# Patient Record
Sex: FEMALE | Race: WHITE | Employment: OTHER | URBAN - METROPOLITAN AREA
[De-identification: names, ages, dates, MRNs, and addresses within clinical notes are randomized per-mention and may not be internally consistent; named-entity substitution may affect disease eponyms.]

---

## 2017-02-15 ENCOUNTER — OFFICE VISIT (OUTPATIENT)
Dept: FAMILY MEDICINE CLINIC | Age: 72
End: 2017-02-15

## 2017-02-15 VITALS
RESPIRATION RATE: 16 BRPM | DIASTOLIC BLOOD PRESSURE: 66 MMHG | HEART RATE: 68 BPM | BODY MASS INDEX: 31.22 KG/M2 | SYSTOLIC BLOOD PRESSURE: 134 MMHG | HEIGHT: 68 IN | OXYGEN SATURATION: 95 % | TEMPERATURE: 96.7 F | WEIGHT: 206 LBS

## 2017-02-15 DIAGNOSIS — E78.2 MIXED HYPERLIPIDEMIA: ICD-10-CM

## 2017-02-15 DIAGNOSIS — E06.3 HASHIMOTO'S THYROIDITIS: Primary | ICD-10-CM

## 2017-02-15 RX ORDER — LEVOTHYROXINE SODIUM 50 UG/1
50 TABLET ORAL
Qty: 90 TAB | Refills: 3 | Status: SHIPPED | OUTPATIENT
Start: 2017-02-15 | End: 2018-01-30 | Stop reason: SDUPTHER

## 2017-02-15 NOTE — PROGRESS NOTES
1. Have you been to the ER, urgent care clinic since your last visit? Hospitalized since your last visit? No    2. Have you seen or consulted any other health care providers outside of the [address] [place] [address] since your last visit? Include any pap smears or colon screening.  No

## 2017-02-15 NOTE — MR AVS SNAPSHOT
Visit Information Date & Time Provider Department Dept. Phone Encounter #  
 2/15/2017  9:40 AM Ha Prado[address]  [id number] Follow-up Instructions Return in about 4 months (around 6/15/2017) for medicare wellness. Jerry Lira Upcoming Health Maintenance Date Due  
 GLAUCOMA SCREENING Q2Y 7/7/2010 INFLUENZA AGE 9 TO ADULT 8/1/2016 MEDICARE YEARLY EXAM 5/5/2017 BREAST CANCER SCRN MAMMOGRAM 11/5/2017 DTaP/Tdap/Td series (2 - Td) 6/23/2025 COLONOSCOPY 4/20/2026 Allergies as of 2/15/2017  Review Complete On: 2/15/2017 By: Ha Prado,  No Known Allergies Current Immunizations  Reviewed on 11/15/2016 Name Date Pneumococcal Conjugate (PCV-13) 11/15/2016 Pneumococcal Polysaccharide (PPSV-23) 10/15/2012 Tdap 6/23/2015 Not reviewed this visit You Were Diagnosed With   
  
 Codes Comments Hashimoto's thyroiditis    -  Primary ICD-10-CM: E06.3 ICD-9-CM: 245.2 Mixed hyperlipidemia     ICD-10-CM: E78.2 ICD-9-CM: 272.2 Vitals BP Pulse Temp Resp Height(growth percentile) Weight(growth percentile) 134/66 (BP 1 Location: Right arm, BP Patient Position: Sitting) 68 96.7 °F (35.9 °C) (Oral) 16 5' 8\" (1.727 m) 206 lb (93.4 kg) SpO2 BMI OB Status Smoking Status 95% 31.32 kg/m2 Postmenopausal Former Smoker BMI and BSA Data Body Mass Index Body Surface Area  
 31.32 kg/m 2 2.12 m 2 Preferred Pharmacy Pharmacy Name Phone [facility] [phone] Your Updated Medication List  
  
   
This list is accurate as of: 2/15/17 11:16 AM.  Always use your most recent med list.  
  
  
  
  
 aspirin delayed-release 81 mg tablet Take  by mouth daily. calcium-cholecalciferol (D3) tablet Commonly known as:  CALTRATE 600+D Take 1 tablet by mouth daily. FISH OIL 1,000 mg Cap Generic drug:  omega-3 fatty acids-vitamin e Take 1 capsule by mouth. ipratropium 17 mcg/actuation inhaler Commonly known as:  ATROVENT HFA Take 1 Puff by inhalation every four (4) hours as needed for Wheezing. levothyroxine 50 mcg tablet Commonly known as:  SYNTHROID Take 1 Tab by mouth Daily (before breakfast). Prescriptions Sent to Pharmacy Refills  
 levothyroxine (SYNTHROID) 50 mcg tablet 3 Sig: Take 1 Tab by mouth Daily (before breakfast). Class: Normal  
 [facility] [place]  #: [phone] Route: Oral  
  
Follow-up Instructions Return in about 4 months (around 6/15/2017) for medicare wellness. .  
  
  
Introducing [facility]! Dear Sonja Altman: Thank you for requesting a Treatspace account. Our records indicate that you have previously registered for a Treatspace account but its currently inactive. Please call our Treatspace support line at [phone]. Additional Information If you have questions, please visit the Frequently Asked Questions section of the Treatspace website at [email or web address] Remember, Treatspace is NOT to be used for urgent needs. For medical emergencies, dial 911. Now available from your iPhone and Android! Please provide this summary of care documentation to your next provider. Your primary care clinician is listed as Mamie Moreland. If you have any questions after today's visit, please call [phone].

## 2017-02-15 NOTE — PROGRESS NOTES
Subjective:     HPI:  Cleveland Bang is a 70 y.o. female who presents to the office for follow up on hypothyroidism and lab results. Hashimoto's Thyroiditis: Patient is taking medication as prescribed. No medication side effects noted. Patient reports her fatigue and dry skin have resolved since starting increased Levothyroxine dose. Labs Reviewed:   Lab Results   Component Value Date/Time    Sodium 139 11/15/2016 11:12 AM    Potassium 4.7 11/15/2016 11:12 AM    Chloride 100 11/15/2016 11:12 AM    CO2 29 11/15/2016 11:12 AM    Anion gap 10 11/15/2016 11:12 AM    Glucose 81 11/15/2016 11:12 AM    BUN 16 11/15/2016 11:12 AM    Creatinine 0.92 11/15/2016 11:12 AM    BUN/Creatinine ratio 17 11/15/2016 11:12 AM    GFR est AA >60 11/15/2016 11:12 AM    GFR est non-AA >60 11/15/2016 11:12 AM    Calcium 8.9 11/15/2016 11:12 AM    Bilirubin, total 0.4 11/15/2016 11:12 AM    AST (SGOT) 19 11/15/2016 11:12 AM    Alk. phosphatase 83 11/15/2016 11:12 AM    Protein, total 7.9 11/15/2016 11:12 AM    Albumin 3.7 11/15/2016 11:12 AM    Globulin 4.2 11/15/2016 11:12 AM    A-G Ratio 0.9 11/15/2016 11:12 AM    ALT (SGPT) 19 11/15/2016 11:12 AM     Lab Results   Component Value Date/Time    Cholesterol, total 223 11/15/2016 11:12 AM    HDL Cholesterol 64 11/15/2016 11:12 AM    LDL, calculated 145 11/15/2016 11:12 AM    VLDL, calculated 14 11/15/2016 11:12 AM    Triglyceride 70 11/15/2016 11:12 AM    CHOL/HDL Ratio 3.5 11/15/2016 11:12 AM     Lab Results   Component Value Date/Time    TSH 3.35 11/15/2016 11:12 AM         Current Outpatient Prescriptions   Medication Sig Dispense Refill    levothyroxine (SYNTHROID) 50 mcg tablet Take 1 Tab by mouth Daily (before breakfast). 90 Tab 3    ipratropium (ATROVENT HFA) 17 mcg/actuation inhaler Take 1 Puff by inhalation every four (4) hours as needed for Wheezing. 1 Inhaler 3    omega-3 fatty acids-vitamin e (FISH OIL) 1,000 mg cap Take 1 capsule by mouth.       calcium-cholecalciferol, D3, (CALTRATE 600+D) tablet Take 1 tablet by mouth daily.  aspirin delayed-release 81 mg tablet Take  by mouth daily. No Known Allergies    Past Medical History   Diagnosis Date    Acute bronchitis 12/28/2015    Advance directive discussed with patient 5/4/2016     Pt would like to appoint her son, Leo Zee as her medical decision maker in the event that she can no longer do so. Phone number is [phone]. Her secondary person is [facility] Phone number is [phone]. If her death is imminent and medical treatment will not help her recover, pt does want life prolonging measures. If her condition makes her unawar    Bilateral leg pain 10/22/2015    Elevated TSH 10/22/2015    Hashimoto's thyroiditis 12/28/2015    Hernia, abdominal     History of benign breast tumor     Nicotine dependence in remission 2/4/2016    Obesity, Class I, BMI 30-34.9 2/4/2016    Prediabetes 10/22/2015    Simple chronic bronchitis ([address] 5/4/2016    Skin lesion 2/4/2016    Varicose vein of leg         Past Surgical History   Procedure Laterality Date    Hx hernia repair       abdominal x2    Hx hernia repair  09/15/2016     ROBOTIC REPAIR OF RECURRENT INCARCERATED HERNIA WITH EXTENSIVE LYSIS OF ADHESIONS WITH PLACEMENT OF MESH    Hx cyst incision and drainage Right      35 y/o    Hx breast biopsy Right      Milk ducts removed       Family History   Problem Relation Age of Onset    Breast Cancer Mother 36   [de-identified] Lung Disease Mother     Elevated Lipids Mother     Stroke Father     Diabetes Paternal Grandfather        Social History     Social History    Marital status: SINGLE     Spouse name: N/A    Number of children: N/A    Years of education: N/A     Occupational History    Not on file.      Social History Main Topics    Smoking status: Former Smoker     Packs/day: 1.00     Years: 30.00     Quit date: 1/1/1992    Smokeless tobacco: Never Used    Alcohol use 1.2 oz/week     2 Glasses of wine per week      Comment: Occasionally     Drug use: No    Sexual activity: Not Currently     Other Topics Concern    Not on file     Social History Narrative       REVIEW OF SYSTEM:  Review of Systems   Constitutional: Negative for chills and fever. Eyes: Negative for blurred vision. Respiratory: Negative for shortness of breath. Cardiovascular: Negative for chest pain, palpitations and leg swelling. Gastrointestinal: Negative for constipation, diarrhea, nausea and vomiting. Musculoskeletal: Negative for joint pain. Neurological: Negative for headaches. Objective:     Visit Vitals    /66 (BP 1 Location: Right arm, BP Patient Position: Sitting)    Pulse 68    Temp 96.7 °F (35.9 °C) (Oral)    Resp 16    Ht 5' 8\" (1.727 m)    Wt 206 lb (93.4 kg)    SpO2 95%    BMI 31.32 kg/m2       PHYSICAL EXAM:  Physical Exam   Constitutional: She is oriented to person, place, and time and well-developed, well-nourished, and in no distress. HENT:   Right Ear: Tympanic membrane, external ear and ear canal normal.   Left Ear: Tympanic membrane, external ear and ear canal normal.   Nose: Nose normal.   Mouth/Throat: Oropharynx is clear and moist.   Eyes: Pupils are equal, round, and reactive to light. Neck: Normal range of motion. Neck supple. No thyromegaly present. Cardiovascular: Normal rate, regular rhythm, normal heart sounds and intact distal pulses. No murmur heard. Pulmonary/Chest: Effort normal and breath sounds normal. She has no wheezes. Neurological: She is alert and oriented to person, place, and time. Skin: Skin is warm and dry. Vitals reviewed. Assessment/Plan:       ICD-10-CM ICD-9-CM    1. Hashimoto's thyroiditis E06.3 245.2 levothyroxine (SYNTHROID) 50 mcg tablet   2.  Mixed hyperlipidemia E78.2 272.2       Patient would like to try to control hypercholesterolemia with lifestyle changes before starting medication. Patient counseled on diet and exercise changes to help reduce cholesterol. Hashimoto's Thyroiditis controlled on current Levothyroxine dose. Medication refilled. Patient given opportunity to ask questions. Questions answered. Patient understands plan of care. Follow-up Disposition:  Return in about 4 months (around 6/15/2017) for medicare wellness.  .        Written by Andreina Bustamante, as dictated by Tatyana Negrete DO.

## 2017-06-12 ENCOUNTER — HOSPITAL ENCOUNTER (OUTPATIENT)
Dept: LAB | Age: 72
Discharge: HOME | End: 2017-06-12

## 2017-06-12 ENCOUNTER — HOSPITAL ENCOUNTER (OUTPATIENT)
Dept: MAMMOGRAPHY | Age: 72
Discharge: HOME | End: 2017-06-12
Payer: MEDICARE

## 2017-06-12 DIAGNOSIS — Z12.31 VISIT FOR SCREENING MAMMOGRAM: ICD-10-CM

## 2017-06-12 PROCEDURE — 99001 SPECIMEN HANDLING PT-LAB: CPT | Performed by: FAMILY MEDICINE

## 2017-06-12 PROCEDURE — 77063 BREAST TOMOSYNTHESIS BI: CPT

## 2017-06-27 ENCOUNTER — OFFICE VISIT (OUTPATIENT)
Dept: FAMILY MEDICINE CLINIC | Age: 72
End: 2017-06-27

## 2017-06-27 VITALS
RESPIRATION RATE: 16 BRPM | WEIGHT: 207 LBS | SYSTOLIC BLOOD PRESSURE: 127 MMHG | TEMPERATURE: 96.7 F | HEART RATE: 68 BPM | OXYGEN SATURATION: 97 % | HEIGHT: 68 IN | DIASTOLIC BLOOD PRESSURE: 76 MMHG | BODY MASS INDEX: 31.37 KG/M2

## 2017-06-27 DIAGNOSIS — Z00.00 MEDICARE ANNUAL WELLNESS VISIT, SUBSEQUENT: Primary | ICD-10-CM

## 2017-06-27 DIAGNOSIS — Z13.5 SCREENING FOR GLAUCOMA: ICD-10-CM

## 2017-06-27 DIAGNOSIS — E78.5 HYPERLIPIDEMIA WITH TARGET LDL LESS THAN 70: ICD-10-CM

## 2017-06-27 NOTE — PROGRESS NOTES
1. Have you been to the ER, urgent care clinic since your last visit? Hospitalized since your last visit? No    2. Have you seen or consulted any other health care providers outside of the [address] [place] [address] since your last visit? Include any pap smears or colon screening.  No

## 2017-06-27 NOTE — MR AVS SNAPSHOT
Visit Information Date & Time Provider Department Dept. Phone Encounter #  
 6/27/2017  3:00 PM Marciano Alexis, [address] [place] [address]  [id number] Follow-up Instructions Return in about 3 months (around 9/27/2017). Upcoming Health Maintenance Date Due  
 GLAUCOMA SCREENING Q2Y 7/7/2010 MEDICARE YEARLY EXAM 5/5/2017 INFLUENZA AGE 9 TO ADULT 8/1/2017 BREAST CANCER SCRN MAMMOGRAM 6/12/2019 DTaP/Tdap/Td series (2 - Td) 6/23/2025 COLONOSCOPY 4/20/2026 Allergies as of 6/27/2017  Review Complete On: 6/27/2017 By: Marciano Alexis DO No Known Allergies Current Immunizations  Reviewed on 6/27/2017 Name Date Pneumococcal Conjugate (PCV-13) 11/15/2016 Pneumococcal Polysaccharide (PPSV-23) 10/15/2012 Tdap 6/23/2015 Reviewed by Marciano Alexis DO on 6/27/2017 at  3:36 PM  
You Were Diagnosed With   
  
 Codes Comments Medicare annual wellness visit, subsequent    -  Primary ICD-10-CM: Z00.00 ICD-9-CM: V70.0 Screening for glaucoma     ICD-10-CM: Z13.5 ICD-9-CM: V80.1 Hyperlipidemia with target LDL less than 70     ICD-10-CM: E78.5 ICD-9-CM: 272.4 Vitals BP Pulse Temp Resp Height(growth percentile) Weight(growth percentile) 127/76 (BP 1 Location: Right arm, BP Patient Position: Sitting) 68 96.7 °F (35.9 °C) (Oral) 16 5' 8\" (1.727 m) 207 lb (93.9 kg) SpO2 BMI OB Status Smoking Status 97% 31.47 kg/m2 Postmenopausal Former Smoker BMI and BSA Data Body Mass Index Body Surface Area  
 31.47 kg/m 2 2.12 m 2 Preferred Pharmacy Pharmacy Name Phone [phone] [phone] Your Updated Medication List  
  
   
This list is accurate as of: 6/27/17  4:06 PM.  Always use your most recent med list.  
  
  
  
  
 aspirin delayed-release 81 mg tablet Take  by mouth daily. calcium-cholecalciferol (D3) tablet Commonly known as:  CALTRATE 600+D Take 1 tablet by mouth daily. FISH OIL 1,000 mg Cap Generic drug:  omega-3 fatty acids-vitamin e Take 1 capsule by mouth. ipratropium 17 mcg/actuation inhaler Commonly known as:  ATROVENT HFA Take 1 Puff by inhalation every four (4) hours as needed for Wheezing. levothyroxine 50 mcg tablet Commonly known as:  SYNTHROID Take 1 Tab by mouth Daily (before breakfast). We Performed the Following REFERRAL TO OPHTHALMOLOGY [REF57 Custom] Comments:  
 Please screen patient for glaucoma. Follow-up Instructions Return in about 3 months (around 9/27/2017). Referral Information Referral ID Referred By Referred To  
  
 [id number] Sangita Patricia SCOTT Not Available Visits Status Start Date End Date 1 New Request 6/27/17 6/27/18 If your referral has a status of pending review or denied, additional information will be sent to support the outcome of this decision. Patient Instructions Medicare Part B Preventive Services Limitations Recommendation Scheduled Bone Mass Measurement 
(age 72 & older, biennial) Requires diagnosis related to osteoporosis or estrogen deficiency. Biennial benefit unless patient has history of long-term glucocorticoid tx or baseline is needed because initial test was by other method Cardiovascular Screening Blood Tests (every 5 years) Total cholesterol, HDL, Triglycerides Order as a panel if possible Colorectal Cancer Screening 
-Fecal occult blood test (annual) -Flexible sigmoidoscopy (5y) 
-Screening colonoscopy (10y) -Barium Enema Counseling to Prevent Tobacco Use (up to 8 sessions per year) - Counseling greater than 3 and up to 10 minutes - Counseling greater than 10 minutes Patients must be asymptomatic of tobacco-related conditions to receive as preventive service Diabetes Screening Tests (at least every 3 years, Medicare covers annually or at 6-month intervals for prediabetic patients) Fasting blood sugar (FBS) or glucose tolerance test (GTT) Patient must be diagnosed with one of the following: 
-Hypertension, Dyslipidemia, obesity, previous impaired FBS or GTT 
Or any two of the following: overweight, FH of diabetes, age ? 72, history of gestational diabetes, birth of baby weighing more than 9 pounds Diabetes Self-Management Training (DSMT) (no USPSTF recommendation) Requires referral by treating physician for patient with diabetes or renal disease. 10 hours of initial DSMT session of no less than 30 minutes each in a continuous 12-month period. 2 hours of follow-up DSMT in subsequent years. Glaucoma Screening (no USPSTF recommendation) Diabetes mellitus, family history, , age 48 or over,  American, age 72 or over Human Immunodeficiency Virus (HIV) Screening (annually for increased risk patients) HIV-1 and HIV-2 by EIA, AYESHA, rapid antibody test, or oral mucosa transudate Patient must be at increased risk for HIV infection per USPSTF guidelines or pregnant. Tests covered annually for patients at increased risk. Pregnant patients may receive up to 3 test during pregnancy. Medical Nutrition Therapy (MNT) (for diabetes or renal disease not recommended schedule) Requires referral by treating physician for patient with diabetes or renal disease. Can be provided in same year as diabetes self-management training (DSMT), and CMS recommends medical nutrition therapy take place after DSMT. Up to 3 hours for initial year and 2 hours in subsequent years. Shingles Vaccination A shingles vaccine is also recommended once in a lifetime after age 61 Seasonal Influenza Vaccination (annually) Pneumococcal Vaccination (once after 65) Hepatitis B Vaccinations (if medium/high risk) Medium/high risk factors:  End-stage renal disease, Hemophiliacs who received Factor VIII or IX concentrates, Clients of institutions for the mentally retarded, Persons who live in the same house as a HepB virus carrier, Homosexual men, Illicit injectable drug abusers. Screening Mammography (biennial age 54-69) Annually (age 36 or over) Screening Pap Tests and Pelvic Examination (up to age 79 and after 79 if unknown history or abnormal study last 10 years) Every 24 months except high risk Ultrasound Screening for Abdominal Aortic Aneurysm (AAA) (once) Patient must be referred through IPPE and not have had a screening for abdominal aortic aneurysm before under Medicare. Limited to patients who meet one of the following criteria: 
- Men who are 73-68 years old and have smoked more than 100 cigarettes in their lifetime. 
-Anyone with a FH of AAA 
-Anyone recommended for screening by USPSTF Introducing [facility]! Dear Cushing: Thank you for requesting a FashionGuide account. Our records indicate that you have previously registered for a FashionGuide account but its currently inactive. Please call our FashionGuide support line at [phone]. Additional Information If you have questions, please visit the Frequently Asked Questions section of the FashionGuide website at [email or web address] Remember, FashionGuide is NOT to be used for urgent needs. For medical emergencies, dial 911. Now available from your iPhone and Android! Please provide this summary of care documentation to your next provider. Your primary care clinician is listed as Giorgio Dsouza. If you have any questions after today's visit, please call [phone].

## 2017-06-27 NOTE — PROGRESS NOTES
This is a Subsequent Medicare Annual Wellness Visit providing Personalized Prevention Plan Services (PPPS) (Performed 12 months after initial AWV and PPPS )    I have reviewed the patient's medical history in detail and updated the computerized patient record. History     Past Medical History:   Diagnosis Date    Acute bronchitis 12/28/2015    Advance directive discussed with patient 5/4/2016    Pt would like to appoint her son, Suman Arreaga as her medical decision maker in the event that she can no longer do so. Phone number is [phone]. Her secondary person is her next door [employer]. Phone number is [phone]. If her death is imminent and medical treatment will not help her recover, pt does want life prolonging measures. If her condition makes her unawar    Bilateral leg pain 10/22/2015    Elevated TSH 10/22/2015    Hashimoto's thyroiditis 12/28/2015    Hernia, abdominal     History of benign breast tumor     Menopause     Nicotine dependence in remission 2/4/2016    Obesity, Class I, BMI 30-34.9 2/4/2016    Prediabetes 10/22/2015    Simple chronic bronchitis ([address] 5/4/2016    Skin lesion 2/4/2016    Varicose vein of leg       Past Surgical History:   Procedure Laterality Date    HX BREAST BIOPSY Right     Milk ducts removed    HX CYST INCISION AND DRAINAGE Right     37 y/o    HX HERNIA REPAIR      abdominal x2    HX HERNIA REPAIR  09/15/2016    ROBOTIC REPAIR OF RECURRENT INCARCERATED HERNIA WITH EXTENSIVE LYSIS OF ADHESIONS WITH PLACEMENT OF MESH     Current Outpatient Prescriptions   Medication Sig Dispense Refill    levothyroxine (SYNTHROID) 50 mcg tablet Take 1 Tab by mouth Daily (before breakfast). 90 Tab 3    ipratropium (ATROVENT HFA) 17 mcg/actuation inhaler Take 1 Puff by inhalation every four (4) hours as needed for Wheezing. 1 Inhaler 3    omega-3 fatty acids-vitamin e (FISH OIL) 1,000 mg cap Take 1 capsule by mouth.       calcium-cholecalciferol, D3, (CALTRATE 600+D) tablet Take 1 tablet by mouth daily.  aspirin delayed-release 81 mg tablet Take  by mouth daily. No Known Allergies  Family History   Problem Relation Age of Onset    Breast Cancer Mother 38     36s    Lung Disease Mother     Elevated Lipids Mother     Stroke Father     Diabetes Paternal Grandfather      Social History   Substance Use Topics    Smoking status: Former Smoker     Packs/day: 1.00     Years: 30.00     Quit date: 1/1/1992    Smokeless tobacco: Never Used    Alcohol use 1.2 oz/week     2 Glasses of wine per week      Comment: Occasionally      Patient Active Problem List   Diagnosis Code    Hyperlipidemia with target LDL less than 70 E78.5    Hashimoto's thyroiditis E06.3    Nicotine dependence in remission F17.201    Obesity, Class I, BMI 30-34.9 E66.9    Advance directive discussed with patient Z71.89       Depression Risk Factor Screening:     PHQ over the last two weeks 2/15/2017   Little interest or pleasure in doing things Not at all   Feeling down, depressed or hopeless Not at all   Total Score PHQ 2 0     Alcohol Risk Factor Screening: On any occasion during the past 3 months, have you had more than 3 drinks containing alcohol? No    Do you average more than 7 drinks per week? No        Functional Ability and Level of Safety:     Hearing Loss   moderate-to-severe    Activities of Daily Living   Self-care. Requires assistance with: no ADLs    Fall Risk   Fall Risk Assessment, last 12 mths 5/4/2016   Able to walk? Yes   Fall in past 12 months? No     Abuse Screen   Patient is not abused    Review of Systems   Review of Systems   Constitutional: Negative for chills and fever. Eyes: Negative for blurred vision. Respiratory: Negative for shortness of breath. Cardiovascular: Negative for chest pain, palpitations and leg swelling. Gastrointestinal: Negative for constipation, diarrhea, nausea and vomiting. Musculoskeletal: Negative for joint pain. Neurological: Negative for headaches. Physical Examination     Evaluation of Cognitive Function:  Mood/affect:  neutral  Appearance: casually dressed and within normal Limits  Family member/caregiver input: none    Physical Exam   Constitutional: She is oriented to person, place, and time and well-developed, well-nourished, and in no distress. HENT:   Right Ear: Tympanic membrane, external ear and ear canal normal.   Left Ear: Tympanic membrane, external ear and ear canal normal.   Nose: Nose normal.   Mouth/Throat: Oropharynx is clear and moist.   Eyes: Pupils are equal, round, and reactive to light. Neck: Normal range of motion. Neck supple. No thyromegaly present. Cardiovascular: Normal rate, regular rhythm, normal heart sounds and intact distal pulses. No murmur heard. Pulmonary/Chest: Effort normal and breath sounds normal. She has no wheezes. Neurological: She is alert and oriented to person, place, and time. GCS score is 15. Skin: Skin is warm and dry. Vitals reviewed. Patient Care Team:  Leo Frye DO as PCP - General (Family Practice)  Keven Davila MD (Gastroenterology)  Chalo Morse MD (Dermatology)  Darcy Naqvi NP (Nurse Practitioner)  Kadeem Goode MD as Consulting Provider (General Surgery)    Advice/Referrals/Counseling   Education and counseling provided (15+ minutes):  Are appropriate based on today's review and evaluation  End of Life Counseling (with patient's permission) - Advanced directives and code status reviewed with patient. Advanced Directives are on file. Pt is full code status.   Pneumococcal Vaccine - PCV-13 given 11/15/2016 and PPSV-23 given 10/15/2012  Tdap - given 6/23/2015  Influenza Vaccine - declined  Screening Mammography - done 6/12/2017, normal  Screening Pap and pelvic (covered once every 2 years) - last pap was 2013, normal  Colorectal cancer screening tests - last colonoscopy was 4/2016, normal findings  Bone mass measurement (DEXA) - done 12/11/2013, osteopenia. Screening for glaucoma - pt referred      Assessment/Plan       ICD-10-CM ICD-9-CM    1. Medicare annual wellness visit, subsequent Z00.00 V70.0    2. Screening for glaucoma Z13.5 V80.1 REFERRAL TO OPHTHALMOLOGY   3. Hyperlipidemia with target LDL less than 70 E78.5 272.4    . Patient given opportunity to ask questions. Questions answered. Patient understands plan of care. Follow-up Disposition:  Return in about 3 months (around 9/27/2017). Written by Gloria Hodge, as dictated by Valery Griffith DO.    I, Dr. Valery Griffith, confirm that all documentation is accurate.

## 2017-06-27 NOTE — PROGRESS NOTES
Subjective:     HPI:  Sudheer Carrasquillo is a 70 y.o. female who presents to the office to follow up on lab results. Labs Reviewed: Total Cholesterol and LDL decreased from lipid panel done 11/15/2016. Pt has made lifestyle changes to help reduce cholesterol. LDL is still elevated at 140. Lab Results   Component Value Date/Time    Sodium 140 06/12/2017 11:07 AM    Potassium 4.2 06/12/2017 11:07 AM    Chloride 100 06/12/2017 11:07 AM    CO2 24 06/12/2017 11:07 AM    Anion gap 10 11/15/2016 11:12 AM    Glucose 80 06/12/2017 11:07 AM    BUN 19 06/12/2017 11:07 AM    Creatinine 0.88 06/12/2017 11:07 AM    BUN/Creatinine ratio 22 06/12/2017 11:07 AM    GFR est AA 76 06/12/2017 11:07 AM    GFR est non-AA 66 06/12/2017 11:07 AM    Calcium 8.8 06/12/2017 11:07 AM    Bilirubin, total 0.4 06/12/2017 11:07 AM    AST (SGOT) 16 06/12/2017 11:07 AM    Alk. phosphatase 75 06/12/2017 11:07 AM    Protein, total 7.7 06/12/2017 11:07 AM    Albumin 3.9 06/12/2017 11:07 AM    Globulin 4.2 11/15/2016 11:12 AM    A-G Ratio 1.0 06/12/2017 11:07 AM    ALT (SGPT) 7 06/12/2017 11:07 AM     Lab Results   Component Value Date/Time    Cholesterol, total 217 06/12/2017 11:07 AM    HDL Cholesterol 56 06/12/2017 11:07 AM    LDL, calculated 140 06/12/2017 11:07 AM    VLDL, calculated 21 06/12/2017 11:07 AM    Triglyceride 104 06/12/2017 11:07 AM    CHOL/HDL Ratio 3.5 11/15/2016 11:12 AM     Lab Results   Component Value Date/Time    TSH 4.710 06/12/2017 11:07 AM       Current Outpatient Prescriptions   Medication Sig Dispense Refill    levothyroxine (SYNTHROID) 50 mcg tablet Take 1 Tab by mouth Daily (before breakfast). 90 Tab 3    ipratropium (ATROVENT HFA) 17 mcg/actuation inhaler Take 1 Puff by inhalation every four (4) hours as needed for Wheezing. 1 Inhaler 3    omega-3 fatty acids-vitamin e (FISH OIL) 1,000 mg cap Take 1 capsule by mouth.  calcium-cholecalciferol, D3, (CALTRATE 600+D) tablet Take 1 tablet by mouth daily.  aspirin delayed-release 81 mg tablet Take  by mouth daily. No Known Allergies    Past Medical History:   Diagnosis Date    Acute bronchitis 12/28/2015    Advance directive discussed with patient 5/4/2016    Pt would like to appoint her son, Nathalia Chowdary as her medical decision maker in the event that she can no longer do so. Phone number is [phone]. Her secondary person is her next door karen [employer]. Phone number is [phone]. If her death is imminent and medical treatment will not help her recover, pt does want life prolonging measures. If her condition makes her unawar    Bilateral leg pain 10/22/2015    Elevated TSH 10/22/2015    Hashimoto's thyroiditis 12/28/2015    Hernia, abdominal     History of benign breast tumor     Menopause     Nicotine dependence in remission 2/4/2016    Obesity, Class I, BMI 30-34.9 2/4/2016    Prediabetes 10/22/2015    Simple chronic bronchitis ([address] 5/4/2016    Skin lesion 2/4/2016    Varicose vein of leg         Past Surgical History:   Procedure Laterality Date    HX BREAST BIOPSY Right     Milk ducts removed    HX CYST INCISION AND DRAINAGE Right     37 y/o    HX HERNIA REPAIR      abdominal x2    HX HERNIA REPAIR  09/15/2016    ROBOTIC REPAIR OF RECURRENT INCARCERATED HERNIA WITH EXTENSIVE LYSIS OF ADHESIONS WITH PLACEMENT OF MESH       Family History   Problem Relation Age of Onset    Breast Cancer Mother 38     36s    Lung Disease Mother     Elevated Lipids Mother     Stroke Father     Diabetes Paternal Grandfather        Social History     Social History    Marital status: SINGLE     Spouse name: N/A    Number of children: N/A    Years of education: N/A     Occupational History    Not on file.      Social History Main Topics    Smoking status: Former Smoker     Packs/day: 1.00     Years: 30.00     Quit date: 1/1/1992    Smokeless tobacco: Never Used    Alcohol use 1.2 oz/week     2 Glasses of wine per week Comment: Occasionally     Drug use: No    Sexual activity: Not Currently     Other Topics Concern    Not on file     Social History Narrative       REVIEW OF SYSTEM:  Review of Systems   Constitutional: Negative for chills and fever. Eyes: Negative for blurred vision. Respiratory: Negative for shortness of breath. Cardiovascular: Negative for chest pain, palpitations and leg swelling. Gastrointestinal: Negative for constipation, diarrhea, nausea and vomiting. Musculoskeletal: Negative for joint pain. Neurological: Negative for headaches. Objective:     Visit Vitals    /76 (BP 1 Location: Right arm, BP Patient Position: Sitting)    Pulse 68    Temp 96.7 °F (35.9 °C) (Oral)    Resp 16    Ht 5' 8\" (1.727 m)    Wt 207 lb (93.9 kg)    SpO2 97%    BMI 31.47 kg/m2       PHYSICAL EXAM:  Physical Exam   Constitutional: She is oriented to person, place, and time and well-developed, well-nourished, and in no distress. HENT:   Right Ear: Tympanic membrane, external ear and ear canal normal.   Left Ear: Tympanic membrane, external ear and ear canal normal.   Nose: Nose normal.   Mouth/Throat: Oropharynx is clear and moist.   Eyes: Pupils are equal, round, and reactive to light. Neck: Normal range of motion. Neck supple. No thyromegaly present. Cardiovascular: Normal rate, regular rhythm, normal heart sounds and intact distal pulses. No murmur heard. Pulmonary/Chest: Effort normal and breath sounds normal. She has no wheezes. Neurological: She is alert and oriented to person, place, and time. GCS score is 15. Skin: Skin is warm and dry. Vitals reviewed. Assessment/Plan:       ICD-10-CM ICD-9-CM    1. Medicare annual wellness visit, subsequent Z00.00 V70.0    2. Screening for glaucoma Z13.5 V80.1 REFERRAL TO OPHTHALMOLOGY   3. Hyperlipidemia with target LDL less than 70 E78.5 272.4      Patient given opportunity to ask questions. Questions answered.   Patient understands plan of care. Follow-up Disposition:  Return in about 3 months (around 9/27/2017). Written by Kervin Langley, as dictated by DO. GRETCHEN Tracey, Dr. Иван Pillai, confirm that all documentation is accurate.

## 2017-06-27 NOTE — PATIENT INSTRUCTIONS
Medicare Part B Preventive Services Limitations Recommendation Scheduled   Bone Mass Measurement  (age 72 & older, biennial) Requires diagnosis related to osteoporosis or estrogen deficiency. Biennial benefit unless patient has history of long-term glucocorticoid tx or baseline is needed because initial test was by other method     Cardiovascular Screening Blood Tests (every 5 years)  Total cholesterol, HDL, Triglycerides Order as a panel if possible     Colorectal Cancer Screening  -Fecal occult blood test (annual)  -Flexible sigmoidoscopy (5y)  -Screening colonoscopy (10y)  -Barium Enema      Counseling to Prevent Tobacco Use (up to 8 sessions per year)  - Counseling greater than 3 and up to 10 minutes  - Counseling greater than 10 minutes Patients must be asymptomatic of tobacco-related conditions to receive as preventive service     Diabetes Screening Tests (at least every 3 years, Medicare covers annually or at 6-month intervals for prediabetic patients)    Fasting blood sugar (FBS) or glucose tolerance test (GTT) Patient must be diagnosed with one of the following:  -Hypertension, Dyslipidemia, obesity, previous impaired FBS or GTT  Or any two of the following: overweight, FH of diabetes, age ? 72, history of gestational diabetes, birth of baby weighing more than 9 pounds     Diabetes Self-Management Training (DSMT) (no USPSTF recommendation) Requires referral by treating physician for patient with diabetes or renal disease. 10 hours of initial DSMT session of no less than 30 minutes each in a continuous 12-month period. 2 hours of follow-up DSMT in subsequent years.      Glaucoma Screening (no USPSTF recommendation) Diabetes mellitus, family history, , age 48 or over,  American, age 72 or over     Human Immunodeficiency Virus (HIV) Screening (annually for increased risk patients)  HIV-1 and HIV-2 by EIA, AYESHA, rapid antibody test, or oral mucosa transudate Patient must be at increased risk for HIV infection per USPSTF guidelines or pregnant. Tests covered annually for patients at increased risk. Pregnant patients may receive up to 3 test during pregnancy. Medical Nutrition Therapy (MNT) (for diabetes or renal disease not recommended schedule) Requires referral by treating physician for patient with diabetes or renal disease. Can be provided in same year as diabetes self-management training (DSMT), and CMS recommends medical nutrition therapy take place after DSMT. Up to 3 hours for initial year and 2 hours in subsequent years. Shingles Vaccination A shingles vaccine is also recommended once in a lifetime after age 61     Seasonal Influenza Vaccination (annually)      Pneumococcal Vaccination (once after 72)      Hepatitis B Vaccinations (if medium/high risk) Medium/high risk factors:  End-stage renal disease,  Hemophiliacs who received Factor VIII or IX concentrates, Clients of institutions for the mentally retarded, Persons who live in the same house as a HepB virus carrier, Homosexual men, Illicit injectable drug abusers. Screening Mammography (biennial age 54-69) Annually (age 36 or over)     Screening Pap Tests and Pelvic Examination (up to age 79 and after 79 if unknown history or abnormal study last 10 years) Every 25 months except high risk     Ultrasound Screening for Abdominal Aortic Aneurysm (AAA) (once) Patient must be referred through [facility] and not have had a screening for abdominal aortic aneurysm before under Medicare.   Limited to patients who meet one of the following criteria:  - Men who are 73-68 years old and have smoked more than 100 cigarettes in their lifetime.  -Anyone with a FH of AAA  -Anyone recommended for screening by USPSTF

## 2017-08-08 DIAGNOSIS — J41.0 SIMPLE CHRONIC BRONCHITIS (HCC): ICD-10-CM

## 2017-08-10 DIAGNOSIS — J41.0 SIMPLE CHRONIC BRONCHITIS (HCC): ICD-10-CM

## 2017-08-11 DIAGNOSIS — J41.0 SIMPLE CHRONIC BRONCHITIS (HCC): ICD-10-CM

## 2017-08-13 RX ORDER — IPRATROPIUM BROMIDE 17 UG/1
AEROSOL, METERED RESPIRATORY (INHALATION)
Qty: 12.9 INHALER | Refills: 3 | OUTPATIENT
Start: 2017-08-13

## 2017-08-13 RX ORDER — IPRATROPIUM BROMIDE 17 UG/1
AEROSOL, METERED RESPIRATORY (INHALATION)
Qty: 12.9 INHALER | Refills: 3 | Status: SHIPPED | OUTPATIENT
Start: 2017-08-13 | End: 2018-07-02 | Stop reason: SDUPTHER

## 2017-09-27 ENCOUNTER — OFFICE VISIT (OUTPATIENT)
Dept: FAMILY MEDICINE CLINIC | Age: 72
End: 2017-09-27

## 2017-09-27 ENCOUNTER — HOSPITAL ENCOUNTER (OUTPATIENT)
Dept: LAB | Age: 72
Discharge: HOME | End: 2017-09-27
Payer: MEDICARE

## 2017-09-27 VITALS
OXYGEN SATURATION: 96 % | HEART RATE: 70 BPM | WEIGHT: 209 LBS | DIASTOLIC BLOOD PRESSURE: 71 MMHG | TEMPERATURE: 97.5 F | HEIGHT: 68 IN | SYSTOLIC BLOOD PRESSURE: 140 MMHG | BODY MASS INDEX: 31.67 KG/M2 | RESPIRATION RATE: 16 BRPM

## 2017-09-27 DIAGNOSIS — E06.3 HASHIMOTO'S THYROIDITIS: Primary | ICD-10-CM

## 2017-09-27 DIAGNOSIS — Z23 ENCOUNTER FOR IMMUNIZATION: ICD-10-CM

## 2017-09-27 LAB — TSH SERPL DL<=0.05 MIU/L-ACNC: 3.96 UIU/ML (ref 0.36–3.74)

## 2017-09-27 PROCEDURE — 36415 COLL VENOUS BLD VENIPUNCTURE: CPT | Performed by: FAMILY MEDICINE

## 2017-09-27 PROCEDURE — 84443 ASSAY THYROID STIM HORMONE: CPT | Performed by: FAMILY MEDICINE

## 2017-09-27 NOTE — MR AVS SNAPSHOT
Visit Information Date & Time Provider Department Dept. Phone Encounter #  
 9/27/2017  9:40 AM Taylor Stanley[address]  [id number] Follow-up Instructions Return in about 4 months (around 1/27/2018), or sooner if symptoms worsen or fail to improve. Upcoming Health Maintenance Date Due  
 GLAUCOMA SCREENING Q2Y 7/7/2010 INFLUENZA AGE 9 TO ADULT 8/1/2017 MEDICARE YEARLY EXAM 6/28/2018 BREAST CANCER SCRN MAMMOGRAM 6/12/2019 DTaP/Tdap/Td series (2 - Td) 6/23/2025 COLONOSCOPY 4/20/2026 Allergies as of 9/27/2017  Review Complete On: 9/27/2017 By: Taylor Stanley, DO No Known Allergies Current Immunizations  Reviewed on 6/27/2017 Name Date Influenza High Dose Vaccine PF  Incomplete Pneumococcal Conjugate (PCV-13) 11/15/2016 Pneumococcal Polysaccharide (PPSV-23) 10/15/2012 Tdap 6/23/2015 Not reviewed this visit You Were Diagnosed With   
  
 Codes Comments Hashimoto's thyroiditis    -  Primary ICD-10-CM: E06.3 ICD-9-CM: 245.2 Encounter for immunization     ICD-10-CM: H79 ICD-9-CM: V03.89 Vitals BP Pulse Temp Resp Height(growth percentile) Weight(growth percentile) 140/71 (BP 1 Location: Right arm, BP Patient Position: Sitting) 70 97.5 °F (36.4 °C) (Oral) 16 5' 8\" (1.727 m) 209 lb (94.8 kg) SpO2 BMI OB Status Smoking Status 96% 31.78 kg/m2 Postmenopausal Former Smoker BMI and BSA Data Body Mass Index Body Surface Area 31.78 kg/m 2 2.13 m 2 Preferred Pharmacy Pharmacy Name Phone [phone] [facility] [phone] Your Updated Medication List  
  
   
This list is accurate as of: 9/27/17 10:31 AM.  Always use your most recent med list.  
  
  
  
  
 aspirin delayed-release 81 mg tablet Take  by mouth daily. * ipratropium 17 mcg/actuation inhaler Commonly known as:  ATROVENT HFA  
 Take 1 Puff by inhalation every four (4) hours as needed for Wheezing. * ATROVENT HFA 17 mcg/actuation inhaler Generic drug:  ipratropium TAKE 1 PUFF BY INHALATION EVERY 4 HOURS AS NEEDED FOR WHEEZING  
  
 calcium-cholecalciferol (D3) tablet Commonly known as:  CALTRATE 600+D Take 1 tablet by mouth daily. FISH OIL 1,000 mg Cap Generic drug:  omega-3 fatty acids-vitamin e Take 1 capsule by mouth.  
  
 levothyroxine 50 mcg tablet Commonly known as:  SYNTHROID Take 1 Tab by mouth Daily (before breakfast). * Notice: This list has 2 medication(s) that are the same as other medications prescribed for you. Read the directions carefully, and ask your doctor or other care provider to review them with you. We Performed the Following ADMIN INFLUENZA VIRUS VAC [ \[facility]\""] INFLUENZA VIRUS VACCINE, HIGH DOSE SEASONAL, PRESERVATIVE FREE [15607 CPT(R)] Follow-up Instructions Return in about 4 months (around 1/27/2018), or sooner if symptoms worsen or fail to improve. To-Do List   
 09/27/2017 Lab:  TSH 3RD GENERATION [facility]! Dear Bienvenido Lebron: Thank you for requesting a [employer] account. Our records indicate that you have previously registered for a [employer] account but its currently inactive. Please call our [employer] support line at [phone]. Additional Information If you have questions, please visit the Frequently Asked Questions section of the [employer] website at [email or web address] Remember, [employer] is NOT to be used for urgent needs. For medical emergencies, dial 911. Now available from your iPhone and Android! Please provide this summary of care documentation to your next provider. Your primary care clinician is listed as Saritha Layton. If you have any questions after today's visit, please call [phone].

## 2017-09-27 NOTE — PROGRESS NOTES
1. Have you been to the ER, urgent care clinic since your last visit? Hospitalized since your last visit? No    2. Have you seen or consulted any other health care providers outside of the [address] [place] [address] since your last visit? Include any pap smears or colon screening.  No

## 2017-09-27 NOTE — PROGRESS NOTES
Subjective:     HPI:  Gurpreet Mata is a 67 y.o. female who presents to the office to follow up on Hashimoto's thyroiditis. Hashimoto's thyroiditis: Patient is taking Synthroid 50 mcg as instructed. No medication side effects noted. Elevated blood pressure    Pt's BP is 140/71 in the office today. Patient is currently not taking any medication for hypertension. Cardiovascular ROS: No TIA's, no chest pain on exertion, no dyspnea on exertion, no swelling of ankles. Of note,   Influenza vaccine given today. Current Outpatient Prescriptions   Medication Sig Dispense Refill    ATROVENT HFA 17 mcg/actuation inhaler  TAKE 1 PUFF BY INHALATION EVERY 4 HOURS AS NEEDED FOR WHEEZING 12.9 Inhaler 3    ipratropium (ATROVENT HFA) 17 mcg/actuation inhaler Take 1 Puff by inhalation every four (4) hours as needed for Wheezing. 1 Inhaler 3    levothyroxine (SYNTHROID) 50 mcg tablet Take 1 Tab by mouth Daily (before breakfast). 90 Tab 3    omega-3 fatty acids-vitamin e (FISH OIL) 1,000 mg cap Take 1 capsule by mouth.  calcium-cholecalciferol, D3, (CALTRATE 600+D) tablet Take 1 tablet by mouth daily.  aspirin delayed-release 81 mg tablet Take  by mouth daily. No Known Allergies    Past Medical History:   Diagnosis Date    Acute bronchitis 12/28/2015    Advance directive discussed with patient 5/4/2016    Pt would like to appoint her son, Jasvir Stinson as her medical decision maker in the event that she can no longer do so. Phone number is [phone]. Her secondary person is her [facility] door [facility] [employer]. Phone number is [phone]. If her death is imminent and medical treatment will not help her recover, pt does want life prolonging measures.   If her condition makes her unawar    Bilateral leg pain 10/22/2015    Elevated TSH 10/22/2015    Hashimoto's thyroiditis 12/28/2015    Hernia, abdominal     History of benign breast tumor     Menopause     Nicotine dependence in remission 2/4/2016    Obesity, Class I, BMI 30-34.9 2/4/2016    Prediabetes 10/22/2015    Simple chronic bronchitis ([address] 5/4/2016    Skin lesion 2/4/2016    Varicose vein of leg         Past Surgical History:   Procedure Laterality Date    HX BREAST BIOPSY Right     Milk ducts removed    HX CYST INCISION AND DRAINAGE Right     37 y/o    HX HERNIA REPAIR      abdominal x2    HX HERNIA REPAIR  09/15/2016    ROBOTIC REPAIR OF RECURRENT INCARCERATED HERNIA WITH EXTENSIVE LYSIS OF ADHESIONS WITH PLACEMENT OF MESH       Family History   Problem Relation Age of Onset    Breast Cancer Mother 38     36s    Lung Disease Mother     Elevated Lipids Mother     Stroke Father     Diabetes Paternal Grandfather        Social History     Social History    Marital status: SINGLE     Spouse name: N/A    Number of children: N/A    Years of education: N/A     Occupational History    Not on file. Social History Main Topics    Smoking status: Former Smoker     Packs/day: 1.00     Years: 30.00     Quit date: 1/1/1992    Smokeless tobacco: Never Used    Alcohol use 1.2 oz/week     2 Glasses of wine per week      Comment: Occasionally     Drug use: No    Sexual activity: Not Currently     Other Topics Concern    Not on file     Social History Narrative       REVIEW OF SYSTEM:  Review of Systems   Constitutional: Negative for chills and fever. Eyes: Negative for blurred vision. Respiratory: Negative for shortness of breath. Cardiovascular: Negative for chest pain, palpitations and leg swelling. Gastrointestinal: Negative for constipation, diarrhea, nausea and vomiting. Musculoskeletal: Negative for joint pain. Neurological: Negative for headaches.        Objective:     Visit Vitals    /71 (BP 1 Location: Right arm, BP Patient Position: Sitting)    Pulse 70    Temp 97.5 °F (36.4 °C) (Oral)    Resp 16    Ht 5' 8\" (1.727 m)    Wt 209 lb (94.8 kg)    SpO2 96%    BMI 31.78 kg/m2 PHYSICAL EXAM:  Physical Exam   Constitutional: She is oriented to person, place, and time and well-developed, well-nourished, and in no distress. HENT:   Right Ear: Tympanic membrane, external ear and ear canal normal.   Left Ear: Tympanic membrane, external ear and ear canal normal.   Nose: Nose normal.   Mouth/Throat: Oropharynx is clear and moist.   Eyes: Pupils are equal, round, and reactive to light. Neck: Normal range of motion. Neck supple. No thyromegaly present. Cardiovascular: Normal rate, regular rhythm, normal heart sounds and intact distal pulses. No murmur heard. Pulmonary/Chest: Effort normal and breath sounds normal. She has no wheezes. Neurological: She is alert and oriented to person, place, and time. GCS score is 15. Skin: Skin is warm and dry. Vitals reviewed. Assessment/Plan:       ICD-10-CM ICD-9-CM    1. Hashimoto's thyroiditis E06.3 245.2 TSH 3RD GENERATION      TSH 3RD GENERATION   2. Encounter for immunization Z23 V03.89 INFLUENZA VIRUS VACCINE, HIGH DOSE SEASONAL, PRESERVATIVE FREE      ADMIN INFLUENZA VIRUS VAC     Patient given opportunity to ask questions. Questions answered. BP in office today is 140/71. Patient advised to make diet changes and impertinent exercise to their schedule. Patient understands plan of care. Follow-up Disposition:  Return in about 4 months (around 1/27/2018), or sooner if symptoms worsen or fail to improve. Written by Demetrius Alexis, as dictated by DO. GRETCHEN Suarez, Dr. Segundo Kramer, confirm that all documentation is accurate.

## 2018-01-30 ENCOUNTER — OFFICE VISIT (OUTPATIENT)
Dept: FAMILY MEDICINE CLINIC | Age: 73
End: 2018-01-30

## 2018-01-30 VITALS
HEIGHT: 68 IN | BODY MASS INDEX: 32.61 KG/M2 | TEMPERATURE: 97.2 F | WEIGHT: 215.2 LBS | RESPIRATION RATE: 18 BRPM | HEART RATE: 71 BPM | SYSTOLIC BLOOD PRESSURE: 144 MMHG | OXYGEN SATURATION: 97 % | DIASTOLIC BLOOD PRESSURE: 76 MMHG

## 2018-01-30 DIAGNOSIS — E06.3 HASHIMOTO'S THYROIDITIS: Primary | ICD-10-CM

## 2018-01-30 RX ORDER — LEVOTHYROXINE SODIUM 50 UG/1
50 TABLET ORAL
Qty: 90 TAB | Refills: 3 | Status: SHIPPED | OUTPATIENT
Start: 2018-01-30 | End: 2019-01-02 | Stop reason: SDUPTHER

## 2018-01-30 NOTE — PROGRESS NOTES
1. Have you been to the ER, urgent care clinic since your last visit? Hospitalized since your last visit? No    2. Have you seen or consulted any other health care providers outside of the [address] [place] [address] since your last visit? Include any pap smears or colon screening.  No

## 2018-01-30 NOTE — PROGRESS NOTES
Subjective:     HPI:  Jovanny Malcolm is a 67 y.o. female who presents to the office to follow-up on Hashimoto's Thyroiditis. Hashimoto's thyroiditis: Patient is taking Synthroid 50 mcg as instructed. No medication side effects noted. Lab Results   Component Value Date/Time    TSH 3.96 09/27/2017 10:42 AM     Of note,   Pt followed-up with Opthalmology at [facility] in July, 2017 and had glaucoma screening completed. Pt states that her eye pressure was 3.9. Current Outpatient Prescriptions   Medication Sig Dispense Refill    levothyroxine (SYNTHROID) 50 mcg tablet Take 1 Tab by mouth Daily (before breakfast). 90 Tab 3    ATROVENT HFA 17 mcg/actuation inhaler  TAKE 1 PUFF BY INHALATION EVERY 4 HOURS AS NEEDED FOR WHEEZING 12.9 Inhaler 3    ipratropium (ATROVENT HFA) 17 mcg/actuation inhaler Take 1 Puff by inhalation every four (4) hours as needed for Wheezing. 1 Inhaler 3    omega-3 fatty acids-vitamin e (FISH OIL) 1,000 mg cap Take 1 capsule by mouth.  calcium-cholecalciferol, D3, (CALTRATE 600+D) tablet Take 1 tablet by mouth daily.  aspirin delayed-release 81 mg tablet Take  by mouth daily. No Known Allergies    Past Medical History:   Diagnosis Date    Acute bronchitis 12/28/2015    Advance directive discussed with patient 5/4/2016    Pt would like to appoint her son, Margarita Chapman as her medical decision maker in the event that she can no longer do so. Phone number is [phone]. Her secondary person is her next door [facility] [employer]. Phone number is [phone]. If her death is imminent and medical treatment will not help her recover, pt does want life prolonging measures.   If her condition makes her unawar    Bilateral leg pain 10/22/2015    Elevated TSH 10/22/2015    Hashimoto's thyroiditis 12/28/2015    Hernia, abdominal     History of benign breast tumor     Menopause     Nicotine dependence in remission 2/4/2016    Obesity, Class I, BMI 30-34.9 2/4/2016    Prediabetes 10/22/2015    Simple chronic bronchitis (HCC) 5/4/2016    Skin lesion 2/4/2016    Varicose vein of leg         Past Surgical History:   Procedure Laterality Date    HX BREAST BIOPSY Right     Milk ducts removed    HX CYST INCISION AND DRAINAGE Right     37 y/o    HX HERNIA REPAIR      abdominal x2    HX HERNIA REPAIR  09/15/2016    ROBOTIC REPAIR OF RECURRENT INCARCERATED HERNIA WITH EXTENSIVE LYSIS OF ADHESIONS WITH PLACEMENT OF MESH       Family History   Problem Relation Age of Onset    Breast Cancer Mother 38     36s    Lung Disease Mother     Elevated Lipids Mother     Stroke Father     Diabetes Paternal Grandfather        Social History     Social History    Marital status: SINGLE     Spouse name: N/A    Number of children: N/A    Years of education: N/A     Occupational History    Not on file. Social History Main Topics    Smoking status: Former Smoker     Packs/day: 1.00     Years: 30.00     Quit date: 1/1/1992    Smokeless tobacco: Never Used    Alcohol use 1.2 oz/week     2 Glasses of wine per week      Comment: Occasionally     Drug use: No    Sexual activity: Not Currently     Other Topics Concern    Not on file     Social History Narrative       REVIEW OF SYSTEM:  Review of Systems   Constitutional: Negative for chills and fever. Eyes: Negative for blurred vision. Respiratory: Negative for shortness of breath. Cardiovascular: Negative for chest pain, palpitations and leg swelling. Gastrointestinal: Negative for constipation, diarrhea, nausea and vomiting. Musculoskeletal: Negative for joint pain. Neurological: Negative for headaches.        Objective:     Visit Vitals    /76 (BP 1 Location: Right arm, BP Patient Position: Sitting)    Pulse 71    Temp 97.2 °F (36.2 °C) (Oral)    Resp 18    Ht 5' 8\" (1.727 m)    Wt 215 lb 3.2 oz (97.6 kg)    SpO2 97%    BMI 32.72 kg/m2       PHYSICAL EXAM:  Physical Exam Constitutional: She is oriented to person, place, and time and well-developed, well-nourished, and in no distress. HENT:   Right Ear: Tympanic membrane, external ear and ear canal normal.   Left Ear: Tympanic membrane, external ear and ear canal normal.   Nose: Nose normal.   Mouth/Throat: Oropharynx is clear and moist.   Eyes: Pupils are equal, round, and reactive to light. Neck: Normal range of motion. Neck supple. No thyromegaly present. Cardiovascular: Normal rate, regular rhythm, normal heart sounds and intact distal pulses. No murmur heard. Pulmonary/Chest: Effort normal and breath sounds normal. She has no wheezes. Neurological: She is alert and oriented to person, place, and time. GCS score is 15. Skin: Skin is warm and dry. Vitals reviewed. Assessment/Plan:       ICD-10-CM ICD-9-CM    1. Hashimoto's thyroiditis E06.3 245.2 levothyroxine (SYNTHROID) 50 mcg tablet      TSH 3RD GENERATION      TSH 3RD GENERATION     Patient given opportunity to ask questions. Questions answered. Patient understands plan of care. Follow-up Disposition:  Return in about 5 months (around 6/30/2018). Written by Yonathan Palumbo, as dictated by DO. GRETCHEN Solis, Dr. Rosario Jimenez, confirm that all documentation is accurate.

## 2018-01-30 NOTE — MR AVS SNAPSHOT
Rodney Hernandez 
 
 
 [id number] [facility] [address] [id number] 
[phone] Patient: Yelena Page MRN: [id number] ICP:[id number] Visit Information Date & Time Provider Department Dept. Phone Encounter #  
 1/30/2018 10:20 AM Reggie Lewis[address]  [id number] Follow-up Instructions Return in about 5 months (around 6/30/2018). Upcoming Health Maintenance Date Due  
 GLAUCOMA SCREENING Q2Y 7/7/2010 MEDICARE YEARLY EXAM 6/28/2018 BREAST CANCER SCRN MAMMOGRAM 6/12/2019 DTaP/Tdap/Td series (2 - Td) 6/23/2025 COLONOSCOPY 4/20/2026 Allergies as of 1/30/2018  Review Complete On: 1/30/2018 By: Reggie Lewis, DO No Known Allergies Current Immunizations  Reviewed on 6/27/2017 Name Date Influenza High Dose Vaccine PF 9/27/2017 Pneumococcal Conjugate (PCV-13) 11/15/2016 Pneumococcal Polysaccharide (PPSV-23) 10/15/2012 Tdap 6/23/2015 Not reviewed this visit You Were Diagnosed With   
  
 Codes Comments Hashimoto's thyroiditis    -  Primary ICD-10-CM: E06.3 ICD-9-CM: 784. 2 Vitals BP Pulse Temp Resp Height(growth percentile) Weight(growth percentile) 144/76 (BP 1 Location: Right arm, BP Patient Position: Sitting) 71 97.2 °F (36.2 °C) (Oral) 18 5' 8\" (1.727 m) 215 lb 3.2 oz (97.6 kg) SpO2 BMI OB Status Smoking Status 97% 32.72 kg/m2 Postmenopausal Former Smoker BMI and BSA Data Body Mass Index Body Surface Area 32.72 kg/m 2 2.16 m 2 Preferred Pharmacy Pharmacy Name Phone [id number] [facility] [phone] Your Updated Medication List  
  
   
This list is accurate as of: 1/30/18 11:42 AM.  Always use your most recent med list.  
  
  
  
  
 aspirin delayed-release 81 mg tablet Take  by mouth daily. * ipratropium 17 mcg/actuation inhaler Commonly known as:  ATROVENT HFA Take 1 Puff by inhalation every four (4) hours as needed for Wheezing. * ATROVENT HFA 17 mcg/actuation inhaler Generic drug:  ipratropium TAKE 1 PUFF BY INHALATION EVERY 4 HOURS AS NEEDED FOR WHEEZING  
  
 calcium-cholecalciferol (D3) tablet Commonly known as:  CALTRATE 600+D Take 1 tablet by mouth daily. FISH OIL 1,000 mg Cap Generic drug:  omega-3 fatty acids-vitamin e Take 1 capsule by mouth.  
  
 levothyroxine 50 mcg tablet Commonly known as:  SYNTHROID Take 1 Tab by mouth Daily (before breakfast). * Notice: This list has 2 medication(s) that are the same as other medications prescribed for you. Read the directions carefully, and ask your doctor or other care provider to review them with you. Prescriptions Sent to Pharmacy Refills  
 levothyroxine (SYNTHROID) 50 mcg tablet 3 Sig: Take 1 Tab by mouth Daily (before breakfast). Class: Normal  
 [facility] [phone] Route: Oral  
  
Follow-up Instructions Return in about 5 months (around 6/30/2018). To-Do List   
 01/30/2018 Lab:  TSH 3RD GENERATION [facility]! Dear Mirella Stewart: Thank you for requesting a Enubila account. Our records indicate that you already have an active Enubila account. You can access your account anytime at [email or web address] Did you know that you can access your hospital and ER discharge instructions at any time in Enubila? You can also review all of your test results from your hospital stay or ER visit. Additional Information If you have questions, please visit the Frequently Asked Questions section of the Enubila website at [email or web address] Remember, Enubila is NOT to be used for urgent needs. For medical emergencies, dial 911. Now available from your iPhone and Android! Please provide this summary of care documentation to your next provider. Your primary care clinician is listed as Silvia Asher. If you have any questions after today's visit, please call [phone].

## 2018-01-31 LAB — TSH SERPL DL<=0.005 MIU/L-ACNC: 4.9 UIU/ML (ref 0.45–4.5)

## 2018-03-07 ENCOUNTER — APPOINTMENT (OUTPATIENT)
Dept: GENERAL RADIOLOGY | Age: 73
DRG: 191 | End: 2018-03-07
Attending: PHYSICIAN ASSISTANT
Payer: MEDICARE

## 2018-03-07 ENCOUNTER — APPOINTMENT (OUTPATIENT)
Dept: CT IMAGING | Age: 73
DRG: 191 | End: 2018-03-07
Attending: EMERGENCY MEDICINE
Payer: MEDICARE

## 2018-03-07 ENCOUNTER — HOSPITAL ENCOUNTER (INPATIENT)
Age: 73
LOS: 2 days | Discharge: HOME HEALTH | DRG: 191 | End: 2018-03-09
Attending: EMERGENCY MEDICINE | Admitting: HOSPITALIST
Payer: MEDICARE

## 2018-03-07 ENCOUNTER — OFFICE VISIT (OUTPATIENT)
Dept: INTERNAL MEDICINE CLINIC | Age: 73
End: 2018-03-07

## 2018-03-07 VITALS
RESPIRATION RATE: 22 BRPM | TEMPERATURE: 97.3 F | DIASTOLIC BLOOD PRESSURE: 75 MMHG | HEIGHT: 68 IN | HEART RATE: 83 BPM | WEIGHT: 218 LBS | BODY MASS INDEX: 33.04 KG/M2 | OXYGEN SATURATION: 87 % | SYSTOLIC BLOOD PRESSURE: 148 MMHG

## 2018-03-07 DIAGNOSIS — R06.02 SHORTNESS OF BREATH: ICD-10-CM

## 2018-03-07 DIAGNOSIS — J98.01 BRONCHOSPASM: ICD-10-CM

## 2018-03-07 DIAGNOSIS — R09.02 HYPOXEMIA: ICD-10-CM

## 2018-03-07 DIAGNOSIS — R68.89 FLU-LIKE SYMPTOMS: Primary | ICD-10-CM

## 2018-03-07 DIAGNOSIS — J11.1 INFLUENZA: ICD-10-CM

## 2018-03-07 DIAGNOSIS — R05.8 PRODUCTIVE COUGH: ICD-10-CM

## 2018-03-07 PROBLEM — J44.1 COPD WITH EXACERBATION (HCC): Status: ACTIVE | Noted: 2018-03-07

## 2018-03-07 LAB
ALBUMIN SERPL-MCNC: 3.9 G/DL (ref 3.4–5)
ALBUMIN/GLOB SERPL: 0.8 {RATIO} (ref 0.8–1.7)
ALP SERPL-CCNC: 98 U/L (ref 45–117)
ALT SERPL-CCNC: 17 U/L (ref 13–56)
ANION GAP SERPL CALC-SCNC: 6 MMOL/L (ref 3–18)
APPEARANCE UR: CLEAR
AST SERPL-CCNC: 18 U/L (ref 15–37)
ATRIAL RATE: 80 BPM
BASOPHILS # BLD: 0 K/UL (ref 0–0.06)
BASOPHILS NFR BLD: 0 % (ref 0–2)
BILIRUB SERPL-MCNC: 0.3 MG/DL (ref 0.2–1)
BILIRUB UR QL: NEGATIVE
BNP SERPL-MCNC: 492 PG/ML (ref 0–900)
BUN SERPL-MCNC: 16 MG/DL (ref 7–18)
BUN/CREAT SERPL: 18 (ref 12–20)
CALCIUM SERPL-MCNC: 8.4 MG/DL (ref 8.5–10.1)
CALCULATED P AXIS, ECG09: 34 DEGREES
CALCULATED R AXIS, ECG10: 86 DEGREES
CALCULATED T AXIS, ECG11: 30 DEGREES
CHLORIDE SERPL-SCNC: 103 MMOL/L (ref 100–108)
CK MB CFR SERPL CALC: 1.4 % (ref 0–4)
CK MB SERPL-MCNC: 1 NG/ML (ref 5–25)
CK SERPL-CCNC: 74 U/L (ref 26–192)
CO2 SERPL-SCNC: 31 MMOL/L (ref 21–32)
COLOR UR: YELLOW
CREAT SERPL-MCNC: 0.9 MG/DL (ref 0.6–1.3)
DIAGNOSIS, 93000: NORMAL
DIFFERENTIAL METHOD BLD: ABNORMAL
EOSINOPHIL # BLD: 0 K/UL (ref 0–0.4)
EOSINOPHIL NFR BLD: 0 % (ref 0–5)
ERYTHROCYTE [DISTWIDTH] IN BLOOD BY AUTOMATED COUNT: 13.1 % (ref 11.6–14.5)
GLOBULIN SER CALC-MCNC: 4.6 G/DL (ref 2–4)
GLUCOSE SERPL-MCNC: 130 MG/DL (ref 74–99)
GLUCOSE UR STRIP.AUTO-MCNC: NEGATIVE MG/DL
HCT VFR BLD AUTO: 42.6 % (ref 35–45)
HGB BLD-MCNC: 14.1 G/DL (ref 12–16)
HGB UR QL STRIP: NEGATIVE
KETONES UR QL STRIP.AUTO: ABNORMAL MG/DL
LACTATE BLD-SCNC: 2.6 MMOL/L (ref 0.4–2)
LACTATE BLD-SCNC: 3 MMOL/L (ref 0.4–2)
LACTATE SERPL-SCNC: 3.4 MMOL/L (ref 0.4–2)
LEUKOCYTE ESTERASE UR QL STRIP.AUTO: NEGATIVE
LYMPHOCYTES # BLD: 0.4 K/UL (ref 0.9–3.6)
LYMPHOCYTES NFR BLD: 6 % (ref 21–52)
MCH RBC QN AUTO: 31.7 PG (ref 24–34)
MCHC RBC AUTO-ENTMCNC: 33.1 G/DL (ref 31–37)
MCV RBC AUTO: 95.7 FL (ref 74–97)
MONOCYTES # BLD: 0.1 K/UL (ref 0.05–1.2)
MONOCYTES NFR BLD: 2 % (ref 3–10)
NEUTS SEG # BLD: 6.2 K/UL (ref 1.8–8)
NEUTS SEG NFR BLD: 92 % (ref 40–73)
NITRITE UR QL STRIP.AUTO: NEGATIVE
P-R INTERVAL, ECG05: 162 MS
PH UR STRIP: 5 [PH] (ref 5–8)
PLATELET # BLD AUTO: 184 K/UL (ref 135–420)
PMV BLD AUTO: 10.2 FL (ref 9.2–11.8)
POTASSIUM SERPL-SCNC: 3.9 MMOL/L (ref 3.5–5.5)
PROT SERPL-MCNC: 8.5 G/DL (ref 6.4–8.2)
PROT UR STRIP-MCNC: NEGATIVE MG/DL
Q-T INTERVAL, ECG07: 388 MS
QRS DURATION, ECG06: 90 MS
QTC CALCULATION (BEZET), ECG08: 447 MS
QUICKVUE INFLUENZA TEST: POSITIVE
RBC # BLD AUTO: 4.45 M/UL (ref 4.2–5.3)
SODIUM SERPL-SCNC: 140 MMOL/L (ref 136–145)
SP GR UR REFRACTOMETRY: >1.03 (ref 1–1.03)
TROPONIN I SERPL-MCNC: <0.02 NG/ML (ref 0–0.04)
UROBILINOGEN UR QL STRIP.AUTO: 0.2 EU/DL (ref 0.2–1)
VALID INTERNAL CONTROL?: YES
VENTRICULAR RATE, ECG03: 80 BPM
WBC # BLD AUTO: 6.7 K/UL (ref 4.6–13.2)

## 2018-03-07 PROCEDURE — 74011000250 HC RX REV CODE- 250: Performed by: EMERGENCY MEDICINE

## 2018-03-07 PROCEDURE — 71275 CT ANGIOGRAPHY CHEST: CPT

## 2018-03-07 PROCEDURE — 74011250636 HC RX REV CODE- 250/636: Performed by: NURSE PRACTITIONER

## 2018-03-07 PROCEDURE — 93005 ELECTROCARDIOGRAM TRACING: CPT

## 2018-03-07 PROCEDURE — 96360 HYDRATION IV INFUSION INIT: CPT

## 2018-03-07 PROCEDURE — 74011250637 HC RX REV CODE- 250/637: Performed by: NURSE PRACTITIONER

## 2018-03-07 PROCEDURE — 74011000250 HC RX REV CODE- 250: Performed by: PHYSICIAN ASSISTANT

## 2018-03-07 PROCEDURE — 83880 ASSAY OF NATRIURETIC PEPTIDE: CPT | Performed by: EMERGENCY MEDICINE

## 2018-03-07 PROCEDURE — 96375 TX/PRO/DX INJ NEW DRUG ADDON: CPT

## 2018-03-07 PROCEDURE — 74011636320 HC RX REV CODE- 636/320: Performed by: EMERGENCY MEDICINE

## 2018-03-07 PROCEDURE — 94760 N-INVAS EAR/PLS OXIMETRY 1: CPT

## 2018-03-07 PROCEDURE — 85025 COMPLETE CBC W/AUTO DIFF WBC: CPT | Performed by: PHYSICIAN ASSISTANT

## 2018-03-07 PROCEDURE — 82550 ASSAY OF CK (CPK): CPT | Performed by: EMERGENCY MEDICINE

## 2018-03-07 PROCEDURE — 87040 BLOOD CULTURE FOR BACTERIA: CPT | Performed by: PHYSICIAN ASSISTANT

## 2018-03-07 PROCEDURE — 94761 N-INVAS EAR/PLS OXIMETRY MLT: CPT

## 2018-03-07 PROCEDURE — 74011000250 HC RX REV CODE- 250: Performed by: NURSE PRACTITIONER

## 2018-03-07 PROCEDURE — 99284 EMERGENCY DEPT VISIT MOD MDM: CPT

## 2018-03-07 PROCEDURE — 74011000258 HC RX REV CODE- 258: Performed by: EMERGENCY MEDICINE

## 2018-03-07 PROCEDURE — 96361 HYDRATE IV INFUSION ADD-ON: CPT

## 2018-03-07 PROCEDURE — 83605 ASSAY OF LACTIC ACID: CPT

## 2018-03-07 PROCEDURE — 77030032490 HC SLV COMPR SCD KNE COVD -B

## 2018-03-07 PROCEDURE — 74011250636 HC RX REV CODE- 250/636: Performed by: EMERGENCY MEDICINE

## 2018-03-07 PROCEDURE — 83605 ASSAY OF LACTIC ACID: CPT | Performed by: PHYSICIAN ASSISTANT

## 2018-03-07 PROCEDURE — 94640 AIRWAY INHALATION TREATMENT: CPT

## 2018-03-07 PROCEDURE — 96365 THER/PROPH/DIAG IV INF INIT: CPT

## 2018-03-07 PROCEDURE — 77030020263 HC SOL INJ SOD CL0.9% LFCR 1000ML

## 2018-03-07 PROCEDURE — 71045 X-RAY EXAM CHEST 1 VIEW: CPT

## 2018-03-07 PROCEDURE — 65660000000 HC RM CCU STEPDOWN

## 2018-03-07 PROCEDURE — 81003 URINALYSIS AUTO W/O SCOPE: CPT | Performed by: PHYSICIAN ASSISTANT

## 2018-03-07 PROCEDURE — 80053 COMPREHEN METABOLIC PANEL: CPT | Performed by: PHYSICIAN ASSISTANT

## 2018-03-07 PROCEDURE — 74011250636 HC RX REV CODE- 250/636: Performed by: PHYSICIAN ASSISTANT

## 2018-03-07 RX ORDER — ACETAMINOPHEN 325 MG/1
650 TABLET ORAL
Status: DISCONTINUED | OUTPATIENT
Start: 2018-03-07 | End: 2018-03-09 | Stop reason: HOSPADM

## 2018-03-07 RX ORDER — OSELTAMIVIR PHOSPHATE 75 MG/1
75 CAPSULE ORAL 2 TIMES DAILY
Status: DISCONTINUED | OUTPATIENT
Start: 2018-03-07 | End: 2018-03-07

## 2018-03-07 RX ORDER — LEVOTHYROXINE SODIUM 50 UG/1
50 TABLET ORAL
Status: DISCONTINUED | OUTPATIENT
Start: 2018-03-08 | End: 2018-03-09 | Stop reason: HOSPADM

## 2018-03-07 RX ORDER — SODIUM CHLORIDE 9 MG/ML
100 INJECTION, SOLUTION INTRAVENOUS ONCE
Status: COMPLETED | OUTPATIENT
Start: 2018-03-07 | End: 2018-03-07

## 2018-03-07 RX ORDER — IPRATROPIUM BROMIDE AND ALBUTEROL SULFATE 2.5; .5 MG/3ML; MG/3ML
3 SOLUTION RESPIRATORY (INHALATION)
Qty: 1 NEBULE | Refills: 0 | Status: SHIPPED | COMMUNITY
Start: 2018-03-07 | End: 2018-03-09

## 2018-03-07 RX ORDER — HYDROCODONE BITARTRATE AND ACETAMINOPHEN 5; 325 MG/1; MG/1
1 TABLET ORAL
Status: DISCONTINUED | OUTPATIENT
Start: 2018-03-07 | End: 2018-03-09 | Stop reason: HOSPADM

## 2018-03-07 RX ORDER — SODIUM CHLORIDE 9 MG/ML
50 INJECTION, SOLUTION INTRAVENOUS CONTINUOUS
Status: DISCONTINUED | OUTPATIENT
Start: 2018-03-07 | End: 2018-03-08

## 2018-03-07 RX ORDER — ASPIRIN 81 MG/1
81 TABLET ORAL DAILY
Status: DISCONTINUED | OUTPATIENT
Start: 2018-03-08 | End: 2018-03-09 | Stop reason: HOSPADM

## 2018-03-07 RX ORDER — DEXAMETHASONE SODIUM PHOSPHATE 4 MG/ML
10 INJECTION, SOLUTION INTRA-ARTICULAR; INTRALESIONAL; INTRAMUSCULAR; INTRAVENOUS; SOFT TISSUE
Status: COMPLETED | OUTPATIENT
Start: 2018-03-07 | End: 2018-03-07

## 2018-03-07 RX ORDER — MAGNESIUM SULFATE HEPTAHYDRATE 40 MG/ML
2 INJECTION, SOLUTION INTRAVENOUS
Status: COMPLETED | OUTPATIENT
Start: 2018-03-07 | End: 2018-03-07

## 2018-03-07 RX ORDER — HEPARIN SODIUM 5000 [USP'U]/ML
5000 INJECTION, SOLUTION INTRAVENOUS; SUBCUTANEOUS EVERY 8 HOURS
Status: DISCONTINUED | OUTPATIENT
Start: 2018-03-07 | End: 2018-03-09 | Stop reason: HOSPADM

## 2018-03-07 RX ORDER — OSELTAMIVIR PHOSPHATE 75 MG/1
75 CAPSULE ORAL 2 TIMES DAILY
Qty: 10 CAP | Refills: 0 | Status: SHIPPED | OUTPATIENT
Start: 2018-03-07 | End: 2018-03-12

## 2018-03-07 RX ORDER — IPRATROPIUM BROMIDE AND ALBUTEROL SULFATE 2.5; .5 MG/3ML; MG/3ML
3 SOLUTION RESPIRATORY (INHALATION)
Qty: 2 NEBULE | Refills: 0 | Status: SHIPPED | COMMUNITY
Start: 2018-03-07 | End: 2018-03-09

## 2018-03-07 RX ORDER — IPRATROPIUM BROMIDE AND ALBUTEROL SULFATE 2.5; .5 MG/3ML; MG/3ML
3 SOLUTION RESPIRATORY (INHALATION)
Status: DISCONTINUED | OUTPATIENT
Start: 2018-03-07 | End: 2018-03-09 | Stop reason: HOSPADM

## 2018-03-07 RX ORDER — IPRATROPIUM BROMIDE AND ALBUTEROL SULFATE 2.5; .5 MG/3ML; MG/3ML
3 SOLUTION RESPIRATORY (INHALATION)
Status: COMPLETED | OUTPATIENT
Start: 2018-03-07 | End: 2018-03-07

## 2018-03-07 RX ORDER — PREDNISONE 10 MG/1
TABLET ORAL
Qty: 21 TAB | Refills: 0 | Status: SHIPPED | OUTPATIENT
Start: 2018-03-07 | End: 2018-03-15 | Stop reason: ALTCHOICE

## 2018-03-07 RX ORDER — SODIUM CHLORIDE 0.9 % (FLUSH) 0.9 %
5-10 SYRINGE (ML) INJECTION AS NEEDED
Status: DISCONTINUED | OUTPATIENT
Start: 2018-03-07 | End: 2018-03-09 | Stop reason: HOSPADM

## 2018-03-07 RX ADMIN — HEPARIN SODIUM 5000 UNITS: 5000 INJECTION, SOLUTION INTRAVENOUS; SUBCUTANEOUS at 18:46

## 2018-03-07 RX ADMIN — SODIUM CHLORIDE 2940 ML: 900 INJECTION, SOLUTION INTRAVENOUS at 14:13

## 2018-03-07 RX ADMIN — AZITHROMYCIN MONOHYDRATE 500 MG: 500 INJECTION, POWDER, LYOPHILIZED, FOR SOLUTION INTRAVENOUS at 14:18

## 2018-03-07 RX ADMIN — WATER 2 G: 1 INJECTION INTRAMUSCULAR; INTRAVENOUS; SUBCUTANEOUS at 14:13

## 2018-03-07 RX ADMIN — DEXAMETHASONE SODIUM PHOSPHATE 10 MG: 4 INJECTION, SOLUTION INTRAMUSCULAR; INTRAVENOUS at 16:20

## 2018-03-07 RX ADMIN — IPRATROPIUM BROMIDE AND ALBUTEROL SULFATE 3 ML: .5; 3 SOLUTION RESPIRATORY (INHALATION) at 16:19

## 2018-03-07 RX ADMIN — SODIUM CHLORIDE 97 ML: 900 INJECTION, SOLUTION INTRAVENOUS at 14:46

## 2018-03-07 RX ADMIN — METHYLPREDNISOLONE SODIUM SUCCINATE 40 MG: 40 INJECTION, POWDER, FOR SOLUTION INTRAMUSCULAR; INTRAVENOUS at 18:46

## 2018-03-07 RX ADMIN — ACETAMINOPHEN 650 MG: 325 TABLET, FILM COATED ORAL at 16:29

## 2018-03-07 RX ADMIN — IPRATROPIUM BROMIDE AND ALBUTEROL SULFATE 3 ML: .5; 3 SOLUTION RESPIRATORY (INHALATION) at 21:25

## 2018-03-07 RX ADMIN — SODIUM CHLORIDE 50 ML/HR: 900 INJECTION, SOLUTION INTRAVENOUS at 17:41

## 2018-03-07 RX ADMIN — ACETAMINOPHEN 650 MG: 325 TABLET, FILM COATED ORAL at 19:50

## 2018-03-07 RX ADMIN — MAGNESIUM SULFATE HEPTAHYDRATE 2 G: 40 INJECTION, SOLUTION INTRAVENOUS at 16:19

## 2018-03-07 RX ADMIN — IOPAMIDOL 66 ML: 755 INJECTION, SOLUTION INTRAVENOUS at 14:46

## 2018-03-07 NOTE — ROUTINE PROCESS
1710- Assumed care. Pt placed on droplet precaution for influenza. Pt is alert and oriented x 4. No respiratory distress noted. C/o 8/10 back and head pain. States she \"just received tylenol in ER\". Call light in reach. Will continue to monitor. 1850- Due meds given. Tolerated well. NAD. Bedside and Verbal shift change report given to [address] (oncoming nurse) by Barbara Matute RN   (offgoing nurse). Report included the following information SBAR, Kardex, Procedure Summary, Intake/Output, MAR, Recent Results and Med Rec Status.

## 2018-03-07 NOTE — ED PROVIDER NOTES
EMERGENCY DEPARTMENT HISTORY AND PHYSICAL EXAM    1:06 PM      Date: 3/7/2018  Patient Name: Yenny Howard    History of Presenting Illness     Chief Complaint   Patient presents with    Flu    Other         History Provided By: Patient    Chief Complaint: Cough  Duration: 1 Week  Timing:  Constant  Location: Chest  Quality: productive   Severity: Moderate  Modifying Factors: none reported  Associated Symptoms: HA, fever, SOB, congestion      Additional History (Context): Yenny Howard is a 67 y.o. female with hashimoto's thyroiditis who presents with productive cough x1wk. Associated sx include HA, SOB, congestion, fever 99.8F. Pt denies vomit, CP. Pt received O2, steroid shot, and 2 breathing treatments at an urgent care PTA but was still hypoxic with spO2 of 89%. Pt with positive influenza test. She does not use home O2 and no hx COPD, self-reported. Former smoker for 30 years. PCP: Jenna Uriostegui,     Current Facility-Administered Medications   Medication Dose Route Frequency Provider Last Rate Last Dose    sodium chloride (NS) flush 5-10 mL  5-10 mL IntraVENous PRN RIKKI Pineda        cefTRIAXone (ROCEPHIN) 2 g in sterile water (preservative free) 20 mL IV syringe  2 g IntraVENous Q24H RIKKI Pineda   2 g at 03/07/18 1413    azithromycin (ZITHROMAX) 500 mg in 0.9% sodium chloride (MBP/ADV) 250 mL adv  500 mg IntraVENous Q24H RIKKI Quintana 250 mL/hr at 03/07/18 1418 500 mg at 03/07/18 1418     Current Outpatient Prescriptions   Medication Sig Dispense Refill    albuterol-ipratropium (DUO-NEB) 2.5 mg-0.5 mg/3 ml nebu 3 mL by Nebulization route now for 1 dose. 2 Nebule 0    methylPREDNISolone, PF, (SOLU-MEDROL) 125 mg/2 mL solr 2 mL by IntraVENous route once for 1 dose. 1 Vial 0    oseltamivir (TAMIFLU) 75 mg capsule Take 1 Cap by mouth two (2) times a day for 5 days.  10 Cap 0    predniSONE (STERAPRED DS) 10 mg dose pack See administration instruction per 10mg dose pack 21 Tab 0    albuterol-ipratropium (DUO-NEB) 2.5 mg-0.5 mg/3 ml nebu 3 mL by Nebulization route now for 1 dose. 1 Nebule 0    levothyroxine (SYNTHROID) 50 mcg tablet Take 1 Tab by mouth Daily (before breakfast). 90 Tab 3    ATROVENT HFA 17 mcg/actuation inhaler  TAKE 1 PUFF BY INHALATION EVERY 4 HOURS AS NEEDED FOR WHEEZING 12.9 Inhaler 3    ipratropium (ATROVENT HFA) 17 mcg/actuation inhaler Take 1 Puff by inhalation every four (4) hours as needed for Wheezing. 1 Inhaler 3    omega-3 fatty acids-vitamin e (FISH OIL) 1,000 mg cap Take 1 capsule by mouth.  calcium-cholecalciferol, D3, (CALTRATE 600+D) tablet Take 1 tablet by mouth daily.  aspirin delayed-release 81 mg tablet Take  by mouth daily. Past History     Past Medical History:  Past Medical History:   Diagnosis Date    Acute bronchitis 12/28/2015    Advance directive discussed with patient 5/4/2016    Pt would like to appoint her son, Angella Dasilva as her medical decision maker in the event that she can no longer do so. Phone number is [phone]. [facility] Phone number is [phone]. If her death is imminent and medical treatment will not help her recover, pt does want life prolonging measures.   If her condition makes her unawar    Bilateral leg pain 10/22/2015    Elevated TSH 10/22/2015    Hashimoto's thyroiditis 12/28/2015    Hernia, abdominal     History of benign breast tumor     Menopause     Nicotine dependence in remission 2/4/2016    Obesity, Class I, BMI 30-34.9 2/4/2016    Prediabetes 10/22/2015    Simple chronic bronchitis ([address] 5/4/2016    Skin lesion 2/4/2016    Varicose vein of leg        Past Surgical History:  Past Surgical History:   Procedure Laterality Date    HX BREAST BIOPSY Right     Milk ducts removed    HX CYST INCISION AND DRAINAGE Right     37 y/o    HX HERNIA REPAIR      abdominal x2    HX HERNIA REPAIR  09/15/2016 ROBOTIC REPAIR OF RECURRENT INCARCERATED HERNIA WITH EXTENSIVE LYSIS OF ADHESIONS WITH PLACEMENT OF MESH       Family History:  Family History   Problem Relation Age of Onset    Breast Cancer Mother 38     36s    Lung Disease Mother     Elevated Lipids Mother     Stroke Father     Diabetes Paternal Grandfather        Social History:  Social History   Substance Use Topics    Smoking status: Former Smoker     Packs/day: 1.00     Years: 30.00     Quit date: 1/1/1992    Smokeless tobacco: Never Used    Alcohol use 1.2 oz/week     2 Glasses of wine per week      Comment: Occasionally        Allergies:  No Known Allergies      Review of Systems       Review of Systems   Constitutional: Positive for fever. HENT: Positive for congestion. Negative for trouble swallowing. Respiratory: Positive for cough and shortness of breath. Cardiovascular: Negative for chest pain. Gastrointestinal: Negative for abdominal pain and vomiting. Neurological: Positive for headaches. All other systems reviewed and are negative.         Physical Exam     Visit Vitals    /58    Pulse 83    Temp 98.9 °F (37.2 °C)    Resp 21    Ht 5' 8\" (1.727 m)    Wt 98 kg (216 lb)    SpO2 91%    BMI 32.84 kg/m2         Physical Exam      Diagnostic Study Results     Labs -  Recent Results (from the past 12 hour(s))   AMB POC RAPID INFLUENZA TEST    Collection Time: 03/07/18 10:45 AM   Result Value Ref Range    VALID INTERNAL CONTROL POC Yes     QuickVue Influenza test Positive Negative   CULTURE, BLOOD    Collection Time: 03/07/18  1:25 PM   Result Value Ref Range    Special Requests: PERIPHERAL      Culture result: PENDING    METABOLIC PANEL, COMPREHENSIVE    Collection Time: 03/07/18  1:45 PM   Result Value Ref Range    Sodium 140 136 - 145 mmol/L    Potassium 3.9 3.5 - 5.5 mmol/L    Chloride 103 100 - 108 mmol/L    CO2 31 21 - 32 mmol/L    Anion gap 6 3.0 - 18 mmol/L    Glucose 130 (H) 74 - 99 mg/dL    BUN 16 7.0 - 18 MG/DL Creatinine 0.90 0.6 - 1.3 MG/DL    BUN/Creatinine ratio 18 12 - 20      GFR est AA >60 >60 ml/min/1.73m2    GFR est non-AA >60 >60 ml/min/1.73m2    Calcium 8.4 (L) 8.5 - 10.1 MG/DL    Bilirubin, total 0.3 0.2 - 1.0 MG/DL    ALT (SGPT) 17 13 - 56 U/L    AST (SGOT) 18 15 - 37 U/L    Alk. phosphatase 98 45 - 117 U/L    Protein, total 8.5 (H) 6.4 - 8.2 g/dL    Albumin 3.9 3.4 - 5.0 g/dL    Globulin 4.6 (H) 2.0 - 4.0 g/dL    A-G Ratio 0.8 0.8 - 1.7     CBC WITH AUTOMATED DIFF    Collection Time: 03/07/18  1:45 PM   Result Value Ref Range    WBC 6.7 4.6 - 13.2 K/uL    RBC 4.45 4.20 - 5.30 M/uL    HGB 14.1 12.0 - 16.0 g/dL    HCT 42.6 35.0 - 45.0 %    MCV 95.7 74.0 - 97.0 FL    MCH 31.7 24.0 - 34.0 PG    MCHC 33.1 31.0 - 37.0 g/dL    RDW 13.1 11.6 - 14.5 %    PLATELET 947 775 - 813 K/uL    MPV 10.2 9.2 - 11.8 FL    NEUTROPHILS 92 (H) 40 - 73 %    LYMPHOCYTES 6 (L) 21 - 52 %    MONOCYTES 2 (L) 3 - 10 %    EOSINOPHILS 0 0 - 5 %    BASOPHILS 0 0 - 2 %    ABS. NEUTROPHILS 6.2 1.8 - 8.0 K/UL    ABS. LYMPHOCYTES 0.4 (L) 0.9 - 3.6 K/UL    ABS. MONOCYTES 0.1 0.05 - 1.2 K/UL    ABS. EOSINOPHILS 0.0 0.0 - 0.4 K/UL    ABS.  BASOPHILS 0.0 0.0 - 0.06 K/UL    DF AUTOMATED     NT-PRO BNP    Collection Time: 03/07/18  1:45 PM   Result Value Ref Range    NT pro- 0 - 900 PG/ML   CARDIAC PANEL,(CK, CKMB & TROPONIN)    Collection Time: 03/07/18  1:45 PM   Result Value Ref Range    CK 74 26 - 192 U/L    CK - MB 1.0 <3.6 ng/ml    CK-MB Index 1.4 0.0 - 4.0 %    Troponin-I, Qt. <0.02 0.0 - 0.045 NG/ML   LACTIC ACID    Collection Time: 03/07/18  1:50 PM   Result Value Ref Range    Lactic acid 3.4 (HH) 0.4 - 2.0 MMOL/L   POC LACTIC ACID    Collection Time: 03/07/18  2:00 PM   Result Value Ref Range    Lactic Acid (POC) 3.0 (HH) 0.4 - 2.0 mmol/L   EKG, 12 LEAD, INITIAL    Collection Time: 03/07/18  2:01 PM   Result Value Ref Range    Ventricular Rate 80 BPM    Atrial Rate 80 BPM    P-R Interval 162 ms    QRS Duration 90 ms    Q-T Interval 388 ms    QTC Calculation (Bezet) 447 ms    Calculated P Axis 34 degrees    Calculated R Axis 86 degrees    Calculated T Axis 30 degrees    Diagnosis       Normal sinus rhythm  Nonspecific T wave abnormality  Normal ECG  When compared with ECG of 15-SEP-2016 12:47,  T wave inversion no longer evident in Inferior leads  T wave inversion no longer evident in Anterior leads  QT has shortened  Confirmed by Suyapa Berry ([id number]) on 3/7/2018 2:35:51 PM         Radiologic Studies -   XR CHEST PORT   Final Result   As read by RAD:    Impression:     1. Patchy infiltrates at both lung bases       CTA CHEST W OR W WO CONT   Final Result   As read by RAD:    IMPRESSION:     1. No evidence of pulmonary embolism.     2.  Enlargement of the main pulmonary artery, in keeping with underlying  pulmonary arterial hypertension.     3. Moderately severe, upper lobe predominant centrilobular emphysema.     4. Relatively diffuse, lower lung predominant peribronchial thickening, with  small foci of distal endobronchial mucoid impaction.     5. Small hiatal hernia           Medical Decision Making   I am the first provider for this patient. I reviewed the vital signs, available nursing notes, past medical history, past surgical history, family history and social history. Vital Signs-Reviewed the patient's vital signs. Pulse Oximetry Analysis -  93% on 4L of O2 via NC     Cardiac Monitor:  Rate: 99      Records Reviewed: Nursing Notes and Old Medical Records (Time of Review: 1:06 PM)    ED Course: Progress Notes, Reevaluation, and Consults:    13:00 - I suspect that this patient has an active infection. 15:30 - The patient met criteria for severe sepsis at this time. Lactic acid of 3.4 reported. 15:48 Consult:  Discussed care with Dr. Henry Ortega (Hospitalist). Standard discussion; including history of patients chief complaint, available diagnostic results, and treatment course. Will evaluate the pt.          PROVIDER SEPSIS PHYSICAL EXAM EVAL  Vital signs reviewed (see nursing documentation for further details):  Vitals:    03/07/18 1250 03/07/18 1407 03/07/18 1411 03/07/18 1415   BP: 145/79 137/65  135/58   Pulse: 99  84 83   Resp: 21      Temp: 98.9 °F (37.2 °C)      SpO2: (!) 83% 92% 93% 91%       Cardiac exam:Regular Rate    Pulmonary exam:Normal    Peripheral pulses:Normal    Capillary refill:Normal    Skin exam:pink    Exam performed by  Chasidy Gunter MD    SEP-1 Core Measure Exclusion Criteria  No abnormal lab values due         Provider Notes (Medical Decision Making):           Critical Care Time:     CRITICAL CARE:  14:00  I have spent 35 minutes of critical care time involved in lab review, consultations with specialist, family decision-making, and documentation. During this entire length of time I was immediately available to the patient. Critical Care: The reason for providing this level of medical care for this critically ill patient was due a critical illness that impaired one or more vital organ systems such that there was a high probability of imminent or life threatening deterioration in the patients condition. This care involved high complexity decision making to assess, manipulate, and support vital system functions, to treat this degreee vital organ system failure and to prevent further life threatening deterioration of the patients condition. EKG - NSR (rate 80) - nl axis, nl intervals no ischemia or ectopy      For Hospitalized Patients:    1. Hospitalization Decision Time:  The decision to hospitalize the patient was made by Dr. Peter Becker at 15:30 on 3/7/2018    2. Aspirin: Aspirin was not given because the patient did not present with a stroke at the time of their Emergency Department evaluation    Diagnosis     Clinical Impression: No diagnosis found.     Disposition: Admit      Patient's Medications   Start Taking    No medications on file   Continue Taking    ALBUTEROL-IPRATROPIUM (DUO-NEB) 2.5 MG-0.5 MG/3 ML NEBU    3 mL by Nebulization route now for 1 dose. ALBUTEROL-IPRATROPIUM (DUO-NEB) 2.5 MG-0.5 MG/3 ML NEBU    3 mL by Nebulization route now for 1 dose. ASPIRIN DELAYED-RELEASE 81 MG TABLET    Take  by mouth daily. ATROVENT HFA 17 MCG/ACTUATION INHALER     TAKE 1 PUFF BY INHALATION EVERY 4 HOURS AS NEEDED FOR WHEEZING    CALCIUM-CHOLECALCIFEROL, D3, (CALTRATE 600+D) TABLET    Take 1 tablet by mouth daily. IPRATROPIUM (ATROVENT HFA) 17 MCG/ACTUATION INHALER    Take 1 Puff by inhalation every four (4) hours as needed for Wheezing. LEVOTHYROXINE (SYNTHROID) 50 MCG TABLET    Take 1 Tab by mouth Daily (before breakfast). METHYLPREDNISOLONE, PF, (SOLU-MEDROL) 125 MG/2 ML SOLR    2 mL by IntraVENous route once for 1 dose. OMEGA-3 FATTY ACIDS-VITAMIN E (FISH OIL) 1,000 MG CAP    Take 1 capsule by mouth. OSELTAMIVIR (TAMIFLU) 75 MG CAPSULE    Take 1 Cap by mouth two (2) times a day for 5 days. PREDNISONE (STERAPRED DS) 10 MG DOSE PACK    See administration instruction per 10mg dose pack   These Medications have changed    No medications on file   Stop Taking    No medications on file     _______________________________    Attestations:  Scribe [address] [place] [address] acting as a scribe for and in the presence of Kathy Field MD      March 07, 2018 at 3:50 PM       Provider Attestation:      I personally performed the services described in the documentation, reviewed the documentation, as recorded by the scribe in my presence, and it accurately and completely records my words and actions.  March 07, 2018 at 3:50 PM - Kathy Field MD    _______________________________

## 2018-03-07 NOTE — ED NOTES
SEPSIS SUMMARY    · TIME ZERO (triage time): 1400    · 2 Sets Blood Cultures Drawn? YES    · Initial POC Lactic time drawn? 1400  · 1630  · y  ·         Repeat drawn? YES     · POC lactic acid results      Lab Results   Component Value Date    LACPOC 3.0 (HH) 03/07/2018    LACPOC 1.4 08/19/2016       REPEAT POC LACTIC ACID REQUIRED IN 4 HOURS OR PRIOR TO ADMISSION                      FOR ALL INITIAL POC LACTIC ACID GREATER THAN 2     · First Antibiotic started within 30 min of TIME ZERO starting with MONOTHERAPY first? YES    · All ordered antibiotics initiated within first 3 hours of TIME ZERO? YES    · Target fluid bolus 30ml/kg infused for Lactic Acid > 4 OR SYS < 90 and/or MAP < 65 x 1 reading? YES    · Bolus amt: 3000  · 1400  ·                   Bolus start time: 1400                    Bolus end time: na    · Vital signs monitored and validated every 15 min during bolus? YES     · Vital signs monitored and validated every 15 min x 1 hour after bolus complete? YES     2 CONSECUTIVE SYSTOLIC READINGS < 90 AND/OR MAP < THAN 65, NOTIFY PROVIDER IMMEDIATELY FOR PRESSOR ORDER    · If patient admitted/care transferred prior to bolus monitor timeframe complete (during bolus and 1 hr after completion), time remaining reported during handover? YES    · ED Provider has completed . [id number] within 3-6 hours of time zero or prior to admission?  YES    Vital signs:  Patient Vitals for the past 4 hrs:   Temp Pulse Resp BP SpO2   03/07/18 1600 - 80 - 137/51 94 %   03/07/18 1545 - 78 - 136/52 92 %   03/07/18 1530 - 81 - (!) 82/54 90 %   03/07/18 1515 - 82 - 121/49 90 %   03/07/18 1415 - 83 - 135/58 91 %   03/07/18 1411 - 84 - - 93 %   03/07/18 1407 - - - 137/65 92 %   03/07/18 1250 98.9 °F (37.2 °C) 99 21 145/79 (!) 83 %       MAP (Monitor): 71    =monitored (data validate)  MAP (Calculated): 101    =calculated (manual entry)    Additional Labs:    WBC:    Lab Results   Component Value Date/Time    WBC 6.7 03/07/2018 01:45 PM     Bilirubin:    Lab Results   Component Value Date/Time    Bilirubin, total 0.3 03/07/2018 01:45 PM    Bilirubin, direct 0.2 08/19/2016 07:35 PM    Bilirubin NEGATIVE  08/19/2016 07:02 PM     INR:  No results found for: INR, PTMR, PTP, PT1, PT2  Creatinine:  Lab Results   Component Value Date/Time    Creatinine 0.90 03/07/2018 01:45 PM     Platelet Count:    Lab Results   Component Value Date/Time    PLATELET 672 89/82/8434 01:45 PM

## 2018-03-07 NOTE — IP AVS SNAPSHOT
[address] [address] 
[phone] Patient: Lizzie Christian MRN: [id number] KER:[id number] About your hospitalization You were admitted on:  March 7, 2018 You last received care in the:  [address] [place] [address] Road You were discharged on:  March 9, 2018 Why you were hospitalized Your primary diagnosis was:  Copd With Exacerbation (Hcc) Your diagnoses also included:  Hashimoto's Thyroiditis, Hyperlipidemia With Target Ldl Less Than 70, Influenza, Obesity, Class I, Bmi 30-34.9, Acute Bronchitis Follow-up Information Follow up With Details Comments Contact Info Azeem Beverly, DO In 1 week  [address] [facility] [phone] [place] [phone] 
  
 pulmonary  upon recomendation of pcp Discharge Orders None A check vamsi indicates which time of day the medication should be taken. My Medications START taking these medications Instructions Each Dose to Equal  
 Morning Noon Evening Bedtime  
 azithromycin 250 mg tablet Commonly known as:  Monty Geronimo Your last dose was: Your next dose is: Take 1 Tab by mouth daily for 3 days. 250 mg CONTINUE taking these medications Instructions Each Dose to Equal  
 Morning Noon Evening Bedtime  
 aspirin delayed-release 81 mg tablet Your last dose was: Your next dose is: Take  by mouth daily. * ipratropium 17 mcg/actuation inhaler Commonly known as:  ATROVENT HFA Your last dose was: Your next dose is: Take 1 Puff by inhalation every four (4) hours as needed for Wheezing. 1 Puff * ATROVENT HFA 17 mcg/actuation inhaler Generic drug:  ipratropium Your last dose was: Your next dose is: TAKE 1 PUFF BY INHALATION EVERY 4 HOURS AS NEEDED FOR WHEEZING  
     
   
   
   
  
 calcium-cholecalciferol (D3) tablet Commonly known as:  CALTRATE 600+D Your last dose was: Your next dose is: Take 1 tablet by mouth daily. 1 Tab FISH OIL 1,000 mg Cap Generic drug:  omega-3 fatty acids-vitamin e Your last dose was: Your next dose is: Take 1 capsule by mouth. 1 Cap  
    
   
   
   
  
 levothyroxine 50 mcg tablet Commonly known as:  SYNTHROID Your last dose was: Your next dose is: Take 1 Tab by mouth Daily (before breakfast). 50 mcg  
    
   
   
   
  
 oseltamivir 75 mg capsule Commonly known as:  TAMIFLU Your last dose was: Your next dose is: Take 1 Cap by mouth two (2) times a day for 5 days. 75 mg  
    
   
   
   
  
 predniSONE 10 mg dose pack Commonly known as:  STERAPRED DS Your last dose was: Your next dose is:    
   
   
 See administration instruction per 10mg dose pack * Notice: This list has 2 medication(s) that are the same as other medications prescribed for you. Read the directions carefully, and ask your doctor or other care provider to review them with you. STOP taking these medications   
 albuterol-ipratropium 2.5 mg-0.5 mg/3 ml Nebu Commonly known as:  DUO-NEB  
   
  
 methylPREDNISolone (PF) 125 mg/2 mL Solr Commonly known as:  SOLU-MEDROL Where to Get Your Medications These medications were sent to [address] [place] [id number] Phone:  [phone]  
  azithromycin 250 mg tablet Discharge Instructions DISCHARGE SUMMARY from Nurse PATIENT INSTRUCTIONS: 
 
 
F-face looks uneven A-arms unable to move or move unevenly S-speech slurred or non-existent T-time-call 911 as soon as signs and symptoms begin-DO NOT go Back to bed or wait to see if you get better-TIME IS BRAIN. Warning Signs of HEART ATTACK Call 911 if you have these symptoms: 
? Chest discomfort. Most heart attacks involve discomfort in the center of the chest that lasts more than a few minutes, or that goes away and comes back. It can feel like uncomfortable pressure, squeezing, fullness, or pain. ? Discomfort in other areas of the upper body. Symptoms can include pain or discomfort in one or both arms, the back, neck, jaw, or stomach. ? Shortness of breath with or without chest discomfort. ? Other signs may include breaking out in a cold sweat, nausea, or lightheadedness. Don't wait more than five minutes to call [address]! Fast action can save your life. Calling 911 is almost always the fastest way to get lifesaving treatment. Emergency Medical Services staff can begin treatment when they arrive  up to an hour sooner than if someone gets to the hospital by car. Patient armband removed and shredded. The discharge information has been reviewed with the patient. The patient verbalized understanding. Discharge medications reviewed with the patient and appropriate educational materials and side effects teaching were provided. ___________________________________________________________________________________________________________________________________ [employer] Announcement We are excited to announce that we are making your provider's discharge notes available to you in [employer].   You will see these notes when they are completed and signed by the physician that discharged you from your recent hospital stay. If you have any questions or concerns about any information you see in [employer], please call the Health Information Department where you were seen or reach out to your Primary Care Provider for more information about your plan of care. Introducing [facility] HE[facility]! Dear Lilyan Barthel: Thank you for requesting a Vobile account. Our records indicate that you already have an active Vobile account. You can access your account anytime at [email or web address] Did you know that you can access your hospital and ER discharge instructions at any time in Vobile? You can also review all of your test results from your hospital stay or ER visit. Additional Information If you have questions, please visit the Frequently Asked Questions section of the Vobile website at [email or web address] Remember, Vobile is NOT to be used for urgent needs. For medical emergencies, dial 911. Now available from your iPhone and Android! Unresulted Labs-Please follow up with your PCP about these lab tests Order Current Status CULTURE, BLOOD Preliminary result CULTURE, BLOOD Preliminary result Providers Seen During Your Hospitalization Provider Specialty Primary office phone Katie Favre, MD Emergency Medicine [phone] Rufino Ruiz DO Internal Medicine [phone] Your Primary Care Physician (PCP) Primary Care Physician Office Phone Office Fax Brannon Bustillos [phone] You are allergic to the following No active allergies Recent Documentation Height Weight BMI OB Status Smoking Status 1.727 m 104.3 kg 34.97 kg/m2 Postmenopausal Former Smoker Emergency Contacts Name Discharge Info Relation Home Work Mobile [facility] DISCHARGE CAREGIVER [3] Friend [5] [phone] Carter Das  Son [22] [phone] Patient Belongings The following personal items are in your possession at time of discharge: 
  Dental Appliances: None  Visual Aid: Glasses, At bedside      Home Medications: None   Jewelry: Ring (1 ring)  Clothing: With patient, Pants, Jacket/Coat, Footwear, Sweater    Other Valuables: Cell Phone, [employer] Discharge Instructions Attachments/References AZITHROMYCIN (BY MOUTH) (ENGLISH) PREDNISONE (BY MOUTH) (ENGLISH) COPD: EXACERBATION (ENGLISH) Patient Handouts Azithromycin (By mouth) Azithromycin (rv-rrry-kzu-MYE-sin) Treats infections. This medicine is a macrolide antibiotic. Brand Name(s): Zithromax, Zithromax Tri-Ziggy, Zithromax Z-Ziggy, Zmax There may be other brand names for this medicine. When This Medicine Should Not Be Used: This medicine is not right for everyone. Do not use it if you had an allergic reaction to azithromycin, erythromycin, or similar medicines, or you have a history of liver problems caused by azithromycin. How to Use This Medicine:  
Capsule, Liquid, Packet, Powder, Tablet · Your doctor will tell you how much medicine to use. Do not use more than directed. · Take all of the medicine in your prescription to clear up your infection, even if you feel better after the first few doses. · Read and follow the patient instructions that come with this medicine. Talk to your doctor or pharmacist if you have any questions. · Multiple dose (Zithromax® oral liquid or tablets): ¨ You may take this medicine with or without food. ¨ Shake the bottle well before you measure the medicine. Measure the oral liquid medicine with a marked measuring spoon, oral syringe, or medicine cup. · Single dose (Zmax® extended-release oral liquid or powder):  
¨ Liquid: § Take this medicine on an empty stomach at least 1 hour before you eat, or 2 hours after you eat. § Call your doctor right away if you vomit within 1 hour after you take the medicine. § You must take the liquid within 12 hours after the pharmacist gives it to you. § Shake the bottle well before you measure the medicine. Measure your dose with a marked measuring spoon, cup, or syringe. ¨ Powder:  
§ Open 1 packet and pour all of the medicine into a glass with about 2 ounces (¼ cup) of water. Mix well and drink the medicine right away. Pour another 2 ounces of water into the same glass, and drink the remaining medicine. · Missed dose: If you are taking multiple doses, take the dose as soon as you remember. If it is almost time for your next dose, wait until then to take a regular dose. Do not use extra medicine to make up for a missed dose. · Store the medicine in a closed container at room temperature, away from heat, moisture, and direct light. · Extended-release oral liquid: Do not refrigerate or freeze. · Oral liquid for 1 dose only: Store at room temperature. Do not store in the refrigerator or allow the medicine to freeze. · Oral liquid for multiple doses: Store at room temperature or in the refrigerator. Use it within 10 days of filling the prescription. Drugs and Foods to Avoid: Ask your doctor or pharmacist before using any other medicine, including over-the-counter medicines, vitamins, and herbal products. · Some medicines can affect how azithromycin works. Tell your doctor if you are also using any of the following: ¨ Cyclosporine, digoxin, nelfinavir, or phenytoin ¨ Blood thinner ¨ Ergot medicine ¨ Medicine for a heart rhythm problem (including amiodarone, dofetilide, procainamide, quinidine, sotalol) · Zithromax® for multiple doses: Do not take an antacid that contains magnesium or aluminum at the same time you take Zithromax®. An antacid will affect how the medicine works. Antacids will not affect Zmax® for single dose. Warnings While Using This Medicine: · Tell your doctor if you are pregnant or breastfeeding, or if you have kidney disease, liver disease, heart disease, heart rhythm problems, heart failure, or myasthenia gravis. Tell your doctor if anyone in your family has heart rhythm problems. · This medicine may cause the following problems:  
¨ Serious skin reactions ¨ Liver problems ¨ Infantile hypertrophic pyloric stenosis ¨ Heart rhythm problems · This medicine can cause diarrhea. Call your doctor if the diarrhea becomes severe, does not stop, or is bloody. Do not take any medicine to stop diarrhea until you have talked to your doctor. Diarrhea can occur 2 months or more after you stop taking this medicine. It may occur 2 months or more after you stop using this medicine. · Call your doctor if your symptoms do not improve or if they get worse. · Keep all medicine out of the reach of children. Never share your medicine with anyone. Possible Side Effects While Using This Medicine:  
Call your doctor right away if you notice any of these side effects: · Allergic reaction: Itching or hives, swelling in your face or hands, swelling or tingling in your mouth or throat, chest tightness, trouble breathing · Blistering, peeling, red skin rash · Dark urine, pale stools, nausea, vomiting, loss of appetite, stomach pain, yellow skin or eyes · Double vision, tiredness or weakness · Fainting, dizziness, lightheadedness · Fast, pounding, or uneven heartbeat, chest pain · Feeling irritable or vomits after feeding (in babies) · Severe diarrhea that may contain blood, stomach cramps, fever If you notice these less serious side effects, talk with your doctor: · Mild diarrhea, nausea, vomiting, stomach pain If you notice other side effects that you think are caused by this medicine, tell your doctor. Call your doctor for medical advice about side effects. You may report side effects to FDA at 9-852-PMI-8794 © 2017 [address]  Information is for End User's use only and may not be sold, redistributed or otherwise used for commercial purposes. The above information is an  only. It is not intended as medical advice for individual conditions or treatments. Talk to your doctor, nurse or pharmacist before following any medical regimen to see if it is safe and effective for you. Prednisone (By mouth) Prednisone (PRED-ni-sone) Treats many diseases and conditions, especially problems related to inflammation. This medicine is a corticosteroid. Brand Name(s): Contrast Allergy PreMed Pack, Sean, predniSONE Intensol There may be other brand names for this medicine. When This Medicine Should Not Be Used: This medicine is not right for everyone. Do not use if you had an allergic reaction to prednisone or if you are pregnant. How to Use This Medicine:  
Liquid, Tablet, Delayed Release Tablet · Take your medicine as directed. Your dose may need to be changed several times to find what works best for you. · It is best to take this medicine with food or milk. · Swallow the delayed-release tablet whole. Do not crush, break, or chew it. · Measure the oral liquid medicine with a marked measuring spoon, oral syringe, or medicine cup. · Missed dose: Take a dose as soon as you remember. If it is almost time for your next dose, wait until then and take a regular dose. Do not take extra medicine to make up for a missed dose. · Store the medicine in a closed container at room temperature, away from heat, moisture, and direct light. Do not freeze the oral liquid. Drugs and Foods to Avoid: Ask your doctor or pharmacist before using any other medicine, including over-the-counter medicines, vitamins, and herbal products. · Tell your doctor if you use any of the following: ¨ Aminoglutethimide, amphotericin B, carbamazepine, cholestyramine, cyclosporine, digoxin, isoniazid, ketoconazole, phenobarbital, phenytoin, or rifampin ¨ Blood thinner, such as warfarin ¨ NSAID pain or arthritis medicine, such as aspirin, diclofenac, ibuprofen, naproxen, celecoxib ¨ Diuretic (water pill) ¨ Diabetes medicine ¨ Macrolide antibiotic, such as azithromycin, clarithromycin, erythromycin ¨ Estrogen, including birth control pills or hormone replacement therapy · This medicine may interfere with vaccines. Ask your doctor before you get a flu shot or any other vaccines. Warnings While Using This Medicine: · It is not safe to take this medicine during pregnancy. It could harm an unborn baby. Tell your doctor right away if you become pregnant. · Tell your doctor if you are breastfeeding or if you have kidney problems, heart failure, high blood pressure, a recent heart attack, diabetes, glaucoma, osteoporosis, or thyroid problems. Tell your doctor about any infection you have. Also tell your doctor if you have had mental or emotional problems (such as depression) or stomach or bowel problems (such as an ulcer or diverticulitis). · This medicine may cause the following problems: ¨ Mood or behavior changes ¨ Higher blood pressure, retaining water, changes in salt or potassium levels in your body ¨ Cataracts or glaucoma (with long-term use) ¨ Weak bones or osteoporosis (with long-term use) ¨ Slow growth in children (with long-term use) ¨ Muscle problems (with high doses, especially if you have myasthenia gravis or similar nerve and muscle problems) · Do not stop using this medicine suddenly. Your doctor will need to slowly decrease your dose before you stop it completely. · This medicine could cause you to get infections more easily. Tell your doctor right away if you are exposed to chicken pox, measles, or other serious infection. Tell your doctor if you had a serious infection in the past, such as tuberculosis or herpes. · Tell your doctor about any extra stress or anxiety in your life. Your dose might need to be changed for a short time. · Tell any doctor or dentist who treats you that you are using this medicine. This medicine may affect certain medical test results. · Keep all medicine out of the reach of children. Never share your medicine with anyone. Possible Side Effects While Using This Medicine:  
Call your doctor right away if you notice any of these side effects: · Allergic reaction: Itching or hives, swelling in your face or hands, swelling or tingling in your mouth or throat, chest tightness, trouble breathing · Dark freckles, skin color changes, coldness, weakness, tiredness, nausea, vomiting, weight loss · Depression, unusual thoughts, feelings, or behaviors, trouble sleeping · Fever, chills, cough, sore throat, and body aches · Muscle pain or weakness · Rapid weight gain, swelling in your hands, ankles, or feet · Severe stomach pain, nausea, vomiting, or red or black stools · Skin changes or growths · Trouble seeing, eye pain, headache If you notice these less serious side effects, talk with your doctor: · Increased appetite · Round, puffy face · Weight gain around your neck, upper back, breast, face, or waist 
If you notice other side effects that you think are caused by this medicine, tell your doctor. Call your doctor for medical advice about side effects. You may report side effects to FDA at 8-891-FDA-9361 © 2017 [address] Information is for End User's use only and may not be sold, redistributed or otherwise used for commercial purposes. The above information is an  only. It is not intended as medical advice for individual conditions or treatments. Talk to your doctor, nurse or pharmacist before following any medical regimen to see if it is safe and effective for you. Chronic Obstructive Pulmonary Disease (COPD) Flare-Ups: Care Instructions Your Care Instructions Chronic obstructive pulmonary disease (COPD) is a lung disease that makes it hard to breathe. It is caused by damage to the lungs over many years, usually from smoking. COPD is often a mix of two diseases: · Chronic bronchitis: The airways that carry air to the lungs (bronchial tubes) get inflamed and make a lot of mucus. This can narrow or block the airways. · Emphysema: In a healthy person, the tiny air sacs in the lungs are like balloons. As you breathe in and out, they get bigger and smaller to move air through your lungs. But with emphysema, these air sacs are damaged and lose their stretch. Less air gets in and out of the lungs. Many people with COPD have attacks called flare-ups or exacerbations. This is when your usual symptoms quickly get worse and stay worse. The doctor has checked you carefully. But problems can develop later. If you notice any problems or new symptoms, get medical treatment right away. Follow-up care is a key part of your treatment and safety. Be sure to make and go to all appointments, and call your doctor if you are having problems. It's also a good idea to know your test results and keep a list of the medicines you take. How can you care for yourself at home? · Be safe with medicines. Take your medicines exactly as prescribed. Call your doctor if you think you are having a problem with your medicine. You may be taking medicines such as: ¨ Bronchodilators. These help open your airways and make breathing easier. ¨ Corticosteroids. These reduce airway inflammation. They may be given as pills, in a vein, or in an inhaled form. You may go home with pills in addition to an inhaler that you already use. · A spacer may help you get more inhaled medicine to your lungs. Ask your doctor or pharmacist if a spacer is right for you. If it is, ask how to use it properly. · If your doctor prescribed antibiotics, take them as directed.  Do not stop taking them just because you feel better. You need to take the full course of antibiotics. · If your doctor prescribed oxygen, use the flow rate your doctor has recommended. Do not change it without talking to your doctor first. 
· Do not smoke. Smoking makes COPD worse. If you need help quitting, talk to your doctor about stop-smoking programs and medicines. These can increase your chances of quitting for good. When should you call for help? Call 911 anytime you think you may need emergency care. For example, call if: 
? · You have severe trouble breathing. ?Call your doctor now or seek immediate medical care if: 
? · You have new or worse trouble breathing. ? · Your coughing or wheezing gets worse. ? · You cough up dark brown or bloody mucus (sputum). ? · You have a new or higher fever. ? Watch closely for changes in your health, and be sure to contact your doctor if: 
? · You notice more mucus or a change in the color of your mucus. ? · You need to use your antibiotic or steroid pills. ? · You do not get better as expected. Where can you learn more? Go to http://sujata-cara.info/. Enter N344 in the search box to learn more about \"Chronic Obstructive Pulmonary Disease (COPD) Flare-Ups: Care Instructions. \" Current as of: May 12, 2017 Content Version: 11.4 © 4783-0074 [employer]wise, [employer]. Care instructions adapted under license by [employer] (which disclaims liability or warranty for this information). If you have questions about a medical condition or this instruction, always ask your healthcare professional. [place] [address] any warranty or liability for your use of this information. Please provide this summary of care documentation to your next provider. Signatures-by signing, you are acknowledging that this After Visit Summary has been reviewed with you and you have received a copy. Patient Signature:  ____________________________________________________________ Date:  ____________________________________________________________  
  
[place] Current Provider Signature:  ____________________________________________________________ Date:  ____________________________________________________________

## 2018-03-07 NOTE — ED NOTES
The Sepsis Screening has been completed on arrival in the Emergency Department.     Vital signs:  Patient Vitals for the past 4 hrs:   Temp Pulse Resp BP SpO2   03/07/18 1250 98.9 °F (37.2 °C) 99 21 145/79 (!) 83 %            =monitored (data validate)  MAP (Calculated): 101    =calculated (manual entry)    Is patient is 29y/o or older, meets 2 or more ABNORMAL VITAL SIGNS below, associated with SUSPECTED INFECTION?  YES     Temperature < 96.8°F (36°C) or > 100.9°F (38.3°C)   Heart Rate > 90 beats per minute   Respiratory Rate > 20 beats per minute   BP < 90 systolic    MAP < 65    IF ANSWER IS YES, CALL A CODE SEPSIS  POC LACTIC ACID ASAP AND BEGIN NURSE DRIVEN SEPSIS ORDERS WHILE AWAITING PROVIDER

## 2018-03-07 NOTE — PROGRESS NOTES
HISTORY OF PRESENT ILLNESS  Jovanny Malcolm is a 67 y.o. female. Patient presents with productive cough of yellowish phlegm, nasal drainage/congestion, generalized body aches, HA x 4 days with fever this AM, states she was so SOB on exertion last night walking to the bath room,states she has been taking Mucinex OTC with minimal relief. Patient denies any CP,dizziness, abd pain, NVD. Patient hypoxemic, hx of former smoker x 30 years and chronic bronchitis, uses Atrovent Inhaler, that has not been very helpful. Patient able to speak in full sentences,breathing spontaneous but mildly labored without use of accessory muscles, remains hypoxemic at 86-87% after continuous oxygen at 2 LNSC, 3 breathing treatments with hand held Nebulizer and duoneb, 125 mg of Solumedrol IM, discussed with collaborating Physician Dr Annel Cui who suggest referring the patient to the ED  Cold Symptoms   The history is provided by the patient. This is a new problem. Patient reports a subjective fever - was not measured. Associated symptoms include headaches, rhinorrhea, myalgias, shortness of breath and wheezing. Pertinent negatives include no chest pain, no chills, no sweats and no weight loss. Her past medical history is significant for bronchitis and COPD. Review of Systems   Constitutional: Negative. Negative for chills and weight loss. HENT: Positive for rhinorrhea. Respiratory: Positive for cough, sputum production, shortness of breath and wheezing. Cardiovascular: Negative for chest pain. Gastrointestinal: Negative. Genitourinary: Negative. Musculoskeletal: Positive for myalgias. Neurological: Positive for headaches. Psychiatric/Behavioral: Negative. Physical Exam   Constitutional: She is oriented to person, place, and time. She appears well-developed and well-nourished.    /75 (BP 1 Location: Left arm, BP Patient Position: Sitting)  Pulse 83  Temp 97.3 °F (36.3 °C) (Oral)   Resp 18  Ht 5' 8\" (1.727 m)  Wt 218 lb (98.9 kg)  SpO2 (!) 88%  BMI 33.15 kg/m2     HENT:   Head: Normocephalic and atraumatic. Nose: Mucosal edema and rhinorrhea present. Eyes: Conjunctivae and EOM are normal. Pupils are equal, round, and reactive to light. Neck: Normal range of motion. Cardiovascular: Normal rate. Pulmonary/Chest: Effort normal. She has wheezes in the right upper field and the right middle field. Abdominal: Soft. Bowel sounds are normal.   Musculoskeletal: Normal range of motion. Neurological: She is alert and oriented to person, place, and time. GCS eye subscore is 4. GCS verbal subscore is 5. GCS motor subscore is 6. Skin: Skin is warm and dry. Psychiatric: She has a normal mood and affect. Her behavior is normal. Judgment and thought content normal. Cognition and memory are normal.   Vitals reviewed. ASSESSMENT and PLAN    ICD-10-CM ICD-9-CM    1. Flu-like symptoms R68.89 780.99 albuterol-ipratropium (DUO-NEB) 2.5 mg-0.5 mg/3 ml nebu      AMB POC RAPID INFLUENZA TEST      oseltamivir (TAMIFLU) 75 mg capsule   2. Productive cough R05 786.2 albuterol-ipratropium (DUO-NEB) 2.5 mg-0.5 mg/3 ml nebu      AMB POC RAPID INFLUENZA TEST      XR CHEST PA LAT      methylPREDNISolone, PF, (SOLU-MEDROL) 125 mg/2 mL solr      METHYLPREDNISOLONE INJECTION      NH THER/PROPH/DIAG INJECTION, SUBCUT/IM      predniSONE (STERAPRED DS) 10 mg dose pack   3. Shortness of breath R06.02 786.05 albuterol-ipratropium (DUO-NEB) 2.5 mg-0.5 mg/3 ml nebu      AMB POC RAPID INFLUENZA TEST      XR CHEST PA LAT      methylPREDNISolone, PF, (SOLU-MEDROL) 125 mg/2 mL solr      NH THER/PROPH/DIAG INJECTION, SUBCUT/IM      predniSONE (STERAPRED DS) 10 mg dose pack      albuterol-ipratropium (DUO-NEB) 2.5 mg-0.5 mg/3 ml nebu      NONINVASV OXYGEN SATUT,CONTINUOUS   4.  Bronchospasm J98.01 519.11 albuterol-ipratropium (DUO-NEB) 2.5 mg-0.5 mg/3 ml nebu      AMB POC RAPID INFLUENZA TEST      methylPREDNISolone, PF, (SOLU-MEDROL) 125 mg/2 mL solr      ME THER/PROPH/DIAG INJECTION, SUBCUT/IM      predniSONE (STERAPRED DS) 10 mg dose pack      albuterol-ipratropium (DUO-NEB) 2.5 mg-0.5 mg/3 ml nebu   5. Hypoxemia R09.02 799.02 albuterol-ipratropium (DUO-NEB) 2.5 mg-0.5 mg/3 ml nebu      AMB POC RAPID INFLUENZA TEST      XR CHEST PA LAT      ME THER/PROPH/DIAG INJECTION, SUBCUT/IM      predniSONE (STERAPRED DS) 10 mg dose pack      albuterol-ipratropium (DUO-NEB) 2.5 mg-0.5 mg/3 ml nebu      NONINVASV OXYGEN SATUT,CONTINUOUS      REFERRAL TO EMERGENCY DEPARTMENT   6. Influenza J11.1 487.1 oseltamivir (TAMIFLU) 75 mg capsule      REFERRAL TO EMERGENCY DEPARTMENT     Encounter Diagnoses   Name Primary?     Flu-like symptoms Yes    Productive cough     Shortness of breath     Bronchospasm     Hypoxemia     Influenza      Orders Placed This Encounter    NONINVASV OXYGEN SATUT,CONTINUOUS    XR CHEST PA LAT    REFERRAL TO EMERGENCY DEPARTMENT    AMB POC RAPID INFLUENZA TEST    albuterol-ipratropium (DUO-NEB) 2.5 mg-0.5 mg/3 ml nebu    methylPREDNISolone, PF, (SOLU-MEDROL) 125 mg/2 mL solr    oseltamivir (TAMIFLU) 75 mg capsule    predniSONE (STERAPRED DS) 10 mg dose pack    albuterol-ipratropium (DUO-NEB) 2.5 mg-0.5 mg/3 ml nebu     Orders Placed This Encounter    NONINVASV OXYGEN SATUT,CONTINUOUS    XR CHEST PA LAT     Standing Status:   Future     Standing Expiration Date:   3/8/2019     Order Specific Question:   Reason for Exam     Answer:   SOB, hypoxemia,productive cough    REFERRAL TO EMERGENCY DEPARTMENT     Referral Priority:   Urgent     Referral Type:   Consultation     Referral Reason:   Specialty Services Required    AMB POC RAPID INFLUENZA TEST    METHYLPREDNISOLONE INJECTION (Qty 2)     Order Specific Question:   Dose     Answer:   125 mg     Order Specific Question:   Site     Answer:   RIGHT GLUTEUS     Order Specific Question:   Expiration Date     Answer:   1/31/2020     Order Specific Question: Lot#     Answer:   [id number]     Order Specific Question:        Answer:   [employer]     Order Specific Question:   Charge Quantity? Answer:   1     Order Specific Question:   Perfomed by/Witnessed by: Answer:   DAYANNA Holm LPN     Order Specific Question:   NDC#     Answer:   4334-9916-93    THER/PROPH/DIAG INJECTION, SUBCUT/IM    albuterol-ipratropium (DUO-NEB) 2.5 mg-0.5 mg/3 ml nebu     Sig: 3 mL by Nebulization route now for 1 dose. Dispense:  2 Nebule     Refill:  0     Order Specific Question:   Expiration Date     Answer:   2019     Order Specific Question:   Lot#     Answer:   [id number]     Order Specific Question:        Answer:   [facility] [facility]     Order Specific Question:   NDC#     Answer:   [id number]    methylPREDNISolone, PF, (SOLU-MEDROL) 125 mg/2 mL solr     Si mL by IntraVENous route once for 1 dose. Dispense:  1 Vial     Refill:  0    oseltamivir (TAMIFLU) 75 mg capsule     Sig: Take 1 Cap by mouth two (2) times a day for 5 days. Dispense:  10 Cap     Refill:  0    predniSONE (STERAPRED DS) 10 mg dose pack     Sig: See administration instruction per 10mg dose pack     Dispense:  21 Tab     Refill:  0    albuterol-ipratropium (DUO-NEB) 2.5 mg-0.5 mg/3 ml nebu     Sig: 3 mL by Nebulization route now for 1 dose. Dispense:  1 Nebule     Refill:  0     Orders Placed This Encounter    NONINVASV OXYGEN SATUT,CONTINUOUS    XR CHEST PA LAT    REFERRAL TO EMERGENCY DEPARTMENT    AMB POC RAPID INFLUENZA TEST    METHYLPREDNISOLONE INJECTION (Qty 2)    THER/PROPH/DIAG INJECTION, SUBCUT/IM    albuterol-ipratropium (DUO-NEB) 2.5 mg-0.5 mg/3 ml nebu    methylPREDNISolone, PF, (SOLU-MEDROL) 125 mg/2 mL solr    oseltamivir (TAMIFLU) 75 mg capsule    predniSONE (STERAPRED DS) 10 mg dose pack    albuterol-ipratropium (DUO-NEB) 2.5 mg-0.5 mg/3 ml nebu     Diagnoses and all orders for this visit:    1.  Flu-like symptoms  - albuterol-ipratropium (DUO-NEB) 2.5 mg-0.5 mg/3 ml nebu; 3 mL by Nebulization route now for 1 dose. -     AMB POC RAPID INFLUENZA TEST  -     oseltamivir (TAMIFLU) 75 mg capsule; Take 1 Cap by mouth two (2) times a day for 5 days. 2. Productive cough  -     albuterol-ipratropium (DUO-NEB) 2.5 mg-0.5 mg/3 ml nebu; 3 mL by Nebulization route now for 1 dose. -     AMB POC RAPID INFLUENZA TEST  -     XR CHEST PA LAT; Future  -     methylPREDNISolone, PF, (SOLU-MEDROL) 125 mg/2 mL solr; 2 mL by IntraVENous route once for 1 dose. -     METHYLPREDNISOLONE INJECTION (Qty 2)  -     THER/PROPH/DIAG INJECTION, SUBCUT/IM  -     predniSONE (STERAPRED DS) 10 mg dose pack; See administration instruction per 10mg dose pack    3. Shortness of breath  -     albuterol-ipratropium (DUO-NEB) 2.5 mg-0.5 mg/3 ml nebu; 3 mL by Nebulization route now for 1 dose. -     AMB POC RAPID INFLUENZA TEST  -     XR CHEST PA LAT; Future  -     methylPREDNISolone, PF, (SOLU-MEDROL) 125 mg/2 mL solr; 2 mL by IntraVENous route once for 1 dose. -     THER/PROPH/DIAG INJECTION, SUBCUT/IM  -     predniSONE (STERAPRED DS) 10 mg dose pack; See administration instruction per 10mg dose pack  -     albuterol-ipratropium (DUO-NEB) 2.5 mg-0.5 mg/3 ml nebu; 3 mL by Nebulization route now for 1 dose.  -     NONINVASV OXYGEN SATUT,CONTINUOUS    4. Bronchospasm  -     albuterol-ipratropium (DUO-NEB) 2.5 mg-0.5 mg/3 ml nebu; 3 mL by Nebulization route now for 1 dose. -     AMB POC RAPID INFLUENZA TEST  -     methylPREDNISolone, PF, (SOLU-MEDROL) 125 mg/2 mL solr; 2 mL by IntraVENous route once for 1 dose. -     THER/PROPH/DIAG INJECTION, SUBCUT/IM  -     predniSONE (STERAPRED DS) 10 mg dose pack; See administration instruction per 10mg dose pack  -     albuterol-ipratropium (DUO-NEB) 2.5 mg-0.5 mg/3 ml nebu; 3 mL by Nebulization route now for 1 dose.     5. Hypoxemia  -     albuterol-ipratropium (DUO-NEB) 2.5 mg-0.5 mg/3 ml nebu; 3 mL by Nebulization route now for 1 dose. -     AMB POC RAPID INFLUENZA TEST  -     XR CHEST PA LAT; Future  -     THER/PROPH/DIAG INJECTION, SUBCUT/IM  -     predniSONE (STERAPRED DS) 10 mg dose pack; See administration instruction per 10mg dose pack  -     albuterol-ipratropium (DUO-NEB) 2.5 mg-0.5 mg/3 ml nebu; 3 mL by Nebulization route now for 1 dose.  -     NONINVASV OXYGEN SATUT,CONTINUOUS  -     REFERRAL TO EMERGENCY DEPARTMENT    6. Influenza  -     oseltamivir (TAMIFLU) 75 mg capsule; Take 1 Cap by mouth two (2) times a day for 5 days.  -     REFERRAL TO EMERGENCY DEPARTMENT      Follow-up Disposition:  Return in about 1 week (around 3/14/2018).   current treatment plan is effective, no change in therapy  the following changes in treatment are made:Patient able to speak in full sentences,breathing spontaneous but mildly labored without use of accessory muscles, remains hypoxemic at 86-87% after continuous oxygen at 2 LNSC, 3 breathing treatments with hand held Nebulizer and duoneb, 125 mg of Solumedrol IM, discussed with collaborating Physician Dr Lawayne Aschoff who suggest referring the patient to the ED  Referral to ED, patient declined emergent transportation to the ED

## 2018-03-07 NOTE — MR AVS SNAPSHOT
[address] [facility], operated by [facility] 
 
 
 [address] [id number] 
[phone] Patient: Olga Kent MRN: [id number]:[id number] Visit Information Date & Time Provider Department Dept. Phone Encounter #  
 3/7/2018 10:00 AM Marcelina Aldana NP [facility] [phone] [id number] Follow-up Instructions Return in about 1 week (around 3/14/2018). Your Appointments 7/2/2018  9:40 AM  
[place] [address] [address]) Appt Note: Return in about 5 months (around 6/30/2018). [address] [facility] [address] [place]  
  
   
 [address] [facility] [address] [place] [address] [address] Upcoming Health Maintenance Date Due  
 GLAUCOMA SCREENING Q2Y 7/7/2010 MEDICARE YEARLY EXAM 6/28/2018 BREAST CANCER SCRN MAMMOGRAM 6/12/2019 DTaP/Tdap/Td series (2 - Td) 6/23/2025 COLONOSCOPY 4/20/2026 Allergies as of 3/7/2018  Review Complete On: 1/30/2018 By: Poppy Villarreal DO No Known Allergies Current Immunizations  Reviewed on 6/27/2017 Name Date Influenza High Dose Vaccine PF 9/27/2017 Pneumococcal Conjugate (PCV-13) 11/15/2016 Pneumococcal Polysaccharide (PPSV-23) 10/15/2012 Tdap 6/23/2015 Not reviewed this visit You Were Diagnosed With   
  
 Codes Comments Flu-like symptoms    -  Primary ICD-10-CM: R68.89 ICD-9-CM: 780.99 Productive cough     ICD-10-CM: R05 ICD-9-CM: 786.2 Shortness of breath     ICD-10-CM: R06.02 
ICD-9-CM: 786.05 Bronchospasm     ICD-10-CM: J98.01 
ICD-9-CM: 519.11 Hypoxemia     ICD-10-CM: R09.02 
ICD-9-CM: 799.02 Influenza     ICD-10-CM: J11.1 ICD-9-CM: 487. 1 Vitals BP Pulse Temp Resp Height(growth percentile) Weight(growth percentile)  148/75 (BP 1 Location: Left arm, BP Patient Position: Sitting) 83 97.3 °F (36.3 °C) (Oral) 22 5' 8\" (1.727 m) 218 lb (98.9 kg) SpO2 BMI OB Status Smoking Status (!) 87% 33.15 kg/m2 Postmenopausal Former Smoker Vitals History BMI and BSA Data Body Mass Index Body Surface Area  
 33.15 kg/m 2 2.18 m 2 Preferred Pharmacy Pharmacy Name Phone [id number] [address] [facility] [phone] Your Updated Medication List  
  
   
This list is accurate as of 3/7/18 12:13 PM.  Always use your most recent med list.  
  
  
  
  
 * albuterol-ipratropium 2.5 mg-0.5 mg/3 ml Nebu Commonly known as:  DUO-NEB  
3 mL by Nebulization route now for 1 dose. * albuterol-ipratropium 2.5 mg-0.5 mg/3 ml Nebu Commonly known as:  DUO-NEB  
3 mL by Nebulization route now for 1 dose. aspirin delayed-release 81 mg tablet Take  by mouth daily. * ipratropium 17 mcg/actuation inhaler Commonly known as:  ATROVENT HFA Take 1 Puff by inhalation every four (4) hours as needed for Wheezing. * ATROVENT HFA 17 mcg/actuation inhaler Generic drug:  ipratropium TAKE 1 PUFF BY INHALATION EVERY 4 HOURS AS NEEDED FOR WHEEZING  
  
 calcium-cholecalciferol (D3) tablet Commonly known as:  CALTRATE 600+D Take 1 tablet by mouth daily. FISH OIL 1,000 mg Cap Generic drug:  omega-3 fatty acids-vitamin e Take 1 capsule by mouth.  
  
 levothyroxine 50 mcg tablet Commonly known as:  SYNTHROID Take 1 Tab by mouth Daily (before breakfast). methylPREDNISolone (PF) 125 mg/2 mL Solr Commonly known as:  SOLU-MEDROL 2 mL by IntraVENous route once for 1 dose. oseltamivir 75 mg capsule Commonly known as:  TAMIFLU Take 1 Cap by mouth two (2) times a day for 5 days. predniSONE 10 mg dose pack Commonly known as:  STERAPRED DS See administration instruction per 10mg dose pack * Notice:   This list has 4 medication(s) that are the same as other medications prescribed for you. Read the directions carefully, and ask your doctor or other care provider to review them with you. Prescriptions Sent to Pharmacy Refills  
 oseltamivir (TAMIFLU) 75 mg capsule 0 Sig: Take 1 Cap by mouth two (2) times a day for 5 days. Class: Normal  
 [facility]: [facility] [place]  #: [phone] Route: Oral  
 predniSONE (STERAPRED DS) 10 mg dose pack 0 Sig: See administration instruction per 10mg dose pack Class: Normal  
 [facility]acy[facility] [place]  #: [phone] We Performed the Following AMB POC RAPID INFLUENZA TEST [42247 CPT(R)] METHYLPREDNISOLONE INJECTION [ hospitals] NONINVASV OXYGEN SATUT,CONTINUOUS [id number] CPT(R)] DC THER/PROPH/DIAG INJECTION, SUBCUT/IM [id number] CPT(R)] REFERRAL TO EMERGENCY DEPARTMENT [BMJ532 Custom] Comments:  
 Please evaluate patient for SOB, Hypoxemia. Follow-up Instructions Return in about 1 week (around 3/14/2018). To-Do List   
 03/07/2018 Imaging:  XR CHEST PA LAT Referral Information Referral ID Referred By Referred To  
  
 [id number] MARY VAUGHN Not Available Visits Status Start Date End Date 1 New Request 3/7/18 3/7/19 If your referral has a status of pending review or denied, additional information will be sent to support the outcome of this decision. Patient Instructions Influenza (Flu): Care Instructions Your Care Instructions Influenza (flu) is an infection in the lungs and breathing passages. It is caused by the influenza virus. There are different strains, or types, of the flu virus from year to year. Unlike the common cold, the flu comes on suddenly and the symptoms, such as a cough, congestion, fever, chills, fatigue, aches, and pains, are more severe.  These symptoms may last up to 10 days. Although the flu can make you feel very sick, it usually doesn't cause serious health problems. Home treatment is usually all you need for flu symptoms. But your doctor may prescribe antiviral medicine to prevent other health problems, such as pneumonia, from developing. Older people and those who have a long-term health condition, such as lung disease, are most at risk for having pneumonia or other health problems. Follow-up care is a key part of your treatment and safety. Be sure to make and go to all appointments, and call your doctor if you are having problems. It's also a good idea to know your test results and keep a list of the medicines you take. How can you care for yourself at home? · Get plenty of rest. 
· Drink plenty of fluids, enough so that your urine is light yellow or clear like water. If you have kidney, heart, or liver disease and have to limit fluids, talk with your doctor before you increase the amount of fluids you drink. · Take an over-the-counter pain medicine if needed, such as acetaminophen (Tylenol), ibuprofen (Advil, Motrin), or naproxen (Aleve), to relieve fever, headache, and muscle aches. Read and follow all instructions on the label. No one younger than 20 should take aspirin. It has been linked to Reye syndrome, a serious illness. · Do not smoke. Smoking can make the flu worse. If you need help quitting, talk to your doctor about stop-smoking programs and medicines. These can increase your chances of quitting for good. · Breathe moist air from a hot shower or from a sink filled with hot water to help clear a stuffy nose. · Before you use cough and cold medicines, check the label. These medicines may not be safe for young children or for people with certain health problems. · If the skin around your nose and lips becomes sore, put some petroleum jelly on the area. · To ease coughing: ¨ Drink fluids to soothe a scratchy throat. ¨ Suck on cough drops or plain hard candy. ¨ Take an over-the-counter cough medicine that contains dextromethorphan to help you get some sleep. Read and follow all instructions on the label. ¨ Raise your head at night with an extra pillow. This may help you rest if coughing keeps you awake. · Take any prescribed medicine exactly as directed. Call your doctor if you think you are having a problem with your medicine. To avoid spreading the flu · Wash your hands regularly, and keep your hands away from your face. · Stay home from school, work, and other public places until you are feeling better and your fever has been gone for at least 24 hours. The fever needs to have gone away on its own without the help of medicine. · Ask people living with you to talk to their doctors about preventing the flu. They may get antiviral medicine to keep from getting the flu from you. · To prevent the flu in the future, get a flu vaccine every fall. Encourage people living with you to get the vaccine. · Cover your mouth when you cough or sneeze. When should you call for help? Call 911 anytime you think you may need emergency care. For example, call if: 
? · You have severe trouble breathing. ?Call your doctor now or seek immediate medical care if: 
? · You have new or worse trouble breathing. ? · You seem to be getting much sicker. ? · You feel very sleepy or confused. ? · You have a new or higher fever. ? · You get a new rash. ? Watch closely for changes in your health, and be sure to contact your doctor if: 
? · You begin to get better and then get worse. ? · You are not getting better after 1 week. Where can you learn more? Go to http://sujata-cara.info/. Enter D480 in the search box to learn more about \"Influenza (Flu): Care Instructions. \" Current as of: May 12, 2017 Content Version: 11.4 © 3946-1359 Healthwise, Incorporated.  Care instructions adapted under license by [address] (which disclaims liability or warranty for this information). If you have questions about a medical condition or this instruction, always ask your healthcare professional. [address] any warranty or liability for your use of this information. Wheezing or Bronchoconstriction: Care Instructions Your Care Instructions Wheezing is a whistling noise made during breathing. It occurs when the small airways, or bronchial tubes, that lead to your lungs swell or contract (spasm) and become narrow. This narrowing is called bronchoconstriction. When your airways constrict, it is hard for air to pass through and this makes it hard for you to breathe. Wheezing and bronchoconstriction can be caused by many problems, including: · An infection such as the flu or a cold. · Allergies such as hay fever. · Diseases such as asthma or chronic obstructive pulmonary disease. · Smoking. Treatment for your wheezing depends on what is causing the problem. Your wheezing may get better without treatment. But you may need to pay attention to things that cause your wheezing and avoid them. Or you may need medicine to help treat the wheezing and to reduce the swelling or to relieve spasms in your lungs. Follow-up care is a key part of your treatment and safety. Be sure to make and go to all appointments, and call your doctor if you are having problems. It is also a good idea to know your test results and keep a list of the medicines you take. How can you care for yourself at home? · Take your medicine exactly as prescribed. Call your doctor if you think you are having a problem with your medicine. You will get more details on the specific medicine your doctor prescribes. · If your doctor prescribed antibiotics, take them as directed. Do not stop taking them just because you feel better. You need to take the full course of antibiotics. · Breathe moist air from a humidifier, hot shower, or sink filled with hot water. This may help ease your symptoms and make it easier for you to breathe. · If you have congestion in your nose and throat, drinking plenty of fluids, especially hot fluids, may help relieve your symptoms. If you have kidney, heart, or liver disease and have to limit fluids, talk with your doctor before you increase the amount of fluids you drink. · If you have mucus in your airways, it may help to breathe deeply and cough. · Do not smoke or allow others to smoke around you. Smoking can make your wheezing worse. If you need help quitting, talk to your doctor about stop-smoking programs and medicines. These can increase your chances of quitting for good. · Avoid things that may cause your wheezing. These may include colds, smoke, air pollution, dust, pollen, pets, cockroaches, stress, and cold air. When should you call for help? Call 911 anytime you think you may need emergency care. For example, call if: 
? · You have severe trouble breathing. ? · You passed out (lost consciousness). ?Call your doctor now or seek immediate medical care if: 
? · You cough up yellow, dark brown, or bloody mucus (sputum). ? · You have new or worse shortness of breath. ? · Your wheezing is not getting better or it gets worse after you start taking your medicine. ? Watch closely for changes in your health, and be sure to contact your doctor if: 
? · You do not get better as expected. Where can you learn more? Go to http://sujata-cara.info/. Enter 454 5344 in the search box to learn more about \"Wheezing or Bronchoconstriction: Care Instructions. \" Current as of: May 12, 2017 Content Version: 11.4 © 6113-1312 [employer]. Care instructions adapted under license by [employer] (which disclaims liability or warranty for this information).  If you have questions about a medical condition or this instruction, always ask your healthcare professional. [address] any warranty or liability for your use of this information. Learning About Chronic Bronchitis What is chronic bronchitis? Chronic bronchitis is long-term swelling and the buildup of mucus in the airways of your lungs. The airways (bronchial tubes) get inflamed and make a lot of mucus. This can narrow or block the airways, making it hard for you to breathe. It is a form of COPD (chronic obstructive pulmonary disease). Chronic bronchitis is usually caused by smoking. But chemical fumes, dust, or air pollution also can cause it over time. What can you expect when you have chronic bronchitis? Chronic bronchitis gets worse over time. You cannot undo the damage to your lungs. Over time, you may find that: 
· You get short of breath even when you do simple things like get dressed or fix a meal. 
· It is hard to eat or exercise. · You lose weight and feel weaker. Over many years, the swelling and mucus from chronic bronchitis make it more likely that you will get lung infections. But there are things you can do to prevent more damage and feel better. What are the symptoms? The main symptoms of chronic bronchitis are: · A cough that will not go away. · Mucus that comes up when you cough. · Shortness of breath that gets worse when you exercise. At times, your symptoms may suddenly flare up and get much worse. This is a called an exacerbation (say \"egg-ZARICH-er-BAY-shun\"). When this happens, your usual symptoms quickly get worse and stay bad. This can be dangerous. You may have to go to the hospital. 
How can you keep chronic bronchitis from getting worse? Don't smoke. That is the best way to keep chronic bronchitis from getting worse. If you already smoke, it is never too late to stop.  If you need help quitting, talk to your doctor about stop-smoking programs and medicines. These can increase your chances of quitting for good. You can do other things to keep chronic bronchitis from getting worse: · Avoid bad air. Air pollution, chemical fumes, and dust also can make chronic bronchitis worse. · Get a flu shot every year. A shot may keep the flu from turning into something more serious, like pneumonia. A flu shot also may lower your chances of having a flare-up. · Get a pneumococcal shot. A shot can prevent some of the serious complications of pneumonia. Ask your doctor how often you should get this shot. How is chronic bronchitis treated? Chronic bronchitis is treated with medicines and oxygen. You also can take steps at home to stay healthy and keep your condition from getting worse. Medicines and oxygen therapy · You may be taking medicines such as: ¨ Bronchodilators. These help open your airways and make breathing easier. Bronchodilators are either short-acting (work for 6 to 9 hours) or long-acting (work for 24 hours). You inhale most bronchodilators, so they start to act quickly. Always carry your quick-relief inhaler with you in case you need it while you are away from home. ¨ Corticosteroids. These reduce airway inflammation. They come in pill or inhaled form. You must take these medicines every day for them to work well. ¨ Antibiotics. These medicines are used when you have a bacterial lung infection. · Take your medicines exactly as prescribed. Call your doctor if you think you are having a problem with your medicine. · Oxygen therapy boosts the amount of oxygen in your blood and helps you breathe easier. Use the flow rate your doctor has recommended, and do not change it without talking to your doctor first. 
Other care at home · If your doctor recommends it, get more exercise. Walking is a good choice. Bit by bit, increase the amount you walk every day. Try for at least 30 minutes on most days of the week. · Learn breathing methods-such as breathing through pursed lips-to help you become less short of breath. · If your doctor has not set you up with a pulmonary rehabilitation program, talk to him or her about whether rehab is right for you. Rehab includes exercise programs, education about your disease and how to manage it, help with diet and other changes, and emotional support. · Eat regular, healthy meals. Use bronchodilators about 1 hour before you eat to make it easier to eat. Eat several small meals instead of three large ones. Drink beverages at the end of the meal. Avoid foods that are hard to chew. Follow-up care is a key part of your treatment and safety. Be sure to make and go to all appointments, and call your doctor if you are having problems. It's also a good idea to know your test results and keep a list of the medicines you take. Where can you learn more? Go to http://sujata-cara.info/. Enter E229 in the search box to learn more about \"Learning About Chronic Bronchitis. \" Current as of: May 12, 2017 Content Version: 11.4 © 6586-7424 i-dispo.com. Care instructions adapted under license by [employer] (which disclaims liability or warranty for this information). If you have questions about a medical condition or this instruction, always ask your healthcare professional. [address] any warranty or liability for your use of this information. Learning About Hypoxemia What is hypoxemia? Hypoxemia means that you don't have enough oxygen in your blood. It's a result of diseases that affect your heart or lungs. These include heart failure, COPD, and pulmonary fibrosis (scarring of the lungs). Being at high altitudes can also lead to hypoxemia. What happens when you have hypoxemia? Oxygen gets into your blood through your lungs. Your blood carries the oxygen to all parts of your body.  When you have too little oxygen in your blood, your body doesn't get enough of it. With too little oxygen, your heart and other parts of your body don't work very well. What are the symptoms? In addition to the symptoms of whatever is causing your hypoxemia, you may: · Get tired quickly. · Be short of breath when you are active. · Feel like your heart is pounding or racing. · Feel weak or dizzy. · Become confused. How is hypoxemia treated? Your doctor will do tests to find out how much oxygen is in your blood. He or she will look for the cause of your hypoxemia and treat that problem. For example, if you have heart failure, you may need medicines that help your heart pump better. · If your hypoxemia is not severe, your doctor may give you oxygen through a mask or nasal cannula (say \"JUVENAL-rebeca-vy\"). A cannula is a thin tube with two openings that fit just inside your nose. · If your hypoxemia is severe, you may have a breathing tube put into your windpipe. The breathing tube is attached to a machine that pushes air into your lungs. This machine is called a ventilator. · If you have a long-term problem with hypoxemia, your doctor may recommend that you use oxygen regularly. Some people need it all the time. Others need it from time to time throughout the day or overnight. Your doctor will tell you how much oxygen you need and how often to use it. Follow-up care is a key part of your treatment and safety. Be sure to make and go to all appointments, and call your doctor if you are having problems. It's also a good idea to know your test results and keep a list of the medicines you take. Where can you learn more? Go to http://sujata-cara.info/. Enter M375 in the search box to learn more about \"Learning About Hypoxemia. \" Current as of: May 12, 2017 Content Version: 11.4 © 9336-7205 Healthwise, Incorporated.  Care instructions adapted under license by [employer] (which disclaims liability or warranty for this information). If you have questions about a medical condition or this instruction, always ask your healthcare professional. [address] any warranty or liability for your use of this information. Introducing [facility] SERVICES! Dear Gabrielle Mello: Thank you for requesting a eShares account. Our records indicate that you already have an active eShares account. You can access your account anytime at [email or web address] Did you know that you can access your hospital and ER discharge instructions at any time in eShares? You can also review all of your test results from your hospital stay or ER visit. Additional Information If you have questions, please visit the Frequently Asked Questions section of the eShares website at [email or web address] Remember, eShares is NOT to be used for urgent needs. For medical emergencies, dial 911. Now available from your iPhone and Android! Please provide this summary of care documentation to your next provider. Your primary care clinician is listed as Gloria Milner. If you have any questions after today's visit, please call [phone].

## 2018-03-07 NOTE — ED NOTES
I performed a brief evaluation, including history and physical, of the patient here in triage and I have determined that pt will need further treatment and evaluation from the main side ER physician. I have placed initial orders to help in expediting patients care.      March 07, 2018 at 12:57 PM - RIKKI Steinberg        Visit Vitals    /79    Pulse 99    Temp 98.9 °F (37.2 °C)    Resp 21    Ht 5' 8\" (1.727 m)    Wt 98 kg (216 lb)    SpO2 (!) 83%    BMI 32.84 kg/m2

## 2018-03-07 NOTE — IP AVS SNAPSHOT
[address] [address] 
[phone] Patient: Krystal Signs MRN: [id number] AV:5/8/[id number] A check vamsi indicates which time of day the medication should be taken. My Medications START taking these medications Instructions Each Dose to Equal  
 Morning Noon Evening Bedtime  
 azithromycin 250 mg tablet Commonly known as:  Kalin Wiley Your last dose was: Your next dose is: Take 1 Tab by mouth daily for 3 days. 250 mg CONTINUE taking these medications Instructions Each Dose to Equal  
 Morning Noon Evening Bedtime  
 aspirin delayed-release 81 mg tablet Your last dose was: Your next dose is: Take  by mouth daily. * ipratropium 17 mcg/actuation inhaler Commonly known as:  ATROVENT HFA Your last dose was: Your next dose is: Take 1 Puff by inhalation every four (4) hours as needed for Wheezing. 1 Puff * ATROVENT HFA 17 mcg/actuation inhaler Generic drug:  ipratropium Your last dose was: Your next dose is: TAKE 1 PUFF BY INHALATION EVERY 4 HOURS AS NEEDED FOR WHEEZING  
     
   
   
   
  
 calcium-cholecalciferol (D3) tablet Commonly known as:  CALTRATE 600+D Your last dose was: Your next dose is: Take 1 tablet by mouth daily. 1 Tab FISH OIL 1,000 mg Cap Generic drug:  omega-3 fatty acids-vitamin e Your last dose was: Your next dose is: Take 1 capsule by mouth. 1 Cap  
    
   
   
   
  
 levothyroxine 50 mcg tablet Commonly known as:  SYNTHROID Your last dose was: Your next dose is: Take 1 Tab by mouth Daily (before breakfast). 50 mcg  
    
   
   
   
  
 oseltamivir 75 mg capsule Commonly known as:  TAMIFLU Your last dose was: Your next dose is: Take 1 Cap by mouth two (2) times a day for 5 days. 75 mg  
    
   
   
   
  
 predniSONE 10 mg dose pack Commonly known as:  STERAPRED DS Your last dose was: Your next dose is:    
   
   
 See administration instruction per 10mg dose pack * Notice: This list has 2 medication(s) that are the same as other medications prescribed for you. Read the directions carefully, and ask your doctor or other care provider to review them with you. STOP taking these medications   
 albuterol-ipratropium 2.5 mg-0.5 mg/3 ml Nebu Commonly known as:  DUO-NEB  
   
  
 methylPREDNISolone (PF) 125 mg/2 mL Solr Commonly known as:  SOLU-MEDROL Where to Get Your Medications These medications were sent to [address] [place] [id number] Phone:  [phone]  
  azithromycin 250 mg tablet

## 2018-03-07 NOTE — ED TRIAGE NOTES
\"I have the flu and bronchitis. I was seen at the [facility] on [address] [facility]. They said my oxygen was low so he sent me here. \"

## 2018-03-07 NOTE — H&P
GENERAL GENERIC H&P/CONSULT    Eriberto Welch is a 67 y.o. female with hashimoto's thyroiditis, chronic bronchitis who presents to the ED x1 week of SOB, nasal/congestion, temp of 99.8, and productive cough (greenish/yellow), myalgia, and Headache along with BELL. Patient had minimal relief with Mucinex OTC. Patient is a Former tobacco user x30 pack yr hx with noting chronic bronchitis using atovent however not helpful. Patient initially presented to urgent care receiving nebs along with steriods 125mg solumedrol, and 02 however still hypoxic at 89% also noting to be flu positive. With these finding patient was recomemdned to come to ED. Patient denies cp, n/v, diarrhea, or any other acute ocmplaitns at ths time. Patient will be admtted for further eval.    Past Medical History:   Diagnosis Date    Acute bronchitis 12/28/2015    Advance directive discussed with patient 5/4/2016    Pt would like to appoint her son, Ang Lau as her medical decision maker in the event that she can no longer do so. Phone number is [phone]. Her secondary person is her next door etrigg. Phone number is [phone]. If her death is imminent and medical treatment will not help her recover, pt does want life prolonging measures.   If her condition makes her unawar    Bilateral leg pain 10/22/2015    Elevated TSH 10/22/2015    Hashimoto's thyroiditis 12/28/2015    Hernia, abdominal     History of benign breast tumor     Menopause     Nicotine dependence in remission 2/4/2016    Obesity, Class I, BMI 30-34.9 2/4/2016    Prediabetes 10/22/2015    Simple chronic bronchitis ([address] 5/4/2016    Skin lesion 2/4/2016    Varicose vein of leg       Past Surgical History:   Procedure Laterality Date    HX BREAST BIOPSY Right     Milk ducts removed    HX CYST INCISION AND DRAINAGE Right     37 y/o    HX HERNIA REPAIR      abdominal x2    HX HERNIA REPAIR  09/15/2016    ROBOTIC REPAIR OF RECURRENT INCARCERATED HERNIA WITH EXTENSIVE LYSIS OF ADHESIONS WITH PLACEMENT OF MESH      Prior to Admission medications    Medication Sig Start Date End Date Taking? Authorizing Provider   albuterol-ipratropium (DUO-NEB) 2.5 mg-0.5 mg/3 ml nebu 3 mL by Nebulization route now for 1 dose. 3/7/18 3/7/18  Chu Blanco NP   methylPREDNISolone, PF, (SOLU-MEDROL) 125 mg/2 mL solr 2 mL by IntraVENous route once for 1 dose. 3/7/18 3/7/18  Chu Blanco NP   oseltamivir (TAMIFLU) 75 mg capsule Take 1 Cap by mouth two (2) times a day for 5 days. 3/7/18 3/12/18  Chu Blanco NP   predniSONE (STERAPRED DS) 10 mg dose pack See administration instruction per 10mg dose pack 3/7/18   Ryan Joyner NP   albuterol-ipratropium (DUO-NEB) 2.5 mg-0.5 mg/3 ml nebu 3 mL by Nebulization route now for 1 dose. 3/7/18 3/7/18  Chu Blanco NP   levothyroxine (SYNTHROID) 50 mcg tablet Take 1 Tab by mouth Daily (before breakfast). 1/30/18   Ernesto Uriostegui, DO   ATROVENT HFA 17 mcg/actuation inhaler  TAKE 1 PUFF BY INHALATION EVERY 4 HOURS AS NEEDED FOR WHEEZING 8/13/17   Ernesto Uriostegui, DO   ipratropium (ATROVENT HFA) 17 mcg/actuation inhaler Take 1 Puff by inhalation every four (4) hours as needed for Wheezing. 8/11/17   Ernesto Uriostegui, DO   omega-3 fatty acids-vitamin e (FISH OIL) 1,000 mg cap Take 1 capsule by mouth. Historical Provider   calcium-cholecalciferol, D3, (CALTRATE 600+D) tablet Take 1 tablet by mouth daily. Historical Provider   aspirin delayed-release 81 mg tablet Take  by mouth daily.     Historical Provider     No Known Allergies   Social History   Substance Use Topics    Smoking status: Former Smoker     Packs/day: 1.00     Years: 30.00     Quit date: 1/1/1992    Smokeless tobacco: Never Used    Alcohol use 1.2 oz/week     2 Glasses of wine per week      Comment: Occasionally       Family History   Problem Relation Age of Onset    Breast Cancer Mother 38     36s    Lung Disease Mother     Elevated Lipids Mother  Stroke Father     Diabetes Paternal Grandfather       Review of Systems   Constitutional: Negative. HENT: Negative for congestion and ear discharge. Eyes: Negative. Respiratory: Positive for cough, shortness of breath and wheezing. Negative for apnea. Cardiovascular: Negative. Gastrointestinal: Negative. Endocrine: Negative. Negative for cold intolerance and polyphagia. Genitourinary: Negative. Musculoskeletal: Negative. Neurological: Negative. Hematological: Negative. Objective:          Patient Vitals for the past 8 hrs:   BP Temp Pulse Resp SpO2 Height Weight   03/07/18 1415 135/58 - 83 - 91 % - -   03/07/18 1411 - - 84 - 93 % - -   03/07/18 1407 137/65 - - - 92 % - -   03/07/18 1250 145/79 98.9 °F (37.2 °C) 99 21 (!) 83 % 5' 8\" (1.727 m) 98 kg (216 lb)     Physical Exam   Constitutional: She is oriented to person, place, and time. No distress. HENT:   Head: Normocephalic. Eyes: Pupils are equal, round, and reactive to light. Neck: Normal range of motion. Pulmonary/Chest: She is in respiratory distress. She has wheezes. Abdominal: Soft. Bowel sounds are normal.   Musculoskeletal: Normal range of motion. Neurological: She is alert and oriented to person, place, and time. Skin: Skin is warm and dry. She is not diaphoretic.         Labs:    Recent Results (from the past 24 hour(s))   AMB POC RAPID INFLUENZA TEST    Collection Time: 03/07/18 10:45 AM   Result Value Ref Range    VALID INTERNAL CONTROL POC Yes     QuickVue Influenza test Positive Negative   CULTURE, BLOOD    Collection Time: 03/07/18  1:25 PM   Result Value Ref Range    Special Requests: PERIPHERAL      Culture result: PENDING    METABOLIC PANEL, COMPREHENSIVE    Collection Time: 03/07/18  1:45 PM   Result Value Ref Range    Sodium 140 136 - 145 mmol/L    Potassium 3.9 3.5 - 5.5 mmol/L    Chloride 103 100 - 108 mmol/L    CO2 31 21 - 32 mmol/L    Anion gap 6 3.0 - 18 mmol/L    Glucose 130 (H) 74 - 99 mg/dL    BUN 16 7.0 - 18 MG/DL    Creatinine 0.90 0.6 - 1.3 MG/DL    BUN/Creatinine ratio 18 12 - 20      GFR est AA >60 >60 ml/min/1.73m2    GFR est non-AA >60 >60 ml/min/1.73m2    Calcium 8.4 (L) 8.5 - 10.1 MG/DL    Bilirubin, total 0.3 0.2 - 1.0 MG/DL    ALT (SGPT) 17 13 - 56 U/L    AST (SGOT) 18 15 - 37 U/L    Alk. phosphatase 98 45 - 117 U/L    Protein, total 8.5 (H) 6.4 - 8.2 g/dL    Albumin 3.9 3.4 - 5.0 g/dL    Globulin 4.6 (H) 2.0 - 4.0 g/dL    A-G Ratio 0.8 0.8 - 1.7     CBC WITH AUTOMATED DIFF    Collection Time: 03/07/18  1:45 PM   Result Value Ref Range    WBC 6.7 4.6 - 13.2 K/uL    RBC 4.45 4.20 - 5.30 M/uL    HGB 14.1 12.0 - 16.0 g/dL    HCT 42.6 35.0 - 45.0 %    MCV 95.7 74.0 - 97.0 FL    MCH 31.7 24.0 - 34.0 PG    MCHC 33.1 31.0 - 37.0 g/dL    RDW 13.1 11.6 - 14.5 %    PLATELET 882 581 - 520 K/uL    MPV 10.2 9.2 - 11.8 FL    NEUTROPHILS 92 (H) 40 - 73 %    LYMPHOCYTES 6 (L) 21 - 52 %    MONOCYTES 2 (L) 3 - 10 %    EOSINOPHILS 0 0 - 5 %    BASOPHILS 0 0 - 2 %    ABS. NEUTROPHILS 6.2 1.8 - 8.0 K/UL    ABS. LYMPHOCYTES 0.4 (L) 0.9 - 3.6 K/UL    ABS. MONOCYTES 0.1 0.05 - 1.2 K/UL    ABS. EOSINOPHILS 0.0 0.0 - 0.4 K/UL    ABS.  BASOPHILS 0.0 0.0 - 0.06 K/UL    DF AUTOMATED     NT-PRO BNP    Collection Time: 03/07/18  1:45 PM   Result Value Ref Range    NT pro- 0 - 900 PG/ML   CARDIAC PANEL,(CK, CKMB & TROPONIN)    Collection Time: 03/07/18  1:45 PM   Result Value Ref Range    CK 74 26 - 192 U/L    CK - MB 1.0 <3.6 ng/ml    CK-MB Index 1.4 0.0 - 4.0 %    Troponin-I, Qt. <0.02 0.0 - 0.045 NG/ML   LACTIC ACID    Collection Time: 03/07/18  1:50 PM   Result Value Ref Range    Lactic acid 3.4 (HH) 0.4 - 2.0 MMOL/L   POC LACTIC ACID    Collection Time: 03/07/18  2:00 PM   Result Value Ref Range    Lactic Acid (POC) 3.0 (HH) 0.4 - 2.0 mmol/L   EKG, 12 LEAD, INITIAL    Collection Time: 03/07/18  2:01 PM   Result Value Ref Range    Ventricular Rate 80 BPM    Atrial Rate 80 BPM    P-R Interval 162 ms QRS Duration 90 ms    Q-T Interval 388 ms    QTC Calculation (Bezet) 447 ms    Calculated P Axis 34 degrees    Calculated R Axis 86 degrees    Calculated T Axis 30 degrees    Diagnosis       Normal sinus rhythm  Nonspecific T wave abnormality  Normal ECG  When compared with ECG of 15-SEP-2016 12:47,  T wave inversion no longer evident in Inferior leads  T wave inversion no longer evident in Anterior leads  QT has shortened  Confirmed by Vahid Colvin ([id number]) on 3/7/2018 2:35:51 PM             Assessment:  Principal Problem:    COPD with exacerbation ([address] (3/7/2018)    Active Problems:    Hyperlipidemia with target LDL less than 70 (7/13/2015)      Hashimoto's thyroiditis (12/28/2015)        Plan:    COPD exacerbation  -worsened 2/2 Flu noting and acute bronchitis  -never formelrly tested however suspicion is high. No formal PFT's noted  -supplemental 02  -ATC  -does not appear to be CAP will d/c rocephin and keep atypical coverage for now  -zithromax  -CTA negative for PE  -solumdrol    Flu  -hold on tamilfu as unlikely to be effective given late onset of symptoms  -droplet precaution    Lactic acidosis  -2/2 hypoxia    Hashimotos  -synthyroid    HLD  -statin    DVT prophyalxis  -scd/tds  -heparin sq    Signed:  Carmella Monique NP 3/7/2018

## 2018-03-07 NOTE — PROGRESS NOTES
Patient presents with productive cough of yellowish phlegm, nasal drainage/congestion, generalized body aches, HA x 4 days with fever this AM, states she was so SOB on exertion last night walking to the bath room,states she has been taking Mucinex OTC with minimal relief. Patient denies any CP,dizziness, abd pain, NVD. Patient hypoxemic, hx of former smoker x 30 years and chronic bronchitis, uses Atrovent Inhaler, that has not been very helpful.

## 2018-03-07 NOTE — PATIENT INSTRUCTIONS
Influenza (Flu): Care Instructions  Your Care Instructions    Influenza (flu) is an infection in the lungs and breathing passages. It is caused by the influenza virus. There are different strains, or types, of the flu virus from year to year. Unlike the common cold, the flu comes on suddenly and the symptoms, such as a cough, congestion, fever, chills, fatigue, aches, and pains, are more severe. These symptoms may last up to 10 days. Although the flu can make you feel very sick, it usually doesn't cause serious health problems. Home treatment is usually all you need for flu symptoms. But your doctor may prescribe antiviral medicine to prevent other health problems, such as pneumonia, from developing. Older people and those who have a long-term health condition, such as lung disease, are most at risk for having pneumonia or other health problems. Follow-up care is a key part of your treatment and safety. Be sure to make and go to all appointments, and call your doctor if you are having problems. It's also a good idea to know your test results and keep a list of the medicines you take. How can you care for yourself at home? · Get plenty of rest.  · Drink plenty of fluids, enough so that your urine is light yellow or clear like water. If you have kidney, heart, or liver disease and have to limit fluids, talk with your doctor before you increase the amount of fluids you drink. · Take an over-the-counter pain medicine if needed, such as acetaminophen (Tylenol), ibuprofen (Advil, Motrin), or naproxen (Aleve), to relieve fever, headache, and muscle aches. Read and follow all instructions on the label. No one younger than 20 should take aspirin. It has been linked to Reye syndrome, a serious illness. · Do not smoke. Smoking can make the flu worse. If you need help quitting, talk to your doctor about stop-smoking programs and medicines. These can increase your chances of quitting for good.   · Breathe moist air from a hot shower or from a sink filled with hot water to help clear a stuffy nose. · Before you use cough and cold medicines, check the label. These medicines may not be safe for young children or for people with certain health problems. · If the skin around your nose and lips becomes sore, put some petroleum jelly on the area. · To ease coughing:  ¨ Drink fluids to soothe a scratchy throat. ¨ Suck on cough drops or plain hard candy. ¨ Take an over-the-counter cough medicine that contains dextromethorphan to help you get some sleep. Read and follow all instructions on the label. ¨ Raise your head at night with an extra pillow. This may help you rest if coughing keeps you awake. · Take any prescribed medicine exactly as directed. Call your doctor if you think you are having a problem with your medicine. To avoid spreading the flu  · Wash your hands regularly, and keep your hands away from your face. · Stay home from school, work, and other public places until you are feeling better and your fever has been gone for at least 24 hours. The fever needs to have gone away on its own without the help of medicine. · Ask people living with you to talk to their doctors about preventing the flu. They may get antiviral medicine to keep from getting the flu from you. · To prevent the flu in the future, get a flu vaccine every fall. Encourage people living with you to get the vaccine. · Cover your mouth when you cough or sneeze. When should you call for help? Call 911 anytime you think you may need emergency care. For example, call if:  ? · You have severe trouble breathing. ?Call your doctor now or seek immediate medical care if:  ? · You have new or worse trouble breathing. ? · You seem to be getting much sicker. ? · You feel very sleepy or confused. ? · You have a new or higher fever. ? · You get a new rash. ? Watch closely for changes in your health, and be sure to contact your doctor if:  ? · You begin to get better and then get worse. ? · You are not getting better after 1 week. Where can you learn more? Go to http://sujata-cara.info/. Enter W429 in the search box to learn more about \"Influenza (Flu): Care Instructions. \"  Current as of: May 12, 2017  Content Version: 11.4  © 6981-7683 [employer]. Care instructions adapted under license by [employer] (which disclaims liability or warranty for this information). If you have questions about a medical condition or this instruction, always ask your healthcare professional. [place] [address] any warranty or liability for your use of this information. Wheezing or Bronchoconstriction: Care Instructions  Your Care Instructions  Wheezing is a whistling noise made during breathing. It occurs when the small airways, or bronchial tubes, that lead to your lungs swell or contract (spasm) and become narrow. This narrowing is called bronchoconstriction. When your airways constrict, it is hard for air to pass through and this makes it hard for you to breathe. Wheezing and bronchoconstriction can be caused by many problems, including:  · An infection such as the flu or a cold. · Allergies such as hay fever. · Diseases such as asthma or chronic obstructive pulmonary disease. · Smoking. Treatment for your wheezing depends on what is causing the problem. Your wheezing may get better without treatment. But you may need to pay attention to things that cause your wheezing and avoid them. Or you may need medicine to help treat the wheezing and to reduce the swelling or to relieve spasms in your lungs. Follow-up care is a key part of your treatment and safety. Be sure to make and go to all appointments, and call your doctor if you are having problems. It is also a good idea to know your test results and keep a list of the medicines you take. How can you care for yourself at home?   · Take your medicine exactly as prescribed. Call your doctor if you think you are having a problem with your medicine. You will get more details on the specific medicine your doctor prescribes. · If your doctor prescribed antibiotics, take them as directed. Do not stop taking them just because you feel better. You need to take the full course of antibiotics. · Breathe moist air from a humidifier, hot shower, or sink filled with hot water. This may help ease your symptoms and make it easier for you to breathe. · If you have congestion in your nose and throat, drinking plenty of fluids, especially hot fluids, may help relieve your symptoms. If you have kidney, heart, or liver disease and have to limit fluids, talk with your doctor before you increase the amount of fluids you drink. · If you have mucus in your airways, it may help to breathe deeply and cough. · Do not smoke or allow others to smoke around you. Smoking can make your wheezing worse. If you need help quitting, talk to your doctor about stop-smoking programs and medicines. These can increase your chances of quitting for good. · Avoid things that may cause your wheezing. These may include colds, smoke, air pollution, dust, pollen, pets, cockroaches, stress, and cold air. When should you call for help? Call 911 anytime you think you may need emergency care. For example, call if:  ? · You have severe trouble breathing. ? · You passed out (lost consciousness). ?Call your doctor now or seek immediate medical care if:  ? · You cough up yellow, dark brown, or bloody mucus (sputum). ? · You have new or worse shortness of breath. ? · Your wheezing is not getting better or it gets worse after you start taking your medicine. ? Watch closely for changes in your health, and be sure to contact your doctor if:  ? · You do not get better as expected. Where can you learn more? Go to http://sujata-cara.info/.   Enter 799 [id number] in the search box to learn more about \"Wheezing or Bronchoconstriction: Care Instructions. \"  Current as of: May 12, 2017  Content Version: 11.4  © 2777-6024 [employer]. Care instructions adapted under license by [employer] (which disclaims liability or warranty for this information). If you have questions about a medical condition or this instruction, always ask your healthcare professional. [address] any warranty or liability for your use of this information. Learning About Chronic Bronchitis  What is chronic bronchitis? Chronic bronchitis is long-term swelling and the buildup of mucus in the airways of your lungs. The airways (bronchial tubes) get inflamed and make a lot of mucus. This can narrow or block the airways, making it hard for you to breathe. It is a form of COPD (chronic obstructive pulmonary disease). Chronic bronchitis is usually caused by smoking. But chemical fumes, dust, or air pollution also can cause it over time. What can you expect when you have chronic bronchitis? Chronic bronchitis gets worse over time. You cannot undo the damage to your lungs. Over time, you may find that:  · You get short of breath even when you do simple things like get dressed or fix a meal.  · It is hard to eat or exercise. · You lose weight and feel weaker. Over many years, the swelling and mucus from chronic bronchitis make it more likely that you will get lung infections. But there are things you can do to prevent more damage and feel better. What are the symptoms? The main symptoms of chronic bronchitis are:  · A cough that will not go away. · Mucus that comes up when you cough. · Shortness of breath that gets worse when you exercise. At times, your symptoms may suddenly flare up and get much worse. This is a called an exacerbation (say \"egg-CORNEL-er-BAY-pamela\"). When this happens, your usual symptoms quickly get worse and stay bad. This can be dangerous.  You may have to go to the hospital.  How can you keep chronic bronchitis from getting worse? Don't smoke. That is the best way to keep chronic bronchitis from getting worse. If you already smoke, it is never too late to stop. If you need help quitting, talk to your doctor about stop-smoking programs and medicines. These can increase your chances of quitting for good. You can do other things to keep chronic bronchitis from getting worse:  · Avoid bad air. Air pollution, chemical fumes, and dust also can make chronic bronchitis worse. · Get a flu shot every year. A shot may keep the flu from turning into something more serious, like pneumonia. A flu shot also may lower your chances of having a flare-up. · Get a pneumococcal shot. A shot can prevent some of the serious complications of pneumonia. Ask your doctor how often you should get this shot. How is chronic bronchitis treated? Chronic bronchitis is treated with medicines and oxygen. You also can take steps at home to stay healthy and keep your condition from getting worse. Medicines and oxygen therapy  · You may be taking medicines such as:  ¨ Bronchodilators. These help open your airways and make breathing easier. Bronchodilators are either short-acting (work for 6 to 9 hours) or long-acting (work for 24 hours). You inhale most bronchodilators, so they start to act quickly. Always carry your quick-relief inhaler with you in case you need it while you are away from home. ¨ Corticosteroids. These reduce airway inflammation. They come in pill or inhaled form. You must take these medicines every day for them to work well. ¨ Antibiotics. These medicines are used when you have a bacterial lung infection. · Take your medicines exactly as prescribed. Call your doctor if you think you are having a problem with your medicine. · Oxygen therapy boosts the amount of oxygen in your blood and helps you breathe easier.  Use the flow rate your doctor has recommended, and do not change it without talking to your doctor first.  Other care at home  · If your doctor recommends it, get more exercise. Walking is a good choice. Bit by bit, increase the amount you walk every day. Try for at least 30 minutes on most days of the week. · Learn breathing methods-such as breathing through pursed lips-to help you become less short of breath. · If your doctor has not set you up with a pulmonary rehabilitation program, talk to him or her about whether rehab is right for you. Rehab includes exercise programs, education about your disease and how to manage it, help with diet and other changes, and emotional support. · Eat regular, healthy meals. Use bronchodilators about 1 hour before you eat to make it easier to eat. Eat several small meals instead of three large ones. Drink beverages at the end of the meal. Avoid foods that are hard to chew. Follow-up care is a key part of your treatment and safety. Be sure to make and go to all appointments, and call your doctor if you are having problems. It's also a good idea to know your test results and keep a list of the medicines you take. Where can you learn more? Go to http://sujataNimble TVcara.info/. Enter D259 in the search box to learn more about \"Learning About Chronic Bronchitis. \"  Current as of: May 12, 2017  Content Version: 11.4  © 5681-7355 [employer]. Care instructions adapted under license by [employer] (which disclaims liability or warranty for this information). If you have questions about a medical condition or this instruction, always ask your healthcare professional. [place] [address] any warranty or liability for your use of this information. Learning About Hypoxemia  What is hypoxemia? Hypoxemia means that you don't have enough oxygen in your blood. It's a result of diseases that affect your heart or lungs. These include heart failure, COPD, and pulmonary fibrosis (scarring of the lungs). Being at high altitudes can also lead to hypoxemia. What happens when you have hypoxemia? Oxygen gets into your blood through your lungs. Your blood carries the oxygen to all parts of your body. When you have too little oxygen in your blood, your body doesn't get enough of it. With too little oxygen, your heart and other parts of your body don't work very well. What are the symptoms? In addition to the symptoms of whatever is causing your hypoxemia, you may:  · Get tired quickly. · Be short of breath when you are active. · Feel like your heart is pounding or racing. · Feel weak or dizzy. · Become confused. How is hypoxemia treated? Your doctor will do tests to find out how much oxygen is in your blood. He or she will look for the cause of your hypoxemia and treat that problem. For example, if you have heart failure, you may need medicines that help your heart pump better. · If your hypoxemia is not severe, your doctor may give you oxygen through a mask or nasal cannula (say \"JUVENAL-rebeca-vy\"). A cannula is a thin tube with two openings that fit just inside your nose. · If your hypoxemia is severe, you may have a breathing tube put into your windpipe. The breathing tube is attached to a machine that pushes air into your lungs. This machine is called a ventilator. · If you have a long-term problem with hypoxemia, your doctor may recommend that you use oxygen regularly. Some people need it all the time. Others need it from time to time throughout the day or overnight. Your doctor will tell you how much oxygen you need and how often to use it. Follow-up care is a key part of your treatment and safety. Be sure to make and go to all appointments, and call your doctor if you are having problems. It's also a good idea to know your test results and keep a list of the medicines you take. Where can you learn more? Go to http://sujata-cara.info/.   Enter M375 in the search box to learn more about \"Learning About Hypoxemia. \"  Current as of: May 12, 2017  Content Version: 11.4  © 8548-2523 [employer]wise[employer] Inc[employer]. Care instructions adapted under license by [employer] (which disclaims liability or warranty for this information). If you have questions about a medical condition or this instruction, always ask your healthcare professional. [place] [address] any warranty or liability for your use of this information.

## 2018-03-07 NOTE — PROGRESS NOTES
ROOM #     Donald Callahan presents today for   Chief Complaint   Patient presents with    Cold Symptoms     coughing yellow phlegm, nasla drainage, fever 99.8 this am, congestion, sob x 4days      Pt stated she has tried mucinex, flonase, and cough drops which gave little relief. Donald Callahan preferred language for health care discussion is english/other. Is someone accompanying this pt? No     Is the patient using any DME equipment during OV? No     Depression Screening:  PHQ over the last two weeks 3/7/2018 1/30/2018 2/15/2017 5/4/2016 7/21/2015 9/17/2014   Little interest or pleasure in doing things Not at all Not at all Not at all Not at all Not at all Not at all   Feeling down, depressed or hopeless Not at all Not at all Not at all Not at all Not at all Not at all   Total Score PHQ 2 0 0 0 0 0 0       Learning Assessment:  Learning Assessment 9/17/2014   PRIMARY LEARNER Patient   HIGHEST LEVEL OF EDUCATION - PRIMARY LEARNER  GRADUATED HIGH SCHOOL OR GED   BARRIERS PRIMARY LEARNER NONE   PRIMARY LANGUAGE ENGLISH   LEARNER PREFERENCE PRIMARY LISTENING   ANSWERED BY pt   RELATIONSHIP SELF       Abuse Screening:  Abuse Screening Questionnaire 2/15/2017 5/4/2016 7/21/2015   Do you ever feel afraid of your partner? N N N   Are you in a relationship with someone who physically or mentally threatens you? N N N   Is it safe for you to go home? Chad Salgado       Fall Risk  Fall Risk Assessment, last 12 mths 3/7/2018 1/30/2018 5/4/2016 7/21/2015 9/17/2014   Able to walk? Yes Yes Yes Yes Yes   Fall in past 12 months? No No No No No       Health Maintenance reviewed and discussed per provider. Yes, will f/u with PCP. Advance Directive:  1. Do you have an advance directive in place? Patient Reply:  Yes     2. If not, would you like material regarding how to put one in place? Patient Reply: No    Coordination of Care:  1. Have you been to the ER, urgent care clinic since your last 60 days?   Hospitalized since your last 60 days? No     2. Have you seen or consulted any other health care providers outside of the [address] [place] [address] since your last 60 days?   No

## 2018-03-08 PROBLEM — J20.9 ACUTE BRONCHITIS: Status: ACTIVE | Noted: 2018-03-08

## 2018-03-08 PROBLEM — J11.1 INFLUENZA: Status: ACTIVE | Noted: 2018-03-08

## 2018-03-08 LAB
ANION GAP SERPL CALC-SCNC: 9 MMOL/L (ref 3–18)
BUN SERPL-MCNC: 14 MG/DL (ref 7–18)
BUN/CREAT SERPL: 20 (ref 12–20)
CALCIUM SERPL-MCNC: 7.8 MG/DL (ref 8.5–10.1)
CHLORIDE SERPL-SCNC: 105 MMOL/L (ref 100–108)
CO2 SERPL-SCNC: 25 MMOL/L (ref 21–32)
CREAT SERPL-MCNC: 0.71 MG/DL (ref 0.6–1.3)
ERYTHROCYTE [DISTWIDTH] IN BLOOD BY AUTOMATED COUNT: 13.2 % (ref 11.6–14.5)
GLUCOSE SERPL-MCNC: 167 MG/DL (ref 74–99)
HCT VFR BLD AUTO: 36.6 % (ref 35–45)
HGB BLD-MCNC: 12 G/DL (ref 12–16)
MCH RBC QN AUTO: 31.3 PG (ref 24–34)
MCHC RBC AUTO-ENTMCNC: 32.8 G/DL (ref 31–37)
MCV RBC AUTO: 95.6 FL (ref 74–97)
PLATELET # BLD AUTO: 183 K/UL (ref 135–420)
PMV BLD AUTO: 10.3 FL (ref 9.2–11.8)
POTASSIUM SERPL-SCNC: 3.8 MMOL/L (ref 3.5–5.5)
RBC # BLD AUTO: 3.83 M/UL (ref 4.2–5.3)
SODIUM SERPL-SCNC: 139 MMOL/L (ref 136–145)
WBC # BLD AUTO: 6 K/UL (ref 4.6–13.2)

## 2018-03-08 PROCEDURE — 65660000000 HC RM CCU STEPDOWN

## 2018-03-08 PROCEDURE — 94640 AIRWAY INHALATION TREATMENT: CPT

## 2018-03-08 PROCEDURE — 85027 COMPLETE CBC AUTOMATED: CPT | Performed by: NURSE PRACTITIONER

## 2018-03-08 PROCEDURE — 80048 BASIC METABOLIC PNL TOTAL CA: CPT | Performed by: NURSE PRACTITIONER

## 2018-03-08 PROCEDURE — 74011250637 HC RX REV CODE- 250/637: Performed by: HOSPITALIST

## 2018-03-08 PROCEDURE — 74011000250 HC RX REV CODE- 250: Performed by: NURSE PRACTITIONER

## 2018-03-08 PROCEDURE — 74011250636 HC RX REV CODE- 250/636: Performed by: PHYSICIAN ASSISTANT

## 2018-03-08 PROCEDURE — 36415 COLL VENOUS BLD VENIPUNCTURE: CPT | Performed by: NURSE PRACTITIONER

## 2018-03-08 PROCEDURE — 74011250636 HC RX REV CODE- 250/636: Performed by: NURSE PRACTITIONER

## 2018-03-08 PROCEDURE — 97116 GAIT TRAINING THERAPY: CPT

## 2018-03-08 PROCEDURE — 97161 PT EVAL LOW COMPLEX 20 MIN: CPT

## 2018-03-08 PROCEDURE — 97162 PT EVAL MOD COMPLEX 30 MIN: CPT

## 2018-03-08 PROCEDURE — 74011250637 HC RX REV CODE- 250/637: Performed by: NURSE PRACTITIONER

## 2018-03-08 RX ORDER — GUAIFENESIN 600 MG/1
600 TABLET, EXTENDED RELEASE ORAL EVERY 12 HOURS
Status: DISCONTINUED | OUTPATIENT
Start: 2018-03-08 | End: 2018-03-09 | Stop reason: HOSPADM

## 2018-03-08 RX ADMIN — METHYLPREDNISOLONE SODIUM SUCCINATE 40 MG: 40 INJECTION, POWDER, FOR SOLUTION INTRAMUSCULAR; INTRAVENOUS at 00:35

## 2018-03-08 RX ADMIN — LEVOTHYROXINE SODIUM 50 MCG: 50 TABLET ORAL at 07:40

## 2018-03-08 RX ADMIN — HYDROCODONE BITARTRATE AND ACETAMINOPHEN 1 TABLET: 5; 325 TABLET ORAL at 12:04

## 2018-03-08 RX ADMIN — ASPIRIN 81 MG: 81 TABLET, COATED ORAL at 09:16

## 2018-03-08 RX ADMIN — IPRATROPIUM BROMIDE AND ALBUTEROL SULFATE 3 ML: .5; 3 SOLUTION RESPIRATORY (INHALATION) at 20:54

## 2018-03-08 RX ADMIN — HYDROCODONE BITARTRATE AND ACETAMINOPHEN 1 TABLET: 5; 325 TABLET ORAL at 18:35

## 2018-03-08 RX ADMIN — HYDROCODONE BITARTRATE AND ACETAMINOPHEN 1 TABLET: 5; 325 TABLET ORAL at 21:53

## 2018-03-08 RX ADMIN — METHYLPREDNISOLONE SODIUM SUCCINATE 40 MG: 40 INJECTION, POWDER, FOR SOLUTION INTRAMUSCULAR; INTRAVENOUS at 11:54

## 2018-03-08 RX ADMIN — IPRATROPIUM BROMIDE AND ALBUTEROL SULFATE 3 ML: .5; 3 SOLUTION RESPIRATORY (INHALATION) at 04:47

## 2018-03-08 RX ADMIN — METHYLPREDNISOLONE SODIUM SUCCINATE 40 MG: 40 INJECTION, POWDER, FOR SOLUTION INTRAMUSCULAR; INTRAVENOUS at 23:54

## 2018-03-08 RX ADMIN — GUAIFENESIN 600 MG: 600 TABLET, EXTENDED RELEASE ORAL at 11:54

## 2018-03-08 RX ADMIN — HEPARIN SODIUM 5000 UNITS: 5000 INJECTION, SOLUTION INTRAVENOUS; SUBCUTANEOUS at 01:00

## 2018-03-08 RX ADMIN — HYDROCODONE BITARTRATE AND ACETAMINOPHEN 1 TABLET: 5; 325 TABLET ORAL at 07:48

## 2018-03-08 RX ADMIN — AZITHROMYCIN MONOHYDRATE 500 MG: 500 INJECTION, POWDER, LYOPHILIZED, FOR SOLUTION INTRAVENOUS at 18:09

## 2018-03-08 RX ADMIN — HYDROCODONE BITARTRATE AND ACETAMINOPHEN 1 TABLET: 5; 325 TABLET ORAL at 00:54

## 2018-03-08 RX ADMIN — GUAIFENESIN 600 MG: 600 TABLET, EXTENDED RELEASE ORAL at 21:53

## 2018-03-08 RX ADMIN — IPRATROPIUM BROMIDE AND ALBUTEROL SULFATE 3 ML: .5; 3 SOLUTION RESPIRATORY (INHALATION) at 08:06

## 2018-03-08 RX ADMIN — IPRATROPIUM BROMIDE AND ALBUTEROL SULFATE 3 ML: .5; 3 SOLUTION RESPIRATORY (INHALATION) at 00:30

## 2018-03-08 RX ADMIN — HEPARIN SODIUM 5000 UNITS: 5000 INJECTION, SOLUTION INTRAVENOUS; SUBCUTANEOUS at 18:10

## 2018-03-08 RX ADMIN — METHYLPREDNISOLONE SODIUM SUCCINATE 40 MG: 40 INJECTION, POWDER, FOR SOLUTION INTRAMUSCULAR; INTRAVENOUS at 07:40

## 2018-03-08 RX ADMIN — METHYLPREDNISOLONE SODIUM SUCCINATE 40 MG: 40 INJECTION, POWDER, FOR SOLUTION INTRAMUSCULAR; INTRAVENOUS at 18:10

## 2018-03-08 RX ADMIN — IPRATROPIUM BROMIDE AND ALBUTEROL SULFATE 3 ML: .5; 3 SOLUTION RESPIRATORY (INHALATION) at 11:04

## 2018-03-08 RX ADMIN — HEPARIN SODIUM 5000 UNITS: 5000 INJECTION, SOLUTION INTRAVENOUS; SUBCUTANEOUS at 09:16

## 2018-03-08 NOTE — PROGRESS NOTES
Internal Medicine Progress Note    Patient's Name: Elliott Menard  Admit Date: 3/7/2018  Length of Stay: 1      Assessment/Plan     Active Hospital Problems    Diagnosis Date Noted    Influenza 03/08/2018    Acute bronchitis 03/08/2018    COPD with exacerbation ([facility] [address] 03/07/2018    Obesity, Class I, BMI 30-34.9 02/04/2016    Hashimoto's thyroiditis 12/28/2015    Hyperlipidemia with target LDL less than 70 07/13/2015     - Cont duonebs, azithromycin  - O2 PRN and titrate off if able  - Cont steroids  - Ambulatory O2  - Droplet precautions  - Mucinex  - Cont acceptable home medications for chronic conditions   - DVT protocol    I have personally reviewed all pertinent labs and films that have officially resulted over the last 24 hours. I have personally checked for all pending labs that are awaiting final results.     Subjective     Pt s/e @ bedside  No major events overnight  Still feeling wheezy with cough  Denies CP or SOB    Objective     Visit Vitals    /69 (BP 1 Location: Left arm, BP Patient Position: Supine)    Pulse 75    Temp 98.2 °F (36.8 °C)    Resp 20    Ht 5' 8\" (1.727 m)    Wt 105.4 kg (232 lb 5.8 oz)    SpO2 95%    BMI 35.33 kg/m2       Physical Exam:  General Appearance: NAD, conversant  Lungs: Coarse w/ decreased BS and exp wheeze  CV: RRR, no m/r/g  Abdomen: soft, non-tender, normal bowel sounds  Extremities: no cyanosis, no peripheral edema  Neuro: No focal deficits, motor/sensory intact    Lab/Data Reviewed:  BMP:   Lab Results   Component Value Date/Time     03/08/2018 04:30 AM    K 3.8 03/08/2018 04:30 AM     03/08/2018 04:30 AM    CO2 25 03/08/2018 04:30 AM    AGAP 9 03/08/2018 04:30 AM     (H) 03/08/2018 04:30 AM    BUN 14 03/08/2018 04:30 AM    CREA 0.71 03/08/2018 04:30 AM    GFRAA >60 03/08/2018 04:30 AM    GFRNA >60 03/08/2018 04:30 AM     CBC:   Lab Results   Component Value Date/Time    WBC 6.0 03/08/2018 04:30 AM    HGB 12.0 03/08/2018 04:30 AM    HCT 36.6 03/08/2018 04:30 AM     03/08/2018 04:30 AM       Imaging Reviewed:  Cta Chest W Or W Wo Cont    Result Date: 3/7/2018  EXAM: CTA Chest INDICATION: Shortness of breath on exertion. Evaluation for pulmonary embolism is requested. COMPARISON: Same-day chest radiograph, no prior CT imaging of the chest is available for review. TECHNIQUE: Axial CT imaging from the thoracic inlet through the diaphragm with intravenous contrast utilizing CTA study for pulmonary artery evaluation. Coronal and sagittal MIP reformations were generated at a separate workstation. One or more dose reduction techniques were used on this CT: automated exposure control, adjustment of the mAs and/or kVp according to patient's size, and iterative reconstruction techniques. The specific techniques utilized on this CT exam have been documented in the patient's electronic medical record. _______________ FINDINGS: EXAM QUALITY: Overall exam quality is adequate. Pulmonary arterial enhancement is adequate. The breath hold is satisfactory. PULMONARY ARTERIES: There is no pulmonary embolism. The main pulmonary artery size is enlarged, measuring approximately 3.6 cm in size. MEDIASTINUM: Cardiac size is upper limits of normal. Thoracic aorta is normal in course and caliber with atherosclerotic calcification present diffusely. Small of fluid is present within the superior pericardial recess. No pericardial effusion. LYMPH NODES: No supraclavicular, axillary, mediastinal, or hilar adenopathy. AIRWAY: Central airways are patent. There is mild and diffuse lower lung predominant peribronchial thickening, with small foci of endobronchial mucoid impaction. LUNGS: Moderately severe upper lobe predominant centrilobular emphysema is present. Dependent atelectatic opacities are present at each lung base.  As above, mild peribronchial thickening is noted diffusely, lower lobe predominant, with endobronchial mucoid impaction noted in the lower lobes. PLEURA: No pleural effusion or pneumothorax. UPPER ABDOMEN: A small hiatal hernia is present. . OTHER: No acute or aggressive osseous abnormalities identified. Multilevel thoracic spondylosis is present. _______________     IMPRESSION: 1. No evidence of pulmonary embolism. 2.  Enlargement of the main pulmonary artery, in keeping with underlying pulmonary arterial hypertension. 3. Moderately severe, upper lobe predominant centrilobular emphysema. 4. Relatively diffuse, lower lung predominant peribronchial thickening, with small foci of distal endobronchial mucoid impaction. 5. Small hiatal hernia. Xr Chest Port    Result Date: 3/7/2018  Portable chest 3/7/2018. History: Cough with headache and weakness for one week. Low-grade fever. Technique: A semierect portable radiograph of the chest was obtained. Comparison:  6/10/2011. Findings: The cardiomediastinal contours are unchanged. Patchy infiltrates are noted at both lung bases. The lungs are otherwise clear. There is no pneumothorax or pleural effusion. Impression: 1. Patchy infiltrates at both lung bases.       Medications Reviewed:  Current Facility-Administered Medications   Medication Dose Route Frequency    sodium chloride (NS) flush 5-10 mL  5-10 mL IntraVENous PRN    azithromycin (ZITHROMAX) 500 mg in 0.9% sodium chloride (MBP/ADV) 250 mL adv  500 mg IntraVENous Q24H    levothyroxine (SYNTHROID) tablet 50 mcg  50 mcg Oral ACB    aspirin delayed-release tablet 81 mg  81 mg Oral DAILY    0.9% sodium chloride infusion  50 mL/hr IntraVENous CONTINUOUS    heparin (porcine) injection 5,000 Units  5,000 Units SubCUTAneous Q8H    acetaminophen (TYLENOL) tablet 650 mg  650 mg Oral Q4H PRN    HYDROcodone-acetaminophen (NORCO) 5-325 mg per tablet 1 Tab  1 Tab Oral Q4H PRN    albuterol-ipratropium (DUO-NEB) 2.5 MG-0.5 MG/3 ML  3 mL Nebulization Q4H RT    methylPREDNISolone (PF) (SOLU-MEDROL) injection 40 mg  40 mg IntraVENous Q6H Junior Ronquillo, DO  Internal Medicine, Hospitalist  Pager: [phone] [facility]

## 2018-03-08 NOTE — ANCILLARY DISCHARGE INSTRUCTIONS
Patient and/or next of kin has been given the [facility] Important Message From Medicare About Your Rights\" letter and all questions were answered.

## 2018-03-08 NOTE — PROGRESS NOTES
Patient refused 1600 breathing treatment at this time. Patient said the treatments made her throat sore and she felt like she was getting the breathing treatment too soon. She agreed to take her scheduled breathing treatment and she would call if she felt like she needed one before that. MD notified.

## 2018-03-08 NOTE — PROGRESS NOTES
08:00- Assessment completed- see flowsheet. Patient med at 07:48 for c/o headache that she reports is from coughing. Very scant amount of sputum report that is coughed up. Continue to monitor. Call light in reach. 12:00- No change in condition. Continue to monitor. 13:45- O2 turned off-- O2 sat 87% on room air at rest O2 reapplied @ 3lpm nasal O2=O2 sat=92%  16:00- Continue to monitor. Physical therapy ambulated with patient- see P.T note. 18:00- Patient reports she is able to expectorate more sputum since being on mucinex. 19:30- report to oncoming nurse.

## 2018-03-08 NOTE — PROGRESS NOTES
Problem: Mobility Impaired (Adult and Pediatric)  Goal: *Acute Goals and Plan of Care (Insert Text)  Outcome: Resolved/Met Date Met: 03/08/18  physical Therapy EVALUATION and Discharge    Patient: Josey Lennon (89 y.o. female)  Date: 3/8/2018  Primary Diagnosis: COPD with exacerbation ([facility] [address]        Precautions:   Fall    PLOF: I with adl's and ambulation without assistive device   ASSESSMENT AND RECOMMENDATIONS: patient ambulated in [place] with mask in place due to droplet precautions. Based on the objective data described below, the patient presents with  decreased strength increased coughing with mobility  And independent and safe functional mobility  . Patient having to stop once to catch breath  Reporting normal SAT's 89%. No pain reported. education to increase ambulation slowly when home and to check with physician when home   . Skilled physical therapy is not indicated at this time. Discharge Recommendations: pulmonary rehab   Further Equipment Recommendations for Discharge: N/A      SUBJECTIVE:   Patient stated .    OBJECTIVE DATA SUMMARY:     Past Medical History:   Diagnosis Date    Acute bronchitis 12/28/2015    Advance directive discussed with patient 5/4/2016    Pt would like to appoint her son, Luciana Park as her medical decision maker in the event that she can no longer do so. Phone number is [phone]. Her secondary person is her next door [facility], [employer]. Phone number is [phone]. If her death is imminent and medical treatment will not help her recover, pt does want life prolonging measures.   If her condition makes her unawar    Bilateral leg pain 10/22/2015    Elevated TSH 10/22/2015    Hashimoto's thyroiditis 12/28/2015    Hernia, abdominal     History of benign breast tumor     Menopause     Nicotine dependence in remission 2/4/2016    Obesity, Class I, BMI 30-34.9 2/4/2016    Prediabetes 10/22/2015    Simple chronic bronchitis ([facility] [address] 5/4/2016  Skin lesion 2/4/2016    Varicose vein of leg      Past Surgical History:   Procedure Laterality Date    HX BREAST BIOPSY Right     Milk ducts removed    HX CYST INCISION AND DRAINAGE Right     35 y/o    HX HERNIA REPAIR      abdominal x2    HX HERNIA REPAIR  09/15/2016    ROBOTIC REPAIR OF RECURRENT INCARCERATED HERNIA WITH EXTENSIVE LYSIS OF ADHESIONS WITH PLACEMENT OF MESH     Barriers to Learning/Limitations: None  Compensate with: visual, verbal, tactile, kinesthetic cues/model    G CODE:  Mobility  Current  CI= 1-19%   Goal  CI= 1-19%  D/C  CI= 1-19%. The severity rating is based on the Other [employer] Balance Scale4+/5   [employer] Balance Scale4+/5  0: Pt performs 25% or less of standing activity (Max assist) CN, 100% impaired. 1: Pt supports self with upper extremities but requires therapist assistance. Pt performs 25-50% of effort (Mod assist) CM, 80% to <100% impaired. 1+: Pt supports self with upper extremities but requires therapist assistance. Pt performs >50% effort. (Min assist). CL, 60% to <80% impaired. 2: Pt supports self independently with both upper extremities (walker, crutches, parallel bars). CL, 60% to <80% impaired. 2+: Pt support self independently with 1 upper extremity (cane, crutch, 1 parallel bar). CK, 40% to <60% impaired. 3: Pt stands without upper extremity support for up to 30 seconds. CK, 40% to <60% impaired. 3+: Pt stands without upper extremity support for 30 seconds or greater. CJ, 20% to <40% impaired. 4: Pt independently moves and returns center of gravity 1-2 inches in one plane. CJ, 20% to <40% impaired. 4+: Pt independently moves and returns center of gravity 1-2 inches in multiple planes. CI, 1% to <20% impaired. 5: Pt independently moves and returns center of gravity in all planes greater than 2 inches. CH, 0% impaired.       Eval Complexity: History: HIGH Complexity :3+ comorbidities / personal factors will impact the outcome/ POC Exam:MEDIUM Complexity : 3 Standardized tests and measures addressing body structure, function, activity limitation and / or participation in recreation  Presentation: LOW Complexity : Stable, uncomplicated  Clinical Decision Making:Low Complexity [facility] Standing Balance Scale4+/5 Overall Complexity:LOW     Prior Level of Function/Home Situation: I with adl's and ambulation without assistive device  Home Situation  Home Environment: Apartment  # Steps to Enter: 4  One/Two Story Residence: Two story  Living Alone: Yes  Support Systems: Family member(s)  Patient Expects to be Discharged to[de-identified] Apartment  Current DME Used/Available at Home: None  Tub or Shower Type: Tub/Shower combination  Critical Behavior:  Neurologic State: Alert  Orientation Level: Oriented X4  Cognition: Follows commands  Safety/Judgement: Fall prevention  Psychosocial  Patient Behaviors: Calm; Cooperative  Purposeful Interaction: Yes  Pt Identified Daily Priority: Clinical issues (comment)  Caritas Process: Nurture loving kindness;Establish trust;Teaching/learning; Attend basic human needs;Create healing environment  Caring Interventions: Reassure; Therapeutic modalities  Reassure: Informing;Caring rounds  Strength:    Strength: Generally decreased, functional (3+/5 both leg s)    Range Of Motion:  AROM: Within functional limits  PROM: Within functional limits  Functional Mobility:  Bed Mobility:  Supine to Sit: Modified independent  Sit to Supine: Modified independent  Transfers:  Sit to Stand: Modified independent  Stand to Sit: Modified independent  Balance:   Sitting: Intact  Standing: Intact  Ambulation/Gait Training:  Distance (ft): 200 Feet (ft) (x2)  Assistive Device:  (room air )  Ambulation - Level of Assistance: Supervision;Modified independent  Gait Description (WDL): Exceptions to WDL  Gait Abnormalities: Path deviations  Base of Support: Center of gravity altered  Speed/[place]: Fluctuations  Interventions: Safety awareness training  Pain:  Pre treatment pain:0  Post treatment pain:0  Pain Scale 1: Numeric (0 - 10)  Activity Tolerance:   Fair   Please refer to the flowsheet for vital signs taken during this treatment. After treatment:   []         Patient left in no apparent distress sitting up in chair  [x]         Patient left in no apparent distress in bed  [x]         Call bell left within reach  [x]         Nursing notified  []         Caregiver present  []         Bed alarm activated    COMMUNICATION/EDUCATIONverbalized and demonstrated understanding of plan    [x]         Fall prevention education was provided and the patient/caregiver indicated understanding. [x]         Patient/family have participated as able in goal setting and plan of care. [x]         Patient/family agree to work toward stated goals and plan of care. []         Patient understands intent and goals of therapy, but is neutral about his/her participation. []         Patient is unable to participate in goal setting and plan of care.     Thank you for this referral.  Sarah Aguilar, PT   Time Calculation: 20 mins

## 2018-03-08 NOTE — PROGRESS NOTES
[facility]   Discharge Planning/ Assessment    Reasons for Intervention: Spoke with patient in room, she stated that she lives alone and is independent with ADL's. She stated that she has no DME's at this time. She drives and plans to return home upon discharge. She verified he demographics, PCP, and insurance.     Plan : home    High Risk Criteria  [x] Yes  []No   Physician Referral  [] Yes  []No        Date    Nursing Referral  [] Yes  []No        Date    Patient/Family Request  [] Yes  []No        Date       Resources:    Medicare  [x] Yes  []No   Medicaid  [] Yes  []No   No Resources  [] Yes  []No   Private Insurance  [] Yes  []No    Name/Phone Number    Other  [] Yes  []No        (i.e. Workman's Comp)         Prior Services:    Prior Services  [] Yes  [x]No   Home Health  [] Yes  []No   [address] [place]  [] Yes  []No        Number of [address]  [] Yes  []No       Meals on Wheels  [] Yes  []No   Office on Aging  [] Yes  []No   Transportation Services  [] Yes  []No   [address]  [] Yes  []No        Nursing Home Name    [address]  [] Yes  []No        [address] Name    Other       Information Source:      Information obtained from  [x] Patient  [] Parent   [] Elma Hamman  [] Child  [] Spouse   [] Significant Other/Partner   [] Friend      [] EMS    [] Nursing Home Chart          [] Other:   Chart Review  [x] Yes  []No     Family/Support System:    Patient lives with  [x] Alone    [] Spouse   [] Significant Other  [] Children  [] Caretaker   [] Parent  [] Sibling     [] Other       Other Support System:    Is the patient responsible for care of others  [] Yes  []No   Information of person caring for patient on  discharge    Managers financial affairs independently  [] Yes  []No   If no, explain:      Status Prior to Admission:    Mental Status  [x] Awake  [x] Alert  [x] Oriented  [] Quiet/Calm [] Lethargic/Sedated   [] Disoriented  [] Restless/Anxious  [] Combative   Personal Care  [] Dependent  [x] Independent Personal Care  [] Requires Assistance   Meal Preparation Ability  [x] Independent   [] Standby Assistance   [] Minimal Assistance   [] Moderate Assistance  [] Maximum Assistance     [] Total Assistance   Chores  [x] Independent with Chores   [] N/A Nursing Home Resident   [] Requires Assistance   Bowel/Bladder  [x] Continent  [] Catheter  [] Incontinent  [] Ostomy Self-Care    [] Urine Diversion Self-Care  [] Maximum Assistance     [] Total Assistance   Number of Persons needed for assistance    DME at home  [] Anaheim Custard, Angelic Darting  [] Narendra Custard, Straight   [] Commode    [] Bathroom/Grab Bars  [] Hospital Bed  [] Nebulizer  [] Oxygen           [] Raised Toilet Seat  [] Shower Chair  [] Side Rails for Bed   [] Tub Transfer Bench   [] Colin Joseph  [] Tobias Dorantes, Standard      [] Other:   Vendor      Treatment Presently Receiving:    Current Treatments  [] Chemotherapy  [] Dialysis  [] Insulin  [] IVAB [] IVF   [] O2  [] PCA   [] PT   [] RT   [] Tube Feedings   [] Wound Care     Psychosocial Evaluation:    Verbalized Knowledge of Disease Process  [] Patient  []Family   Coping with Disease Process  [] Patient  []Family   Requires Further Counseling Coping with Disease Process  [] Patient  []Family     Identified Projected Needs:    Home Health Aid  [] Yes  []No   Transportation  [] Yes  []No   Education  [] Yes  []No        Specific Education     Financial Counseling  [] Yes  []No   Inability to Care for Self/Will Require 24 hour care  [] Yes  []No   Pain Management  [] Yes  []No   Home Infusion Therapy  [] Yes  []No   Oxygen Therapy  [] Yes  []No   DME  [] Yes  []No   Long Term Care Placement  [] Yes  []No   Rehab  [] Yes  []No   Physical Therapy  [] Yes  []No   Needs Anticipated At This Time  [] Yes  []No     Intra-Hospital Referral:    [facility]  [] Yes  []No     [] Yes  []No   Patient Representative  [] Yes  []No   Staff for Teaching Needs  [] Yes  []No   Specialty Teaching Needs     Diabetic Educator  [] Yes  []No   Referral for Diabetic Educator Needed  [] Yes  []No  If Yes, place order for Nutritionist or Diabetic Consult     Tentative Discharge Plan:    Home with No Services  [] Yes  []No   Home with Home Health Follow-up  [x] Yes  []No        If Yes, specify type [address]  [] Yes  []No        If Yes, specify type    Meals on Wheels  [] Yes  []No   Office of Aging  [] Yes  []No   NHP  [] Yes  []No   Return to the Nursing Home  [] Yes  []No   Rehab Therapy  [] Yes  []No   Acute Rehab  [] Yes  []No   Subacute Rehab  [] Yes  []No   Private Care  [] Yes  []No   Substance Abuse Referral  [] Yes  []No   Transportation  [] Yes  []No   Chore Service  [] Yes  []No   Inpatient Hospice  [] Yes  []No   OP RT  [] Yes  [] No   OP Hemo  [] Yes  [] No   OP PT  [] Yes  []No   Support Group  [] Yes  []No   Reach to Recovery  [] Yes  []No   OP Oncology Clinic  [] Yes  []No   Clinic Appointment  [] Yes  []No   DME  [] Yes  []No   Comments    Name of D/C Planner or  Given to Patient or [address]  [phone]   Phone Number         Extension    Date 3/8/17   Time    If you are discharged home, whom do you designate to participate in your discharge plan and receive any information needed?      Enter name of designee         Phone # of designee         Address of designee         Updated         Patient refused to designate any           individual

## 2018-03-08 NOTE — PROGRESS NOTES
Patient has designated ____son____________________ to participate in his/her discharge plan and to receive any needed information.      Name:  Fatuma Willett  [phone]  Address:  Phone number:

## 2018-03-08 NOTE — PROGRESS NOTES
conducted an initial consultation and Spiritual Assessment for Jose Benavides, who is a 67 y.o.,female. Patients Primary Language is: [place]. According to the patients EMR Confucianist Affiliation is: Jain. The reason the Patient came to the hospital is:   Patient Active Problem List    Diagnosis Date Noted    COPD with exacerbation ([facility] [address] 03/07/2018    Advance directive discussed with patient 05/04/2016    Nicotine dependence in remission 02/04/2016    Obesity, Class I, BMI 30-34.9 02/04/2016    Hashimoto's thyroiditis 12/28/2015    Hyperlipidemia with target LDL less than 70 07/13/2015        The  provided the following Interventions:  Initiated a relationship of care and support. Explored issues of micheal, spirituality and/or Orthodoxy needs while hospitalized. Listened empathically. Provided chaplaincy education. Provided information about Spiritual Care Services. Offered prayer and assurance of continued prayers on patient's behalf. Chart reviewed. The following outcomes were achieved:  Patient shared some information about their medical narrative and spiritual journey/beliefs. Patient processed feeling about current hospitalization. Patient expressed gratitude for the 's visit. Assessment:  Patient did not indicate any spiritual or Orthodoxy issues which require Spiritual Care Services interventions at this time. Patient does not have any Orthodoxy/cultural needs that will affect patients preferences in health care. Plan:  Chaplains will continue to follow and will provide pastoral care on an as needed or requested basis.  recommends bedside caregivers page  on duty if patient shows signs of acute spiritual or emotional distress.     [address] [facility]   Staff [address] [facility]   [phone]

## 2018-03-08 NOTE — PROGRESS NOTES
Problem: Discharge Planning  Goal: *Discharge to safe environment  Outcome: Progressing Towards Goal  Home with [facility]

## 2018-03-09 ENCOUNTER — HOME HEALTH ADMISSION (OUTPATIENT)
Dept: HOME HEALTH SERVICES | Facility: HOME HEALTH | Age: 73
End: 2018-03-09

## 2018-03-09 VITALS
SYSTOLIC BLOOD PRESSURE: 139 MMHG | BODY MASS INDEX: 34.86 KG/M2 | HEIGHT: 68 IN | OXYGEN SATURATION: 85 % | RESPIRATION RATE: 18 BRPM | WEIGHT: 230 LBS | TEMPERATURE: 97.9 F | HEART RATE: 78 BPM | DIASTOLIC BLOOD PRESSURE: 68 MMHG

## 2018-03-09 LAB
ANION GAP SERPL CALC-SCNC: 5 MMOL/L (ref 3–18)
BUN SERPL-MCNC: 23 MG/DL (ref 7–18)
BUN/CREAT SERPL: 31 (ref 12–20)
CALCIUM SERPL-MCNC: 8 MG/DL (ref 8.5–10.1)
CHLORIDE SERPL-SCNC: 105 MMOL/L (ref 100–108)
CO2 SERPL-SCNC: 30 MMOL/L (ref 21–32)
CREAT SERPL-MCNC: 0.75 MG/DL (ref 0.6–1.3)
ERYTHROCYTE [DISTWIDTH] IN BLOOD BY AUTOMATED COUNT: 13.3 % (ref 11.6–14.5)
GLUCOSE SERPL-MCNC: 126 MG/DL (ref 74–99)
HCT VFR BLD AUTO: 37.3 % (ref 35–45)
HGB BLD-MCNC: 12.1 G/DL (ref 12–16)
MCH RBC QN AUTO: 31.4 PG (ref 24–34)
MCHC RBC AUTO-ENTMCNC: 32.4 G/DL (ref 31–37)
MCV RBC AUTO: 96.9 FL (ref 74–97)
PLATELET # BLD AUTO: 196 K/UL (ref 135–420)
PMV BLD AUTO: 10.3 FL (ref 9.2–11.8)
POTASSIUM SERPL-SCNC: 4.4 MMOL/L (ref 3.5–5.5)
RBC # BLD AUTO: 3.85 M/UL (ref 4.2–5.3)
SODIUM SERPL-SCNC: 140 MMOL/L (ref 136–145)
WBC # BLD AUTO: 14.4 K/UL (ref 4.6–13.2)

## 2018-03-09 PROCEDURE — 74011250637 HC RX REV CODE- 250/637: Performed by: NURSE PRACTITIONER

## 2018-03-09 PROCEDURE — 94760 N-INVAS EAR/PLS OXIMETRY 1: CPT

## 2018-03-09 PROCEDURE — 36415 COLL VENOUS BLD VENIPUNCTURE: CPT | Performed by: NURSE PRACTITIONER

## 2018-03-09 PROCEDURE — 80048 BASIC METABOLIC PNL TOTAL CA: CPT | Performed by: NURSE PRACTITIONER

## 2018-03-09 PROCEDURE — 85027 COMPLETE CBC AUTOMATED: CPT | Performed by: NURSE PRACTITIONER

## 2018-03-09 PROCEDURE — 74011250636 HC RX REV CODE- 250/636: Performed by: NURSE PRACTITIONER

## 2018-03-09 PROCEDURE — 97165 OT EVAL LOW COMPLEX 30 MIN: CPT

## 2018-03-09 PROCEDURE — 74011250637 HC RX REV CODE- 250/637: Performed by: HOSPITALIST

## 2018-03-09 PROCEDURE — 94640 AIRWAY INHALATION TREATMENT: CPT

## 2018-03-09 PROCEDURE — 77010033678 HC OXYGEN DAILY

## 2018-03-09 PROCEDURE — 74011000250 HC RX REV CODE- 250: Performed by: NURSE PRACTITIONER

## 2018-03-09 RX ORDER — AZITHROMYCIN 250 MG/1
500 TABLET, FILM COATED ORAL DAILY
Status: DISCONTINUED | OUTPATIENT
Start: 2018-03-09 | End: 2018-03-09 | Stop reason: HOSPADM

## 2018-03-09 RX ORDER — AZITHROMYCIN 250 MG/1
250 TABLET, FILM COATED ORAL DAILY
Qty: 3 TAB | Refills: 0 | Status: SHIPPED | OUTPATIENT
Start: 2018-03-09 | End: 2018-03-12

## 2018-03-09 RX ADMIN — IPRATROPIUM BROMIDE AND ALBUTEROL SULFATE 3 ML: .5; 3 SOLUTION RESPIRATORY (INHALATION) at 05:14

## 2018-03-09 RX ADMIN — IPRATROPIUM BROMIDE AND ALBUTEROL SULFATE 3 ML: .5; 3 SOLUTION RESPIRATORY (INHALATION) at 08:25

## 2018-03-09 RX ADMIN — HYDROCODONE BITARTRATE AND ACETAMINOPHEN 1 TABLET: 5; 325 TABLET ORAL at 09:13

## 2018-03-09 RX ADMIN — METHYLPREDNISOLONE SODIUM SUCCINATE 40 MG: 40 INJECTION, POWDER, FOR SOLUTION INTRAMUSCULAR; INTRAVENOUS at 06:46

## 2018-03-09 RX ADMIN — METHYLPREDNISOLONE SODIUM SUCCINATE 40 MG: 40 INJECTION, POWDER, FOR SOLUTION INTRAMUSCULAR; INTRAVENOUS at 11:34

## 2018-03-09 RX ADMIN — HEPARIN SODIUM 5000 UNITS: 5000 INJECTION, SOLUTION INTRAVENOUS; SUBCUTANEOUS at 02:54

## 2018-03-09 RX ADMIN — HEPARIN SODIUM 5000 UNITS: 5000 INJECTION, SOLUTION INTRAVENOUS; SUBCUTANEOUS at 09:03

## 2018-03-09 RX ADMIN — AZITHROMYCIN 500 MG: 250 TABLET, FILM COATED ORAL at 12:24

## 2018-03-09 RX ADMIN — IPRATROPIUM BROMIDE AND ALBUTEROL SULFATE 3 ML: .5; 3 SOLUTION RESPIRATORY (INHALATION) at 00:53

## 2018-03-09 RX ADMIN — GUAIFENESIN 600 MG: 600 TABLET, EXTENDED RELEASE ORAL at 09:03

## 2018-03-09 RX ADMIN — LEVOTHYROXINE SODIUM 50 MCG: 50 TABLET ORAL at 06:46

## 2018-03-09 RX ADMIN — IPRATROPIUM BROMIDE AND ALBUTEROL SULFATE 3 ML: .5; 3 SOLUTION RESPIRATORY (INHALATION) at 11:43

## 2018-03-09 RX ADMIN — ASPIRIN 81 MG: 81 TABLET, COATED ORAL at 09:03

## 2018-03-09 NOTE — DISCHARGE SUMMARY
Internal Medicine Discharge Summary        Patient: Lizzie Christian    YOB: 1945    Age:  67 y.o. Admit Date: 3/7/2018    Discharge Date: 3/9/2018    LOS:  LOS: 2 days     Discharge To: Home    Consults: None    Admission Diagnoses: COPD with exacerbation [facility])    Discharge Diagnoses:    Problem List as of 3/9/2018  Date Reviewed: 3/7/2018          Codes Class Noted - Resolved    Influenza ICD-10-CM: J11.1  ICD-9-CM: 487.1  3/8/2018 - Present        Acute bronchitis ICD-10-CM: J20.9  ICD-9-CM: 466.0  3/8/2018 - Present        * (Principal)COPD with exacerbation [facility]) ICD-10-CM: J44.1  ICD-9-CM: 491.21  3/7/2018 - Present        Advance directive discussed with patient ICD-10-CM: Z71.89  ICD-9-CM: V65.49  5/4/2016 - Present    Overview Addendum 5/4/2016 11:11 AM by Jai Valentino MD     Pt would like to appoint her son, Yimi Valerio as her medical decision maker in the event that she can no longer do so. Phone number is [phone]. Her secondary [facility] Phone number is [phone]. If her death is imminent and medical treatment will not help her recover, pt does not want life prolonging measures. If her condition makes her unaware of herself or her surroundings and she cannot interact with others, pt does not want life prolonging measures. Advance directive scanned in the chart under media.                Nicotine dependence in remission ICD-10-CM: F17.201  ICD-9-CM: V15.82  2/4/2016 - Present        Obesity, Class I, BMI 30-34.9 ICD-10-CM: E66.9  ICD-9-CM: 278.00  2/4/2016 - Present        Hashimoto's thyroiditis ICD-10-CM: E06.3  ICD-9-CM: 245.2  12/28/2015 - Present        Hyperlipidemia with target LDL less than 70 ICD-10-CM: E78.5  ICD-9-CM: 272.4  7/13/2015 - Present        RESOLVED: [facility] ICD-10-CM: J41.0  ICD-9-CM: 491.0  5/4/2016 - 6/27/2017              Discharge Condition: Improved    Procedures: None         HPI: Deacon Arreaga a 67 y. o. female with hashimoto's thyroiditis, chronic bronchitis who presents to the ED x1 week of SOB, nasal/congestion, temp of 99.8, and productive cough (greenish/yellow), myalgia, and Headache along with BELL. Patient had minimal relief with Mucinex OTC. Patient is a Former tobacco user x30 pack yr hx with noting chronic bronchitis using atovent however not helpful. Patient initially presented to urgent care receiving nebs along with steriods 125mg solumedrol, and 02 however still hypoxic at 89% also noting to be flu positive. With these finding patient was recomemdned to come to ED. Patient denies cp, n/v, diarrhea, or any other acute complaints at ths time. Patient will be admtted for further eval.    Hospital Course: The patient was admitted to the hospital for further mgmt. She was treated with IV azithromycin, IV steroids and duonebs around the clock. Her condition improved daily. Tamiflu was not given due to her initial onset of symptoms being > 2 days from evaluation. Her hypoxia improved daily as well and she had saturation levels > 88% with physical therapy. She continued to feel much better by the day of discharge and was eager to get home. She was instructed to follow up with her PCP and should probably reestablish care with a pulmonologist given the emphysema seen on CT scan. She was given 3 more days of azithromycin for her suspected bronchitis and a steroid pack (that was prescribed at urgent care prior to coming in). The rest of the patient's chronic conditions were managed appropriately during their admission. They were medically stable at the time of discharge.     Visit Vitals    /63 (BP 1 Location: Right arm, BP Patient Position: Supine)    Pulse 70    Temp 98.3 °F (36.8 °C)    Resp 18    Ht 5' 8\" (1.727 m)    Wt 104.3 kg (230 lb)    SpO2 94%    BMI 34.97 kg/m2       Physical Exam at Discharge:  General Appearance: NAD, conversant  HENT: normocephalic/atraumatic, moist mucus membranes  Lungs: Decreased BS B/L although CTA with normal respiratory effort  CV: RRR, no m/r/g  Abdomen: soft, non-tender, normal bowel sounds  Extremities: no cyanosis, no peripheral edema  Neuro: moves all extremities, no focal deficits  Psych: appropriate affect, alert and oriented to person, place and time    Labs Prior to Discharge:  Labs: Results:       Chemistry Recent Labs      03/09/18 0355 03/08/18 0430 03/07/18   1345   GLU  126*  167*  130*   NA  140  139  140   K  4.4  3.8  3.9   CL  105  105  103   CO2  30  25  31   BUN  23*  14  16   CREA  0.75  0.71  0.90   CA  8.0*  7.8*  8.4*   AGAP  5  9  6   BUCR  31*  20  18   AP   --    --   98   TP   --    --   8.5*   ALB   --    --   3.9   GLOB   --    --   4.6*   AGRAT   --    --   0.8      CBC w/Diff Recent Labs      03/09/18 0355 03/08/18 0430 03/07/18   1345   WBC  14.4*  6.0  6.7   RBC  3.85*  3.83*  4.45   HGB  12.1  12.0  14.1   HCT  37.3  36.6  42.6   PLT  196  183  184   GRANS   --    --   92*   LYMPH   --    --   6*   EOS   --    --   0      Cardiac Enzymes Recent Labs      03/07/18   1345   CPK  74   CKND1  1.4      Coagulation No results for input(s): PTP, INR, APTT in the last 72 hours. No lab exists for component: INREXT    Lipid Panel Lab Results   Component Value Date/Time    Cholesterol, total 217 (H) 06/12/2017 11:07 AM    HDL Cholesterol 56 06/12/2017 11:07 AM    LDL, calculated 140 (H) 06/12/2017 11:07 AM    VLDL, calculated 21 06/12/2017 11:07 AM    Triglyceride 104 06/12/2017 11:07 AM    CHOL/HDL Ratio 3.5 11/15/2016 11:12 AM      BNP No results for input(s): BNPP in the last 72 hours.    Liver Enzymes Recent Labs      03/07/18   1345   TP  8.5*   ALB  3.9   AP  98   SGOT  18      Thyroid Studies Lab Results   Component Value Date/Time    TSH 4.900 (H) 01/30/2018 11:45 AM            Significant Imaging:  Cta Chest W Or W Wo Cont    Result Date: 3/7/2018  EXAM: CTA Chest INDICATION: Shortness of breath on exertion. Evaluation for pulmonary embolism is requested. COMPARISON: Same-day chest radiograph, no prior CT imaging of the chest is available for review. TECHNIQUE: Axial CT imaging from the thoracic inlet through the diaphragm with intravenous contrast utilizing CTA study for pulmonary artery evaluation. Coronal and sagittal MIP reformations were generated at a separate workstation. One or more dose reduction techniques were used on this CT: automated exposure control, adjustment of the mAs and/or kVp according to patient's size, and iterative reconstruction techniques. The specific techniques utilized on this CT exam have been documented in the patient's electronic medical record. _______________ FINDINGS: EXAM QUALITY: Overall exam quality is adequate. Pulmonary arterial enhancement is adequate. The breath hold is satisfactory. PULMONARY ARTERIES: There is no pulmonary embolism. The main pulmonary artery size is enlarged, measuring approximately 3.6 cm in size. MEDIASTINUM: Cardiac size is upper limits of normal. Thoracic aorta is normal in course and caliber with atherosclerotic calcification present diffusely. Small of fluid is present within the superior pericardial recess. No pericardial effusion. LYMPH NODES: No supraclavicular, axillary, mediastinal, or hilar adenopathy. AIRWAY: Central airways are patent. There is mild and diffuse lower lung predominant peribronchial thickening, with small foci of endobronchial mucoid impaction. LUNGS: Moderately severe upper lobe predominant centrilobular emphysema is present. Dependent atelectatic opacities are present at each lung base. As above, mild peribronchial thickening is noted diffusely, lower lobe predominant, with endobronchial mucoid impaction noted in the lower lobes. PLEURA: No pleural effusion or pneumothorax. UPPER ABDOMEN: A small hiatal hernia is present. . OTHER: No acute or aggressive osseous abnormalities identified. Multilevel thoracic spondylosis is present. _______________     IMPRESSION: 1. No evidence of pulmonary embolism. 2.  Enlargement of the main pulmonary artery, in keeping with underlying pulmonary arterial hypertension. 3. Moderately severe, upper lobe predominant centrilobular emphysema. 4. Relatively diffuse, lower lung predominant peribronchial thickening, with small foci of distal endobronchial mucoid impaction. 5. Small hiatal hernia. Xr Chest Port    Result Date: 3/7/2018  Portable chest 3/7/2018. History: Cough with headache and weakness for one week. Low-grade fever. Technique: A semierect portable radiograph of the chest was obtained. Comparison:  6/10/2011. Findings: The cardiomediastinal contours are unchanged. Patchy infiltrates are noted at both lung bases. The lungs are otherwise clear. There is no pneumothorax or pleural effusion. Impression: 1. Patchy infiltrates at both lung bases. Discharge Medications:     Current Discharge Medication List      START taking these medications    Details   azithromycin (ZITHROMAX) 250 mg tablet Take 1 Tab by mouth daily for 3 days. Qty: 3 Tab, Refills: 0         CONTINUE these medications which have NOT CHANGED    Details   oseltamivir (TAMIFLU) 75 mg capsule Take 1 Cap by mouth two (2) times a day for 5 days. Qty: 10 Cap, Refills: 0    Associated Diagnoses: Flu-like symptoms; Influenza      predniSONE (STERAPRED DS) 10 mg dose pack See administration instruction per 10mg dose pack  Qty: 21 Tab, Refills: 0    Associated Diagnoses: Productive cough; Shortness of breath; Bronchospasm; Hypoxemia      levothyroxine (SYNTHROID) 50 mcg tablet Take 1 Tab by mouth Daily (before breakfast).   Qty: 90 Tab, Refills: 3    Associated Diagnoses: Hashimoto's thyroiditis      !! ATROVENT HFA 17 mcg/actuation inhaler  TAKE 1 PUFF BY INHALATION EVERY 4 HOURS AS NEEDED FOR WHEEZING  Qty: 12.9 Inhaler, Refills: 3    Associated Diagnoses: Simple chronic bronchitis ([address]      ! ! ipratropium (ATROVENT HFA) 17 mcg/actuation inhaler Take 1 Puff by inhalation every four (4) hours as needed for Wheezing. Qty: 1 Inhaler, Refills: 3    Associated Diagnoses: Simple chronic bronchitis (HCC)      omega-3 fatty acids-vitamin e (FISH OIL) 1,000 mg cap Take 1 capsule by mouth.      calcium-cholecalciferol, D3, (CALTRATE 600+D) tablet Take 1 tablet by mouth daily. aspirin delayed-release 81 mg tablet Take  by mouth daily. !! - Potential duplicate medications found. Please discuss with provider. STOP taking these medications       albuterol-ipratropium (DUO-NEB) 2.5 mg-0.5 mg/3 ml nebu Comments:   Reason for Stopping:         methylPREDNISolone, PF, (SOLU-MEDROL) 125 mg/2 mL solr Comments:   Reason for Stopping:         albuterol-ipratropium (DUO-NEB) 2.5 mg-0.5 mg/3 ml nebu Comments:   Reason for Stopping:               Activity: Activity as tolerated    Diet: Resume previous diet    Wound Care: None needed    Follow-up:   Please follow up with your PCP within 7 days to discuss your recent hospitalization. Patient to arrange.   Pulmonology         Total time spent including time spent on final examination and discharge discussion, discharge documentation and records reviewed and medication reconciliation: > 30 minutes    Jhonny Pyle DO  Internal Medicine, Hospitalist  Pager: [phone]

## 2018-03-09 NOTE — ROUTINE PROCESS
1054 pt has discharge orders, car is parked in ED. Pt has Rx for zithromax, has prednisone at pharmacy from previous urgent care visit. 1430 pt discharged.

## 2018-03-09 NOTE — DISCHARGE INSTRUCTIONS
DISCHARGE SUMMARY from Nurse    PATIENT INSTRUCTIONS:    After general anesthesia or intravenous sedation, for 24 hours or while taking prescription Narcotics:  · Limit your activities  · Do not drive and operate hazardous machinery  · Do not make important personal or business decisions  · Do  not drink alcoholic beverages  · If you have not urinated within 8 hours after discharge, please contact your surgeon on call. Report the following to your surgeon:  · Excessive pain, swelling, redness or odor of or around the surgical area  · Temperature over 100.5  · Nausea and vomiting lasting longer than 4 hours or if unable to take medications  · Any signs of decreased circulation or nerve impairment to extremity: change in color, persistent  numbness, tingling, coldness or increase pain  · Any questions    What to do at Home:  Recommended activity: Activity as tolerated. If you experience any of the following symptoms increased shortness of breath or difficulty breathing, fever, chills, chest pain, please follow up with your primary care provider or local ED. *  Please give a list of your current medications to your Primary Care Provider. *  Please update this list whenever your medications are discontinued, doses are      changed, or new medications (including over-the-counter products) are added. *  Please carry medication information at all times in case of emergency situations. These are general instructions for a healthy lifestyle:    No smoking/ No tobacco products/ Avoid exposure to second hand smoke  Surgeon General's Warning:  Quitting smoking now greatly reduces serious risk to your health.     Obesity, smoking, and sedentary lifestyle greatly increases your risk for illness    A healthy diet, regular physical exercise & weight monitoring are important for maintaining a healthy lifestyle    You may be retaining fluid if you have a history of heart failure or if you experience any of the following symptoms:  Weight gain of 3 pounds or more overnight or 5 pounds in a week, increased swelling in our hands or feet or shortness of breath while lying flat in bed. Please call your doctor as soon as you notice any of these symptoms; do not wait until your next office visit. Recognize signs and symptoms of STROKE:    F-face looks uneven    A-arms unable to move or move unevenly    S-speech slurred or non-existent    T-time-call 911 as soon as signs and symptoms begin-DO NOT go       Back to bed or wait to see if you get better-TIME IS BRAIN. Warning Signs of HEART ATTACK     Call 911 if you have these symptoms:   Chest discomfort. Most heart attacks involve discomfort in the center of the chest that lasts more than a few minutes, or that goes away and comes back. It can feel like uncomfortable pressure, squeezing, fullness, or pain.  Discomfort in other areas of the upper body. Symptoms can include pain or discomfort in one or both arms, the back, neck, jaw, or stomach.  Shortness of breath with or without chest discomfort.  Other signs may include breaking out in a cold sweat, nausea, or lightheadedness. Don't wait more than five minutes to call 911 - MINUTES MATTER! Fast action can save your life. Calling 911 is almost always the fastest way to get lifesaving treatment. Emergency Medical Services staff can begin treatment when they arrive -- up to an hour sooner than if someone gets to the hospital by car. Patient armband removed and shredded. The discharge information has been reviewed with the patient. The patient verbalized understanding. Discharge medications reviewed with the patient and appropriate educational materials and side effects teaching were provided.   ___________________________________________________________________________________________________________________________________

## 2018-03-09 NOTE — PROGRESS NOTES
1930 Bedside and Verbal shift change report given to (oncoming nurse) by   RN (off going nurse). Report included the following information SBAR, Kardex, [facility] and Recent Results,.cardiac rhythm NSR      2024 Shift assessment completed,patient is a/o x 4,call bell within reach ,bed lowered and locked, no signs of distress noted      2153 Due medications given,no adverse reactions observed, well tolerated      0000 Shift reassessment completed,patient soundly sleeping, no changes in previous assessment      0400 Shift reassessment done,call bell within reach no signs of distress noted      0730 Bedside and Verbal shift change report given to Alexia Elizabeth RN (oncoming nurse) by Ketty Whipple RN (off going nurse).  Report included the following information SBAR, Kardex, [facility] and Recent Results

## 2018-03-09 NOTE — PROGRESS NOTES
Day #3 of azithromycin    Indication:  CAP / URI  Current regimen:  500 mg IV every 24 hours  Abx regimen: ceftriaxone discontinued    Recent Labs      18   0355  18   0430  18   1345   WBC  14.4*  6.0  6.7   CREA  0.75  0.71  0.90   BUN  23*  14  16     Est CrCl: 85 ml/min    Temp (24hrs), Av.7 °F (36.5 °C), Min:97 °F (36.1 °C), Max:98.3 °F (36.8 °C)      Plan: Change to azithromycin 500 mg PO every 24 hours as patient is tolerating diet and other oral medications     Thanks,  KIYA Alvarez

## 2018-03-09 NOTE — PROGRESS NOTES
Problem: Falls - Risk of  Goal: *Absence of Falls  Document Tyler Fall Risk and appropriate interventions in the flowsheet.    Outcome: Progressing Towards Goal  Fall Risk Interventions:            Medication Interventions: Patient to call before getting OOB, Teach patient to arise slowly

## 2018-03-09 NOTE — PROGRESS NOTES
Problem: Self Care Deficits Care Plan (Adult)  Goal: *Acute Goals and Plan of Care (Insert Text)  Outcome: Resolved/Met Date Met: 03/09/18  Occupational Therapy EVALUATION/discharge    Patient: Reed Barreto (49 y.o. female)  Date: 3/9/2018  Primary Diagnosis: COPD with exacerbation ([facility]        Precautions: (droplet)  PLOF: Pt was independent with basic self care tasks and functional mobility PTA. ASSESSMENT AND RECOMMENDATIONS:  Based on the objective data described below, the patient presents at baseline with regard to bed mobility, functional mobility & ADLs. Pt supine on arrival, completing breathing tx, no c/o pain. Independent in bed mobility; pt reports she maneuvers to bathroom independently. Good strength/AROM of BUEs. Pt educated on home safety, activity pacing & energy conservation techniques to ensure safe transition home, pt verbalized understanding. Left supine with HOB elevated, breakfast tray present, needs within reach. Pt at baseline, safe to transition home. Skilled occupational therapy is not indicated at this time. Discharge Recommendations: None  Further Equipment Recommendations for Discharge: shower chair     SUBJECTIVE:   Patient stated I know I sound bad but I'm feeling so much better. Red Stable    OBJECTIVE DATA SUMMARY:     Past Medical History:   Diagnosis Date    Acute bronchitis 12/28/2015    Advance directive discussed with patient 5/4/2016    Pt would like to appoint her son, Destini Castillo as her medical decision maker in the event that she can no longer do so. Phone number is [phone]. Her secondary person is her next door [facility] [employer]. Phone number is [phone]. If her death is imminent and medical treatment will not help her recover, pt does want life prolonging measures.   If her condition makes her unawar    Bilateral leg pain 10/22/2015    Elevated TSH 10/22/2015    Hashimoto's thyroiditis 12/28/2015    Hernia, abdominal     History of benign breast tumor     Menopause     Nicotine dependence in remission 2/4/2016    Obesity, Class I, BMI 30-34.9 2/4/2016    Prediabetes 10/22/2015    Simple chronic bronchitis ([facility] 5/4/2016    Skin lesion 2/4/2016    Varicose vein of leg      Past Surgical History:   Procedure Laterality Date    HX BREAST BIOPSY Right     Milk ducts removed    HX CYST INCISION AND DRAINAGE Right     35 y/o    HX HERNIA REPAIR      abdominal x2    HX HERNIA REPAIR  09/15/2016    ROBOTIC REPAIR OF RECURRENT INCARCERATED HERNIA WITH EXTENSIVE LYSIS OF ADHESIONS WITH PLACEMENT OF MESH     Barriers to Learning/Limitations: None  Compensate with: visual, verbal, tactile, kinesthetic cues/model    G CODES:  Self Care  Current  CI= 1-19%   Goal  CI= 1-19%   D/C  CI= 1-19%. The severity rating is based on the Other Functional Assessment, MMT, ROM    Eval Complexity: History: LOW Complexity : Brief history review ; Examination: LOW Complexity : 1-3 performance deficits relating to physical, cognitive , or psychosocial skils that result in activity limitations and / or participation restrictions ; Decision Making:LOW Complexity : No comorbidities that affect functional and no verbal or physical assistance needed to complete eval tasks      Prior Level of Function/Home Situation: Pt was independent with basic self care tasks and functional mobility PTA. Home Situation  Home Environment: Apartment  # Steps to Enter: 4  One/Two Story Residence: Two story  Living Alone: Yes  Support Systems: Family member(s), Friends \ neighbors  Patient Expects to be Discharged to[de-identified] Apartment  Current DME Used/Available at Home: None  Tub or Shower Type: Tub/Shower combination (no grab bars or shower chair)  [x]     Right hand dominant   []     Left hand dominant    Cognitive/Behavioral Status:  Neurologic State: Alert  Orientation Level: Oriented X4  Cognition: Appropriate decision making; Appropriate for age attention/concentration; Appropriate safety awareness; Follows commands  Safety/Judgement: Awareness of environment; Fall prevention     Skin: Intact (BUEs)  Edema: None noted (BUEs)    Vision/Perceptual:    Acuity: Able to read clock/calendar on wall without difficulty      Coordination:  Coordination: Within functional limits (BUEs)  Fine Motor Skills-Upper: Right Intact; Left Intact    Gross Motor Skills-Upper: Right Intact; Left Intact     Balance:  Sitting: Intact  Standing: Intact     Strength:  Strength: Within functional limits (BUEs: 5/5)    Range of Motion:  AROM: Within functional limits (BUEs: full shoulder/elbow flex)  PROM: Within functional limits (BUEs)    Functional Mobility and Transfers for ADLs:  Bed Mobility:  Supine to Sit: Independent  Sit to Supine: Independent    Transfers:  Sit to Stand: Independent   Toilet Transfer : Independent (per pt report; not assessed)    ADL Assessment:  Feeding: Independent  Oral Facial Hygiene/Grooming: Independent  Bathing: Modified independent  Upper Body Dressing: Independent  Lower Body Dressing: Independent  Toileting: Independent    Cognitive Retraining  Safety/Judgement: Awareness of environment; Fall prevention    Pain:  Pre-treatment pain level: 0/10  Post treatment pain level: 0/10  Pain Scale 1: Numeric (0 - 10)  Pain Intensity 1: 0    Activity Tolerance:  Fair+  Please refer to the flowsheet for vital signs taken during this treatment. After treatment:   []  Patient left in no apparent distress sitting up in chair  [x]  Patient left in no apparent distress in bed  [x]  Call bell left within reach  [x]  Nursing notified  []  Caregiver present  []  Bed alarm activated    COMMUNICATION/EDUCATION: Pt educated on role of OT, POC and home safety. She verbalized understanding. Communication/Collaboration:  [x]      Home safety education was provided and the patient/caregiver indicated understanding.   [x]      Patient/family have participated as able and agree with findings and recommendations. []      Patient is unable to participate in plan of care at this time.     Lily Ahumada MS OTR/L  Time Calculation: 8 mins

## 2018-03-09 NOTE — PROGRESS NOTES
Pt is agreeable for a [facility] visit and verified the contact information on the face sheet is correct. Referral sent to intake via [facility] and called to the [address] [place] [address] for f/u. Care Management Interventions  PCP Verified by CM:  Yes  Palliative Care Criteria Met (RRAT>21 & CHF Dx)?: No  Transition of Care Consult (CM Consult): [address]: Yes  Discharge Durable Medical Equipment: No  Physical Therapy Consult: No  Occupational Therapy Consult: No  Speech Therapy Consult: No  Current Support Network: Lives Alone  Confirm Follow Up Transport: Self  Plan discussed with Pt/Family/Caregiver: Yes  Freedom of Choice Offered: Yes  Discharge Location  Discharge Placement: Home with home health

## 2018-03-12 ENCOUNTER — HOME CARE VISIT (OUTPATIENT)
Dept: SCHEDULING | Facility: HOME HEALTH | Age: 73
End: 2018-03-12

## 2018-03-12 PROCEDURE — G0299 HHS/HOSPICE OF RN EA 15 MIN: HCPCS

## 2018-03-13 ENCOUNTER — PATIENT OUTREACH (OUTPATIENT)
Dept: FAMILY MEDICINE CLINIC | Age: 73
End: 2018-03-13

## 2018-03-13 LAB
BACTERIA SPEC CULT: NORMAL
BACTERIA SPEC CULT: NORMAL
SERVICE CMNT-IMP: NORMAL
SERVICE CMNT-IMP: NORMAL

## 2018-03-13 NOTE — PROGRESS NOTES
[place] [place] 10 Follow Up Note  -18 Admitted at [facility] for COPD  -18 Discharged to Home   -18 NN contact       Hospital Discharge Follow-Up        Nurse Navigator(NN) contacted the patient  by telephone to perform post hospital discharge assessment. Verified name and  with patient as identifiers. Provided introduction to self, and explanation of the Nurses Navigator role. The physician discharge summary was available at the time of outreach. Patient contacted within 2 business days of discharge. Inpatient RRAT score: 12    Top challenges reviewed with the provider:    1.)  Productive cough  2.) nausea   3.) COPD      Method of communication with provider :face to face prior to patient's examination      Reviewed discharge instructions and red flags with  patient who verbalizes understanding. patient given an opportunity to ask questions and does not have any further questions or concerns at this time. The patient agrees to contact the PCP office for questions related to their healthcare. NN provided contact information for future reference. Summary of patients top problems:  1. COPD  2. flu      Home Health orders at discharge: [address]: n/a  Date of initial visit: n/a    Durable Medical Equipment ordered/company: n/a  Durable Medical Equipment received: n/a    Barriers to care? lack of knowledge about disease, support system, transportation, utilization of services    Medication:   Medication reconciliation was preformed with patient, who verbalizes understanding of administration of home medications. There were no barriers to obtaining medications identified at this time.     New medications at discharge include zithromax  Does the patient have new prescription(s) to last until follow up with prescribing provider: yes  Prescription Medication total:  (pharmacy consult for polypharm needed?) no   Medication changes (dose adjustments or discontinued meds): Advance Care Planning:   Does patient have an Advance Directive:  not reviewed     PCP/Specialist follow up: Future Appointments  Date Time Provider Erick Corrina   3/15/2018 1:40 PM DO HUBER Potts   7/2/2018 9:40 AM Ernesto Uriostegui, DO HUBER TANNER              Goals      Develop action plan for Self-Management COPD (ie. management of red flags and COPD rescue kit at home). -pt will recognize s/s of exacerbation  -pt will identity which zone she is  -Pt will report red flags to PCP office       Knowledge and adherence of medication (ie. action, side effects, missed dose, etc.). Pt will take meds as prescribed                          This note will not be viewable in [employer].

## 2018-03-13 NOTE — PROGRESS NOTES
[place] [place] 10 Follow Up Note  -03/07/18 Admitted at [facility] for COPD  -03/09/18 Discharged to Home           Attempted to reach patient for post hosp f/u. Unable to reach. Left message on voicemail requesting call back. Contact info provided. Noted patient scheduled for PCP appt on 03/15/18 at 1:40pm    Will try to contact patient again for f/u              This note will not be viewable in [address]

## 2018-03-15 ENCOUNTER — OFFICE VISIT (OUTPATIENT)
Dept: FAMILY MEDICINE CLINIC | Age: 73
End: 2018-03-15

## 2018-03-15 VITALS
OXYGEN SATURATION: 92 % | RESPIRATION RATE: 16 BRPM | BODY MASS INDEX: 31.98 KG/M2 | DIASTOLIC BLOOD PRESSURE: 87 MMHG | HEIGHT: 68 IN | HEART RATE: 78 BPM | SYSTOLIC BLOOD PRESSURE: 155 MMHG | WEIGHT: 211 LBS | TEMPERATURE: 96.6 F

## 2018-03-15 DIAGNOSIS — J44.1 COPD WITH EXACERBATION (HCC): ICD-10-CM

## 2018-03-15 DIAGNOSIS — J01.00 ACUTE NON-RECURRENT MAXILLARY SINUSITIS: ICD-10-CM

## 2018-03-15 DIAGNOSIS — J43.2 CENTRILOBULAR EMPHYSEMA (HCC): Primary | ICD-10-CM

## 2018-03-15 RX ORDER — CEFDINIR 300 MG/1
300 CAPSULE ORAL 2 TIMES DAILY
Qty: 20 CAP | Refills: 0 | Status: SHIPPED | OUTPATIENT
Start: 2018-03-15 | End: 2018-03-25

## 2018-03-15 RX ORDER — BUDESONIDE AND FORMOTEROL FUMARATE DIHYDRATE 80; 4.5 UG/1; UG/1
2 AEROSOL RESPIRATORY (INHALATION) 2 TIMES DAILY
Qty: 1 INHALER | Refills: 2 | Status: SHIPPED | OUTPATIENT
Start: 2018-03-15 | End: 2018-07-02 | Stop reason: SDUPTHER

## 2018-03-15 NOTE — PROGRESS NOTES
1. Have you been to the ER, urgent care clinic since your last visit? Hospitalized since your last visit? 3/7/18, [place], flu, bronchitis    2. Have you seen or consulted any other health care providers outside of the [address] [place] [address] since your last visit? Include any pap smears or colon screening.  No

## 2018-03-15 NOTE — PATIENT INSTRUCTIONS
Learning About COPD  What is COPD? COPD is a lung disease that makes it hard to breathe. COPD stands for chronic obstructive pulmonary disease. It is caused by damage to the lungs over many years, usually from smoking. COPD is a mix of two diseases: chronic bronchitis and emphysema. Other things that may put you at risk for COPD include breathing chemical fumes, dust, or air pollution over a long period of time. Secondhand smoke is also bad. In chronic bronchitis, the airways that carry air to the lungs (bronchial tubes) get inflamed and make a lot of mucus. This can narrow or block the airways, making it hard for you to breathe. In emphysema, the air sacs in your lungs are damaged and lose their stretch. Less air gets in and out of your lungs, which makes you feel short of breath. What can you expect when you have COPD? COPD gets worse over time. You cannot undo the damage to your lungs. Over time, you may find that:  · You get short of breath even when you do simple things like get dressed or fix a meal.  · It is hard to eat or exercise. · You lose weight and feel weaker. But there are things you can do to prevent more damage and feel better. What are the symptoms? The main symptoms are:  · A cough that will not go away. · Mucus that comes up when you cough. · Shortness of breath that gets worse with activity. At times, your symptoms may suddenly flare up and get much worse. This is a called a COPD exacerbation (say \"egg-CORNEL--BAY-wes\"). When this happens, your usual symptoms quickly get worse and stay bad. This can be dangerous. You may have to go to the hospital.  How can you keep COPD from getting worse? Don't smoke. That is the best way to keep COPD from getting worse. If you already smoke, it is never too late to stop. If you need help quitting, talk to your doctor about stop-smoking programs and medicines. These can increase your chances of quitting for good.   You can do other things to keep COPD from getting worse:  · Avoid bad air. Air pollution, chemical fumes, and dust also can make COPD worse. · Get a flu shot every year. A shot may keep the flu from turning into something more serious, like pneumonia. A flu shot also may lower your chances of having a COPD flare-up. · Get a pneumococcal vaccine shot. If you have had one before, ask your doctor if you need another dose. How is COPD treated? COPD is treated with medicines and oxygen. You also can take steps at home to stay healthy and keep your COPD from getting worse. Medicines and oxygen therapy  · You may be taking medicines such as:  ¨ Bronchodilators. These help open your airways and make breathing easier. Bronchodilators are either short-acting (work for 6 to 9 hours) or long-acting (work for 24 hours). You inhale most bronchodilators, so they start to act quickly. Always carry your quick-relief inhaler with you in case you need it while you are away from home. ¨ Corticosteroids. These reduce airway inflammation. They come in pill or inhaled form. You must take these medicines every day for them to work well. · Take your medicines exactly as prescribed. Call your doctor if you think you are having a problem with your medicine. · Oxygen therapy boosts the amount of oxygen in your blood and helps you breathe easier. Use the flow rate your doctor has recommended, and do not change it without talking to your doctor first.  Other care at home  · If your doctor recommends it, get more exercise. Walking is a good choice. Bit by bit, increase the amount you walk every day. Try for at least 30 minutes on most days of the week. · Learn breathing methods-such as breathing through pursed lips-to help you become less short of breath. · If your doctor has not set you up with a pulmonary rehabilitation program, talk to him or her about whether rehab is right for you.  Rehab includes exercise programs, education about your disease and how to manage it, help with diet and other changes, and emotional support. · Eat regular, healthy meals. Use bronchodilators about 1 hour before you eat to make it easier to eat. Eat several small meals instead of three large ones. Drink beverages at the end of the meal. Avoid foods that are hard to chew. Follow-up care is a key part of your treatment and safety. Be sure to make and go to all appointments, and call your doctor if you are having problems. It's also a good idea to know your test results and keep a list of the medicines you take. Where can you learn more? Go to http://sujata-cara.info/. Enter V314 in the search box to learn more about \"Learning About COPD. \"  Current as of: May 12, 2017  Content Version: 11.4  © 7828-8044 [employer]. Care instructions adapted under license by [employer] (which disclaims liability or warranty for this information). If you have questions about a medical condition or this instruction, always ask your healthcare professional. [address] any warranty or liability for your use of this information.

## 2018-03-15 NOTE — PROGRESS NOTES
Subjective:     HPI:  Josey Lennon is a 67 y.o. female who presents to the office complaining of URI symptoms and to follow-up on recent hospital encounter. Hospital Encounter: Pt was admitted in [facility] on 3/7/2018 and discharged on 3/9/2018. She initially went to urgent care and received nebulizer treatment along with Steroids. She was also positive for flu. In the hospital, she was treated with IV azithromycin, IV steroids and duonebs around the clock. She was discharged with 3 days of azithromycin and steroid pack. Pt has completed antibiotics and is currently on steroid taper. She continues to have upper respiratory infection symptoms. Pt reports that she was following-up with Dr. Yao Howard regularly in the past but discontinued after she noticed false diagnosis in her chart. She reports that she was managing her symptoms by herself with Spirometer and with pulmonary rehab. Pt uses her Atrovent inhaler TID. URI: Pt complains of productive cough, congestion, post-nasal drip, nasally voice, and plugged hearing. She reports that she is blowing her nose every 10 minutes. She denies fever and chills. She has tried Flonase at home w/o any improvement. Current Outpatient Prescriptions   Medication Sig Dispense Refill    cefdinir (OMNICEF) 300 mg capsule Take 1 Cap by mouth two (2) times a day for 10 days. 20 Cap 0    budesonide-formoterol (SYMBICORT) 80-4.5 mcg/actuation HFAA Take 2 Puffs by inhalation two (2) times a day. 1 Inhaler 2    levothyroxine (SYNTHROID) 50 mcg tablet Take 1 Tab by mouth Daily (before breakfast). 90 Tab 3    ATROVENT HFA 17 mcg/actuation inhaler  TAKE 1 PUFF BY INHALATION EVERY 4 HOURS AS NEEDED FOR WHEEZING 12.9 Inhaler 3    omega-3 fatty acids-vitamin e (FISH OIL) 1,000 mg cap Take 1 capsule by mouth.  calcium-cholecalciferol, D3, (CALTRATE 600+D) tablet Take 1 tablet by mouth daily.  aspirin delayed-release 81 mg tablet Take  by mouth daily.           No Known Allergies    Past Medical History:   Diagnosis Date    Acute bronchitis 12/28/2015    Advance directive discussed with patient 5/4/2016    Pt would like to appoint her son, Patrick Velazquez as her medical decision maker in the event that she can no longer do so. Phone number is [phone]. Her secondary person is her next door NELSON jones Laura Sapiens. Phone number is [phone]. If her death is imminent and medical treatment will not help her recover, pt does want life prolonging measures. If her condition makes her unawar    Bilateral leg pain 10/22/2015    Elevated TSH 10/22/2015    Hashimoto's thyroiditis 12/28/2015    Hernia, abdominal     History of benign breast tumor     Menopause     Nicotine dependence in remission 2/4/2016    Obesity, Class I, BMI 30-34.9 2/4/2016    Prediabetes 10/22/2015    Simple chronic bronchitis ([address] 5/4/2016    Skin lesion 2/4/2016    Varicose vein of leg         Past Surgical History:   Procedure Laterality Date    HX BREAST BIOPSY Right     Milk ducts removed    HX CYST INCISION AND DRAINAGE Right     37 y/o    HX HERNIA REPAIR      abdominal x2    HX HERNIA REPAIR  09/15/2016    ROBOTIC REPAIR OF RECURRENT INCARCERATED HERNIA WITH EXTENSIVE LYSIS OF ADHESIONS WITH PLACEMENT OF MESH       Family History   Problem Relation Age of Onset    Breast Cancer Mother 38     36s    Lung Disease Mother     Elevated Lipids Mother     Stroke Father     Diabetes Paternal Grandfather        Social History     Social History    Marital status: SINGLE     Spouse name: N/A    Number of children: N/A    Years of education: N/A     Occupational History    Not on file.      Social History Main Topics    Smoking status: Former Smoker     Packs/day: 1.00     Years: 30.00     Quit date: 1/1/1992    Smokeless tobacco: Never Used    Alcohol use 1.2 oz/week     2 Glasses of wine per week      Comment: Occasionally     Drug use: No    Sexual activity: Not Currently     Other Topics Concern    Not on file     Social History Narrative       REVIEW OF SYSTEM:  Review of Systems   Constitutional: Negative for chills and fever. HENT: Positive for congestion. Eyes: Negative for blurred vision. Respiratory: Positive for cough, sputum production and shortness of breath. Negative for wheezing. Cardiovascular: Negative for chest pain, palpitations and leg swelling. Gastrointestinal: Negative for constipation, diarrhea, nausea and vomiting. Musculoskeletal: Negative for joint pain. Neurological: Negative for headaches. Objective:     Visit Vitals    /87 (BP 1 Location: Right arm, BP Patient Position: Sitting)    Pulse 78    Temp 96.6 °F (35.9 °C) (Oral)    Resp 16    Ht 5' 8\" (1.727 m)    Wt 211 lb (95.7 kg)    SpO2 92%    BMI 32.08 kg/m2       PHYSICAL EXAM:  Physical Exam   Constitutional: She is oriented to person, place, and time and well-developed, well-nourished, and in no distress. Speech with nasal quality    HENT:   Right Ear: Tympanic membrane, external ear and ear canal normal.   Left Ear: Tympanic membrane, external ear and ear canal normal.   Nose: Right sinus exhibits frontal sinus tenderness. Left sinus exhibits frontal sinus tenderness. Mouth/Throat: Oropharynx is clear and moist.   Eyes: Pupils are equal, round, and reactive to light. Neck: Normal range of motion. Neck supple. No thyromegaly present. Cardiovascular: Normal rate, regular rhythm, normal heart sounds and intact distal pulses. No murmur heard. Pulmonary/Chest: Effort normal and breath sounds normal. She has no wheezes. Neurological: She is alert and oriented to person, place, and time. GCS score is 15. Skin: Skin is warm and dry. Vitals reviewed. Assessment/Plan:       ICD-10-CM ICD-9-CM    1. Centrilobular emphysema (HCC) J43.2 492.8 REFERRAL TO PULMONARY DISEASE      budesonide-formoterol (SYMBICORT) 80-4.5 mcg/actuation HFAA   2.  COPD with exacerbation ([facility]) J44.1 491.21 REFERRAL TO PULMONARY DISEASE      budesonide-formoterol (SYMBICORT) 80-4.5 mcg/actuation HFAA   3. Acute non-recurrent maxillary sinusitis J01.00 461.0 cefdinir (OMNICEF) 300 mg capsule     Patient given opportunity to ask questions. Questions answered. Advised patient to re-establish care with a pulmonologist due to past medical history. Will treat patient with Omnicef. Prescribed Symbicort inhaler after considering affordability. Patient understands plan of care. Follow-up Disposition:  Return in about 2 weeks (around 3/29/2018). Written by Rafael Lennon, as dictated by DO. GRETCHEN Bazan, Dr. Starla Jackman, confirm that all documentation is accurate.

## 2018-03-15 NOTE — MR AVS SNAPSHOT
303 [facility] 
 
 
 [address] [facility] 83 [id number] [phone] Patient: Darlene Saravia MRN: [id number] YPS:[id number] Visit Information Date & Time Provider Department Dept. Phone Encounter #  
 3/15/2018  1:40 PM Dilip Boateng, 6230 [facility] [phone] Follow-up Instructions Return in about 2 weeks (around 3/29/2018). Your Appointments 7/2/2018  9:40 AM  
Follow [place] [address] [address]) Appt Note: Return in about 5 months (around 6/30/2018). [address] [facility] 83 222 [facility]  
  
   
 [address] [place] [address] [address] Upcoming Health Maintenance Date Due  
 GLAUCOMA SCREENING Q2Y 7/7/2010 MEDICARE YEARLY EXAM 6/28/2018 BREAST CANCER SCRN MAMMOGRAM 6/12/2019 DTaP/Tdap/Td series (2 - Td) 6/23/2025 COLONOSCOPY 4/20/2026 Allergies as of 3/15/2018  Review Complete On: 3/15/2018 By: Dilip Boateng DO No Known Allergies Current Immunizations  Reviewed on 6/27/2017 Name Date Influenza High Dose Vaccine PF 9/27/2017 Pneumococcal Conjugate (PCV-13) 11/15/2016 Pneumococcal Polysaccharide (PPSV-23) 10/15/2012 Tdap 6/23/2015 Not reviewed this visit You Were Diagnosed With   
  
 Codes Comments Centrilobular emphysema ([facility] [address]    -  Primary ICD-10-CM: J43.2 ICD-9-CM: 492.8 COPD with exacerbation ([facility] [address]     ICD-10-CM: J44.1 ICD-9-CM: 491.21 Acute non-recurrent maxillary sinusitis     ICD-10-CM: J01.00 ICD-9-CM: 461.0 Vitals BP Pulse Temp Resp Height(growth percentile) Weight(growth percentile) 155/87 (BP 1 Location: Right arm, BP Patient Position: Sitting) 78 96.6 °F (35.9 °C) (Oral) 16 5' 8\" (1.727 m) 211 lb (95.7 kg) SpO2 BMI OB Status Smoking Status 92% 32.08 kg/m2 Postmenopausal Former Smoker BMI and BSA Data Body Mass Index Body Surface Area 32.08 kg/m 2 2.14 m 2 [facility] Phone [phone] Your Updated Medication List  
  
   
This list is accurate as of 3/15/18  3:51 PM.  Always use your most recent med list.  
  
  
  
  
 aspirin delayed-release 81 mg tablet Take  by mouth daily. ATROVENT HFA 17 mcg/actuation inhaler Generic drug:  ipratropium TAKE 1 PUFF BY INHALATION EVERY 4 HOURS AS NEEDED FOR WHEEZING  
  
 budesonide-formoterol 80-4.5 mcg/actuation Hfaa Commonly known as:  SYMBICORT Take 2 Puffs by inhalation two (2) times a day. calcium-cholecalciferol (D3) tablet Commonly known as:  CALTRATE 600+D Take 1 tablet by mouth daily. cefdinir 300 mg capsule Commonly known as:  OMNICEF Take 1 Cap by mouth two (2) times a day for 10 days. FISH OIL 1,000 mg Cap Generic drug:  omega-3 fatty acids-vitamin e Take 1 capsule by mouth.  
  
 levothyroxine 50 mcg tablet Commonly known as:  SYNTHROID Take 1 Tab by mouth Daily (before breakfast). Prescriptions Sent to Pharmacy Refills  
 cefdinir (OMNICEF) 300 mg capsule 0 Sig: Take 1 Cap by mouth two (2) times a day for 10 days. Class: Normal  
 [facility]acy: [facility] [place] Ph #: [phone] Route: Oral  
 budesonide-formoterol (SYMBICORT) 80-4.5 mcg/actuation HFAA 2 Sig: Take 2 Puffs by inhalation two (2) times a day. Class: Normal  
 Pharmacy: [facility] [address] [place] Ph #: [phone] Route: Inhalation We Performed the Following REFERRAL TO PULMONARY DISEASE [HTP08 Custom] Follow-up Instructions Return in about 2 weeks (around 3/29/2018). Referral Information Referral ID Referred By Referred To [id number] Ifeanyi CHAVEZ Not Available Visits Status Start Date End Date 1 Open 3/15/18 3/15/19 If your referral has a status of pending review or denied, additional information will be sent to support the outcome of this decision. Patient Instructions Learning About COPD What is COPD? COPD is a lung disease that makes it hard to breathe. COPD stands for chronic obstructive pulmonary disease. It is caused by damage to the lungs over many years, usually from smoking. COPD is a mix of two diseases: chronic bronchitis and emphysema. Other things that may put you at risk for COPD include breathing chemical fumes, dust, or air pollution over a long period of time. Secondhand smoke is also bad. In chronic bronchitis, the airways that carry air to the lungs (bronchial tubes) get inflamed and make a lot of mucus. This can narrow or block the airways, making it hard for you to breathe. In emphysema, the air sacs in your lungs are damaged and lose their stretch. Less air gets in and out of your lungs, which makes you feel short of breath. What can you expect when you have COPD? COPD gets worse over time. You cannot undo the damage to your lungs. Over time, you may find that: 
· You get short of breath even when you do simple things like get dressed or fix a meal. 
· It is hard to eat or exercise. · You lose weight and feel weaker. But there are things you can do to prevent more damage and feel better. What are the symptoms? The main symptoms are: · A cough that will not go away. · Mucus that comes up when you cough. · Shortness of breath that gets worse with activity. At times, your symptoms may suddenly flare up and get much worse. This is a called a COPD exacerbation (say \"egg-CORNEL--BAY-wes\"). When this happens, your usual symptoms quickly get worse and stay bad. This can be dangerous. You may have to go to the hospital. 
How can you keep COPD from getting worse? Don't smoke. That is the best way to keep COPD from getting worse. If you already smoke, it is never too late to stop. If you need help quitting, talk to your doctor about stop-smoking programs and medicines. These can increase your chances of quitting for good. You can do other things to keep COPD from getting worse: · Avoid bad air. Air pollution, chemical fumes, and dust also can make COPD worse. · Get a flu shot every year. A shot may keep the flu from turning into something more serious, like pneumonia. A flu shot also may lower your chances of having a COPD flare-up. · Get a pneumococcal vaccine shot. If you have had one before, ask your doctor if you need another dose. How is COPD treated? COPD is treated with medicines and oxygen. You also can take steps at home to stay healthy and keep your COPD from getting worse. Medicines and oxygen therapy · You may be taking medicines such as: ¨ Bronchodilators. These help open your airways and make breathing easier. Bronchodilators are either short-acting (work for 6 to 9 hours) or long-acting (work for 24 hours). You inhale most bronchodilators, so they start to act quickly. Always carry your quick-relief inhaler with you in case you need it while you are away from home. ¨ Corticosteroids. These reduce airway inflammation. They come in pill or inhaled form. You must take these medicines every day for them to work well. · Take your medicines exactly as prescribed. Call your doctor if you think you are having a problem with your medicine. · Oxygen therapy boosts the amount of oxygen in your blood and helps you breathe easier. Use the flow rate your doctor has recommended, and do not change it without talking to your doctor first. 
Other care at home · If your doctor recommends it, get more exercise. Walking is a good choice. Bit by bit, increase the amount you walk every day. Try for at least 30 minutes on most days of the week. · Learn breathing methods-such as breathing through pursed lips-to help you become less short of breath. · If your doctor has not set you up with a pulmonary rehabilitation program, talk to him or her about whether rehab is right for you. Rehab includes exercise programs, education about your disease and how to manage it, help with diet and other changes, and emotional support. · Eat regular, healthy meals. Use bronchodilators about 1 hour before you eat to make it easier to eat. Eat several small meals instead of three large ones. Drink beverages at the end of the meal. Avoid foods that are hard to chew. Follow-up care is a key part of your treatment and safety. Be sure to make and go to all appointments, and call your doctor if you are having problems. It's also a good idea to know your test results and keep a list of the medicines you take. Where can you learn more? Go to http://sujata-cara.info/. Enter V314 in the search box to learn more about \"Learning About COPD. \" 
Current as of: May 12, 2017 Content Version: 11.4 © 5148-8810 Nemedia. Care instructions adapted under license by MobiApp[employer] (which disclaims liability or warranty for this information). If you have questions about a medical condition or this instruction, always ask your healthcare professional. [address] any warranty or liability for your use of this information. Introducing [address]! Dear Jo Petty: Thank you for requesting a wywy account. Our records indicate that you already have an active wywy account. You can access your account anytime at [email or web address] Did you know that you can access your hospital and ER discharge instructions at any time in wywy? You can also review all of your test results from your hospital stay or ER visit. Additional Information If you have questions, please visit the Frequently Asked Questions section of the JobSlothart website at [email or web address] Remember, Optyn is NOT to be used for urgent needs. For medical emergencies, dial 911. Now available from your iPhone and Android! Please provide this summary of care documentation to your next provider. Your primary care clinician is listed as Nicki Ochoa. If you have any questions after today's visit, please call [phone].

## 2018-03-21 NOTE — ANCILLARY DISCHARGE INSTRUCTIONS
[facility]  Discharge Phone Call       After-Care Discharge Phone Call Questions:    Were you able to get your prescriptions filled? Comment:      [x] Yes  []No    Comment if answer is \"No\"   Are you taking your medication(s) as your doctor ordered? Do you understand the purpose of your medications? Comment:    [x] Yes  []No    Comment if answer is \"No\"   Are you taking any other medications that are not on the list?  Comment:      [x] Yes  []No    Comment if answer is \"Yes\"   Do you have any questions about your medications?  n Are you aware of potential side effects?  y   Comment:    [x] Yes  [x]No    Comment if answer is \"Yes\"   Did you make your follow-up appointments (if the hospital did not do this before  discharge)? Comment:    [x] Yes  []No    Comment if answer is \"No\"   Is there any reason you might not be able to keep your follow-up appointments? Comment:     [] Yes  [x]No    Comment if answer is \"Yes\"   Do you have any questions about your care plan? n  Are you aware of what health problems to be alert for?  y   Comment:    [x] Yes  [x]No    Comment if answer is \"Yes\"   Do you have a good understanding of how you should manage your health? Comment:    [x] Yes  []No    Comment if answer is \"Yes\"   Do you know which symptoms to watch for that would mean you would need to call your doctor right away? Comment:      [x] Yes  []No    Comment if answer is \"No\"   Do you have any questions about the follow up process or any instructions that we have provided? Comment:    [] Yes  [x]No    Comment if answer is \"Yes\"   Did staff take your preferences into account?         [x] Yes  []No    Comment if answer is \"Yes\"

## 2018-03-29 ENCOUNTER — OFFICE VISIT (OUTPATIENT)
Dept: FAMILY MEDICINE CLINIC | Age: 73
End: 2018-03-29

## 2018-03-29 VITALS
RESPIRATION RATE: 16 BRPM | HEART RATE: 83 BPM | WEIGHT: 210 LBS | TEMPERATURE: 97.4 F | BODY MASS INDEX: 31.83 KG/M2 | OXYGEN SATURATION: 92 % | SYSTOLIC BLOOD PRESSURE: 138 MMHG | HEIGHT: 68 IN | DIASTOLIC BLOOD PRESSURE: 81 MMHG

## 2018-03-29 DIAGNOSIS — J01.40 ACUTE NON-RECURRENT PANSINUSITIS: Primary | ICD-10-CM

## 2018-03-29 NOTE — PROGRESS NOTES
1. Have you been to the ER, urgent care clinic since your last visit? Hospitalized since your last visit? No    2. Have you seen or consulted any other health care providers outside of the [address] [place] [address] since your last visit? Include any pap smears or colon screening. No     Patient presents in office today for routine care.   Patient concerns: follow up for COPD

## 2018-03-29 NOTE — PROGRESS NOTES
Subjective:     HPI:  Jesus Mckeon is a 67 y.o. female who presents to the office for routine for treatment for sinus infection and COPD. COPD: Pt reports that she has been taking Atrovent TID and Symbicort BID her recent hospital encounter earlier this month. She denies SOB and reports that she feels good. Pt was previously using Atrovent when she felt stuffy or was wheezing. She reports that upper respiratory infection has been resolved and she is no longer taking mucinex. She states she does not want to see the pulmonologist at this time. She feels she is stable on her current therapy. Will consider in the future. Sinusitis: patient completed antibiotic therapy and reports she is feeling much better. Current Outpatient Prescriptions   Medication Sig Dispense Refill    budesonide-formoterol (SYMBICORT) 80-4.5 mcg/actuation HFAA Take 2 Puffs by inhalation two (2) times a day. 1 Inhaler 2    levothyroxine (SYNTHROID) 50 mcg tablet Take 1 Tab by mouth Daily (before breakfast). 90 Tab 3    ATROVENT HFA 17 mcg/actuation inhaler  TAKE 1 PUFF BY INHALATION EVERY 4 HOURS AS NEEDED FOR WHEEZING 12.9 Inhaler 3    omega-3 fatty acids-vitamin e (FISH OIL) 1,000 mg cap Take 1 capsule by mouth.  calcium-cholecalciferol, D3, (CALTRATE 600+D) tablet Take 1 tablet by mouth daily.  aspirin delayed-release 81 mg tablet Take  by mouth daily. No Known Allergies    Past Medical History:   Diagnosis Date    Acute bronchitis 12/28/2015    Advance directive discussed with patient 5/4/2016    Pt would like to appoint her son, Ravinder Kennedy as her medical decision maker in the event that she can no longer do so. Phone number is [phone]. Her secondary person [facility] Phone number is [phone]. If her death is imminent and medical treatment will not help her recover, pt does want life prolonging measures.   If her condition makes her unawar    Bilateral leg pain 10/22/2015    Elevated TSH 10/22/2015    Hashimoto's thyroiditis 12/28/2015    Hernia, abdominal     History of benign breast tumor     Menopause     Nicotine dependence in remission 2/4/2016    Obesity, Class I, BMI 30-34.9 2/4/2016    Prediabetes 10/22/2015    Simple chronic bronchitis ([address] 5/4/2016    Skin lesion 2/4/2016    Varicose vein of leg         Past Surgical History:   Procedure Laterality Date    HX BREAST BIOPSY Right     Milk ducts removed    HX CYST INCISION AND DRAINAGE Right     37 y/o    HX HERNIA REPAIR      abdominal x2    HX HERNIA REPAIR  09/15/2016    ROBOTIC REPAIR OF RECURRENT INCARCERATED HERNIA WITH EXTENSIVE LYSIS OF ADHESIONS WITH PLACEMENT OF MESH       Family History   Problem Relation Age of Onset    Breast Cancer Mother 38     36s    Lung Disease Mother     Elevated Lipids Mother     Stroke Father     Diabetes Paternal Grandfather        Social History     Social History    Marital status: SINGLE     Spouse name: N/A    Number of children: N/A    Years of education: N/A     Occupational History    Not on file. Social History Main Topics    Smoking status: Former Smoker     Packs/day: 1.00     Years: 30.00     Quit date: 1/1/1992    Smokeless tobacco: Never Used    Alcohol use 1.2 oz/week     2 Glasses of wine per week      Comment: Occasionally     Drug use: No    Sexual activity: Not Currently     Other Topics Concern    Not on file     Social History Narrative       REVIEW OF SYSTEM:  Review of Systems   Constitutional: Negative for chills and fever. Eyes: Negative for blurred vision. Respiratory: Negative for shortness of breath. Cardiovascular: Negative for chest pain, palpitations and leg swelling. Gastrointestinal: Negative for constipation, diarrhea, nausea and vomiting. Musculoskeletal: Negative for joint pain. Neurological: Negative for headaches.        Objective:     Visit Vitals    /81 (BP 1 Location: Right arm, BP Patient Position: Sitting)    Pulse 83    Temp 97.4 °F (36.3 °C) (Oral)    Resp 16    Ht 5' 8\" (1.727 m)    Wt 210 lb (95.3 kg)    SpO2 92%    BMI 31.93 kg/m2       PHYSICAL EXAM:  Physical Exam   Constitutional: She is oriented to person, place, and time and well-developed, well-nourished, and in no distress. HENT:   Right Ear: Tympanic membrane, external ear and ear canal normal.   Left Ear: Tympanic membrane, external ear and ear canal normal.   Nose: Nose normal.   Mouth/Throat: Oropharynx is clear and moist.   Eyes: Pupils are equal, round, and reactive to light. Neck: Normal range of motion. Neck supple. No thyromegaly present. Cardiovascular: Normal rate, regular rhythm, normal heart sounds and intact distal pulses. No murmur heard. Pulmonary/Chest: Effort normal and breath sounds normal. She has no wheezes. Neurological: She is alert and oriented to person, place, and time. GCS score is 15. Skin: Skin is warm and dry. Vitals reviewed. Assessment/Plan:       ICD-10-CM ICD-9-CM    1. Acute non-recurrent pansinusitis J01.40 461.8      Patient given opportunity to ask questions. Questions answered. Patient understands plan of care. Follow-up Disposition:  Return for as scheduled. .    Written by Angela Kahn, as dictated by Remington Obrien DO.    I, Dr. Remington Obrien, confirm that all documentation is accurate.

## 2018-03-29 NOTE — MR AVS SNAPSHOT
303 [facility] 
 
 
 [address] [facility] 83 [address] 
[phone] Patient: Krystal Signs MRN: [id number] [facility]:9/2/2057 Visit Information Date & Time Provider Department Dept. Phone Encounter #  
 3/29/2018  1:40 PM Trudy Shay[address]  [id number] Follow-up Instructions Return for as scheduled. .  
  
Your Appointments 7/2/2018  9:40 AM  
Follow Up with [place] Uri[place]egui DO [address] [address]) Appt Note: Return in about 5 months (around 6/30/2018). [address] [facility] 83 222 [facility]  
  
   
 [address] [facility] Upcoming Health Maintenance Date Due  
 GLAUCOMA SCREENING Q2Y 7/7/2010 MEDICARE YEARLY EXAM 6/28/2018 BREAST CANCER SCRN MAMMOGRAM 6/12/2019 DTaP/Tdap/Td series (2 - Td) 6/23/2025 COLONOSCOPY 4/20/2026 Allergies as of 3/29/2018  Review Complete On: 3/29/2018 By: Trudy Shay DO No Known Allergies Current Immunizations  Reviewed on 6/27/2017 Name Date Influenza High Dose Vaccine PF 9/27/2017 Pneumococcal Conjugate (PCV-13) 11/15/2016 Pneumococcal Polysaccharide (PPSV-23) 10/15/2012 Tdap 6/23/2015 Not reviewed this visit Vitals BP Pulse Temp Resp Height(growth percentile) Weight(growth percentile) 138/81 (BP 1 Location: Right arm, BP Patient Position: Sitting) 83 97.4 °F (36.3 °C) (Oral) 16 5' 8\" (1.727 m) 210 lb (95.3 kg) SpO2 BMI OB Status Smoking Status 92% 31.93 kg/m2 Postmenopausal Former Smoker BMI and BSA Data Body Mass Index Body Surface Area  
 31.93 kg/m 2 2.14 m 2 Preferred Pharmacy Pharmacy Name Phone [phone] [facility] [phone] Your Updated Medication List  
  
   
 This list is accurate as of 3/29/18  2:25 PM.  Always use your most recent med list.  
  
  
  
  
 aspirin delayed-release 81 mg tablet Take  by mouth daily. ATROVENT HFA 17 mcg/actuation inhaler Generic drug:  ipratropium TAKE 1 PUFF BY INHALATION EVERY 4 HOURS AS NEEDED FOR WHEEZING  
  
 budesonide-formoterol 80-4.5 mcg/actuation Hfaa Commonly known as:  SYMBICORT Take 2 Puffs by inhalation two (2) times a day. calcium-cholecalciferol (D3) tablet Commonly known as:  CALTRATE 600+D Take 1 tablet by mouth daily. FISH OIL 1,000 mg Cap Generic drug:  omega-3 fatty acids-vitamin e Take 1 capsule by mouth.  
  
 levothyroxine 50 mcg tablet Commonly known as:  SYNTHROID Take 1 Tab by mouth Daily (before breakfast). Follow-up Instructions Return for as scheduled. .  
  
  
Introducing [facility]! Dear David Hazard: Thank you for requesting a StyleFactory account. Our records indicate that you already have an active StyleFactory account. You can access your account anytime at [email or web address] Did you know that you can access your hospital and ER discharge instructions at any time in StyleFactory? You can also review all of your test results from your hospital stay or ER visit. Additional Information If you have questions, please visit the Frequently Asked Questions section of the StyleFactory website at [email or web address] Remember, StyleFactory is NOT to be used for urgent needs. For medical emergencies, dial 911. Now available from your iPhone and Android! Please provide this summary of care documentation to your next provider. Your primary care clinician is listed as Jennifer Gaona. If you have any questions after today's visit, please call [phone].

## 2018-07-02 ENCOUNTER — HOSPITAL ENCOUNTER (OUTPATIENT)
Dept: LAB | Age: 73
Discharge: HOME | End: 2018-07-02
Payer: MEDICARE

## 2018-07-02 ENCOUNTER — HOSPITAL ENCOUNTER (OUTPATIENT)
Dept: MAMMOGRAPHY | Age: 73
Discharge: HOME | End: 2018-07-02
Attending: FAMILY MEDICINE
Payer: MEDICARE

## 2018-07-02 ENCOUNTER — OFFICE VISIT (OUTPATIENT)
Dept: FAMILY MEDICINE CLINIC | Age: 73
End: 2018-07-02

## 2018-07-02 ENCOUNTER — HOSPITAL ENCOUNTER (OUTPATIENT)
Dept: GENERAL RADIOLOGY | Age: 73
Discharge: HOME | End: 2018-07-02
Attending: FAMILY MEDICINE
Payer: MEDICARE

## 2018-07-02 VITALS
WEIGHT: 216.4 LBS | HEIGHT: 68 IN | TEMPERATURE: 97.2 F | BODY MASS INDEX: 32.8 KG/M2 | DIASTOLIC BLOOD PRESSURE: 72 MMHG | OXYGEN SATURATION: 95 % | SYSTOLIC BLOOD PRESSURE: 131 MMHG | HEART RATE: 72 BPM | RESPIRATION RATE: 18 BRPM

## 2018-07-02 DIAGNOSIS — Z13.220 SCREENING CHOLESTEROL LEVEL: ICD-10-CM

## 2018-07-02 DIAGNOSIS — J43.2 CENTRILOBULAR EMPHYSEMA (HCC): ICD-10-CM

## 2018-07-02 DIAGNOSIS — J41.0 SIMPLE CHRONIC BRONCHITIS (HCC): ICD-10-CM

## 2018-07-02 DIAGNOSIS — Z78.0 POST-MENOPAUSAL: ICD-10-CM

## 2018-07-02 DIAGNOSIS — Z13.5 SCREENING FOR GLAUCOMA: ICD-10-CM

## 2018-07-02 DIAGNOSIS — Z12.39 SCREENING FOR BREAST CANCER: ICD-10-CM

## 2018-07-02 DIAGNOSIS — E06.3 HASHIMOTO'S THYROIDITIS: ICD-10-CM

## 2018-07-02 DIAGNOSIS — J44.1 COPD WITH EXACERBATION (HCC): ICD-10-CM

## 2018-07-02 DIAGNOSIS — Z00.00 MEDICARE ANNUAL WELLNESS VISIT, SUBSEQUENT: Primary | ICD-10-CM

## 2018-07-02 LAB
ALBUMIN SERPL-MCNC: 3.7 G/DL (ref 3.4–5)
ALBUMIN/GLOB SERPL: 0.9 {RATIO} (ref 0.8–1.7)
ALP SERPL-CCNC: 85 U/L (ref 45–117)
ALT SERPL-CCNC: 18 U/L (ref 13–56)
ANION GAP SERPL CALC-SCNC: 8 MMOL/L (ref 3–18)
AST SERPL-CCNC: 19 U/L (ref 15–37)
BILIRUB SERPL-MCNC: 0.4 MG/DL (ref 0.2–1)
BUN SERPL-MCNC: 18 MG/DL (ref 7–18)
BUN/CREAT SERPL: 21 (ref 12–20)
CALCIUM SERPL-MCNC: 8.4 MG/DL (ref 8.5–10.1)
CHLORIDE SERPL-SCNC: 103 MMOL/L (ref 100–108)
CHOLEST SERPL-MCNC: 213 MG/DL
CO2 SERPL-SCNC: 29 MMOL/L (ref 21–32)
CREAT SERPL-MCNC: 0.87 MG/DL (ref 0.6–1.3)
GLOBULIN SER CALC-MCNC: 4.1 G/DL (ref 2–4)
GLUCOSE SERPL-MCNC: 84 MG/DL (ref 74–99)
HDLC SERPL-MCNC: 58 MG/DL (ref 40–60)
HDLC SERPL: 3.7 {RATIO} (ref 0–5)
LDLC SERPL CALC-MCNC: 139 MG/DL (ref 0–100)
LIPID PROFILE,FLP: ABNORMAL
POTASSIUM SERPL-SCNC: 4.1 MMOL/L (ref 3.5–5.5)
PROT SERPL-MCNC: 7.8 G/DL (ref 6.4–8.2)
SODIUM SERPL-SCNC: 140 MMOL/L (ref 136–145)
TRIGL SERPL-MCNC: 80 MG/DL (ref ?–150)
TSH SERPL DL<=0.05 MIU/L-ACNC: 3.86 UIU/ML (ref 0.36–3.74)
VLDLC SERPL CALC-MCNC: 16 MG/DL

## 2018-07-02 PROCEDURE — 80053 COMPREHEN METABOLIC PANEL: CPT | Performed by: FAMILY MEDICINE

## 2018-07-02 PROCEDURE — 77063 BREAST TOMOSYNTHESIS BI: CPT

## 2018-07-02 PROCEDURE — 80061 LIPID PANEL: CPT | Performed by: FAMILY MEDICINE

## 2018-07-02 PROCEDURE — 77080 DXA BONE DENSITY AXIAL: CPT

## 2018-07-02 PROCEDURE — 84443 ASSAY THYROID STIM HORMONE: CPT | Performed by: FAMILY MEDICINE

## 2018-07-02 PROCEDURE — 36415 COLL VENOUS BLD VENIPUNCTURE: CPT | Performed by: FAMILY MEDICINE

## 2018-07-02 RX ORDER — BUDESONIDE AND FORMOTEROL FUMARATE DIHYDRATE 80; 4.5 UG/1; UG/1
2 AEROSOL RESPIRATORY (INHALATION) 2 TIMES DAILY
Qty: 1 INHALER | Refills: 5 | Status: SHIPPED | OUTPATIENT
Start: 2018-07-02 | End: 2018-07-05 | Stop reason: SDUPTHER

## 2018-07-02 NOTE — ACP (ADVANCE CARE PLANNING)
Advance Care Planning (ACP) Provider Conversation Snapshot    Date of ACP Conversation: 07/02/18  Persons included in Conversation:  patient  Length of ACP Conversation in minutes:  16 minutes    Authorized Decision Maker (if patient is incapable of making informed decisions): This person is:   Son - Chon Thompson   best friend/neighbor - Cristofer Leigh          For Patients with Decision Making Capacity:   Values/Goals: Exploration of values, goals, and preferences if recovery is not expected, even with continued medical treatment in the event of:  Imminent death  Severe, permanent brain injury  \"In these circumstances, what matters most to you? \"  Care focused more on comfort or quality of life. Patient reports she is an organ donor. Conversation Outcomes / Follow-Up Plan:   Reviewed existing Advance Directive  no updates.

## 2018-07-02 NOTE — PROGRESS NOTES
This is the Subsequent Medicare Annual Wellness Exam, performed 12 months or more after the Initial AWV or the last Subsequent AWV    I have reviewed the patient's medical history in detail and updated the computerized patient record. History     Past Medical History:   Diagnosis Date    Acute bronchitis 12/28/2015    Advance directive discussed with patient 5/4/2016    Pt would like to appoint her son, Francisca Noguera as her medical decision maker in the event that she can no longer do so. Phone number is [phone]. Her secondary person is her next door karen, [employer]. Phone number is [phone]. If her death is imminent and medical treatment will not help her recover, pt does want life prolonging measures. If her condition makes her unawar    Bilateral leg pain 10/22/2015    Elevated TSH 10/22/2015    Hashimoto's thyroiditis 12/28/2015    Hernia, abdominal     History of benign breast tumor     Menopause     Nicotine dependence in remission 2/4/2016    Obesity, Class I, BMI 30-34.9 2/4/2016    Prediabetes 10/22/2015    Simple chronic bronchitis ([address] 5/4/2016    Skin lesion 2/4/2016    Varicose vein of leg       Past Surgical History:   Procedure Laterality Date    HX BREAST BIOPSY Right     Milk ducts removed    HX CYST INCISION AND DRAINAGE Right     37 y/o    HX HERNIA REPAIR      abdominal x2    HX HERNIA REPAIR  09/15/2016    ROBOTIC REPAIR OF RECURRENT INCARCERATED HERNIA WITH EXTENSIVE LYSIS OF ADHESIONS WITH PLACEMENT OF MESH     Current Outpatient Prescriptions   Medication Sig Dispense Refill    budesonide-formoterol (SYMBICORT) 80-4.5 mcg/actuation HFAA Take 2 Puffs by inhalation two (2) times a day. 1 Inhaler 5    ipratropium (ATROVENT HFA) 17 mcg/actuation inhaler 2 puffs every 8 hours. 12.9 Inhaler 5    levothyroxine (SYNTHROID) 50 mcg tablet Take 1 Tab by mouth Daily (before breakfast).  90 Tab 3    omega-3 fatty acids-vitamin e (FISH OIL) 1,000 mg cap Take 1 capsule by mouth.  calcium-cholecalciferol, D3, (CALTRATE 600+D) tablet Take 1 tablet by mouth daily.  aspirin delayed-release 81 mg tablet Take  by mouth daily. No Known Allergies  Family History   Problem Relation Age of Onset    Breast Cancer Mother 38     36s    Lung Disease Mother     Elevated Lipids Mother     Stroke Father     Diabetes Paternal Grandfather      Social History   Substance Use Topics    Smoking status: Former Smoker     Packs/day: 1.00     Years: 30.00     Quit date: 1/1/1992    Smokeless tobacco: Never Used    Alcohol use 1.2 oz/week     2 Glasses of wine per week      Comment: Occasionally      Patient Active Problem List   Diagnosis Code    Hyperlipidemia with target LDL less than 70 E78.5    Hashimoto's thyroiditis E06.3    Nicotine dependence in remission F17.201    Obesity, Class I, BMI 30-34.9 E66.9    Advance directive discussed with patient Z70.80    COPD with exacerbation ([facility] J44.1    Influenza J11.1    Acute bronchitis J20.9       Depression Risk Factor Screening:     PHQ over the last two weeks 7/2/2018   Little interest or pleasure in doing things Not at all   Feeling down, depressed or hopeless Not at all   Total Score PHQ 2 0     Alcohol Risk Factor Screening: You do not drink alcohol or very rarely. Functional Ability and Level of Safety:   Hearing Loss     Hearing Screening    125Hz 250Hz 500Hz 1000Hz 2000Hz 3000Hz 4000Hz 6000Hz 8000Hz   Right ear: Fail Pass Pass  Fail     Left ear:   Pass Pass Pass  Fail        Visual Acuity Screening    Right eye Left eye Both eyes   Without correction: 20/30 20/15 20/15   With correction:        Activities of Daily Living  The home contains: no safety equipment. Patient does total self care    Fall Risk  Fall Risk Assessment, last 12 mths 7/2/2018   Able to walk? Yes   Fall in past 12 months?  No       Abuse Screen  Patient is not abused    Cognitive Screening   Evaluation of Cognitive Function:  Has your family/caregiver stated any concerns about your memory: no  Normal    Patient Care Team   Patient Care Team:  [address] [place] [address], DO as PCP - General (Family Practice)  Sarah Hays MD (Gastroenterology)  Lillian Skelton MD (Dermatology)  Aleta Dickinson NP (Nurse Practitioner)  Kaci Gonzalez MD as Consulting Provider (General Surgery)    Assessment/Plan   Education and counseling provided: + 15 Minutes   End-of-Life planning (with patient's consent)  Pneumococcal Vaccine-11/15/2016  Influenza Vaccine-09/27/2017  Screening Mammography  Bone mass measurement (DEXA)  Screening for glaucoma    Diagnoses and all orders for this visit:    1. COPD with exacerbation (HCC)  -     AMB POC SPIROMETRY W/O BRONCHODILATOR  -     budesonide-formoterol (SYMBICORT) 80-4.5 mcg/actuation HFAA; Take 2 Puffs by inhalation two (2) times a day. 2. Centrilobular emphysema ([address]  -     budesonide-formoterol (SYMBICORT) 80-4.5 mcg/actuation HFAA; Take 2 Puffs by inhalation two (2) times a day. 3. Simple chronic bronchitis (HCC)  -     ipratropium (ATROVENT HFA) 17 mcg/actuation inhaler; 2 puffs every 8 hours. 4. Screening for glaucoma  -     REFERRAL TO OPHTHALMOLOGY    5. Screening for breast cancer  -     CHEYENNE MAMMO BI SCREENING INCL CAD; Future    6. Post-menopausal  -     DEXA BONE DENSITY STUDY AXIAL; Future    7. Medicare annual wellness visit, subsequent    8. Screening cholesterol level  -     LIPID PANEL; Future  -     METABOLIC PANEL, COMPREHENSIVE; Future    9. Hashimoto's thyroiditis  -     TSH 3RD GENERATION;  Future      Health Maintenance Due   Topic Date Due    GLAUCOMA SCREENING Q2Y  07/07/2010

## 2018-07-02 NOTE — PROGRESS NOTES
Fe Weaver is a 67 y.o. female presented to clinic for a routine physical. Pt denies any pain at this time.     /1. Have you been to the ER, urgent care clinic since your last visit? Hospitalized since your last visit? No    2. Have you seen or consulted any other health care providers outside of the [address] [place] [address] since your last visit? Include any pap smears or colon screening.  No     Learning Assessment 9/17/2014   PRIMARY LEARNER Patient   HIGHEST LEVEL OF EDUCATION - PRIMARY LEARNER  GRADUATED HIGH SCHOOL OR GED   BARRIERS PRIMARY LEARNER NONE   PRIMARY LANGUAGE ENGLISH   LEARNER PREFERENCE PRIMARY LISTENING   ANSWERED BY pt   RELATIONSHIP SELF     Health Maintenance Due   Topic Date Due    GLAUCOMA SCREENING Q2Y  07/07/2010    MEDICARE YEARLY EXAM  06/28/2018

## 2018-07-02 NOTE — MR AVS SNAPSHOT
[address] [place] [id number] 
[phone] Patient: Sunil Barton MRN: [id number] Visit Information Date & Time Provider Department Dept. Phone Encounter #  
 7/2/2018  9:40 AM Yonny Noel, [address] [id number] Follow-up Instructions Return in about 6 months (around 1/2/2019). Upcoming Health Maintenance Date Due  
 GLAUCOMA SCREENING Q2Y 7/7/2010 MEDICARE YEARLY EXAM 6/28/2018 Influenza Age 5 to Adult 8/1/2018 BREAST CANCER SCRN MAMMOGRAM 6/12/2019 DTaP/Tdap/Td series (2 - Td) 6/23/2025 COLONOSCOPY 4/20/2026 Allergies as of 7/2/2018  Review Complete On: 7/2/2018 By: Yonny Noel, DO No Known Allergies Current Immunizations  Reviewed on 6/27/2017 Name Date Influenza High Dose Vaccine PF 9/27/2017 Pneumococcal Conjugate (PCV-13) 11/15/2016 Pneumococcal Polysaccharide (PPSV-23) 10/15/2012 Tdap 6/23/2015 Not reviewed this visit You Were Diagnosed With   
  
 Codes Comments COPD with exacerbation ([facility]    -  Primary ICD-10-CM: J44.1 ICD-9-CM: 491.21 Centrilobular emphysema ([facility]     ICD-10-CM: J43.2 ICD-9-CM: 492.8 Simple chronic bronchitis (HCC)     ICD-10-CM: J41.0 ICD-9-CM: 491.0 Screening for glaucoma     ICD-10-CM: Z13.5 ICD-9-CM: V80.1 Screening for breast cancer     ICD-10-CM: Z12.31 
ICD-9-CM: V76.10 Post-menopausal     ICD-10-CM: Z78.0 ICD-9-CM: V49.81 Medicare annual wellness visit, subsequent     ICD-10-CM: Z00.00 ICD-9-CM: V70.0 Screening cholesterol level     ICD-10-CM: G39.091 ICD-9-CM: V77.91 Hashimoto's thyroiditis     ICD-10-CM: E06.3 ICD-9-CM: 282. 2 Vitals BP Pulse Temp Resp Height(growth percentile) Weight(growth percentile)  131/72 (BP 1 Location: Left arm, BP Patient Position: Sitting) 72 97.2 °F (36.2 °C) (Oral) 18 5' 8\" (1.727 m) 216 lb 6.4 oz (98.2 kg) SpO2 BMI OB Status Smoking Status 95% 32.9 kg/m2 Postmenopausal Former Smoker BMI and BSA Data Body Mass Index Body Surface Area 32.9 kg/m 2 2.17 m 2 Preferred Pharmacy Pharmacy Name Phone [id number] [facility], [place] [phone] Your Updated Medication List  
  
   
This list is accurate as of 18 10:57 AM.  Always use your most recent med list.  
  
  
  
  
 aspirin delayed-release 81 mg tablet Take  by mouth daily. budesonide-formoterol 80-4.5 mcg/actuation Hfaa Commonly known as:  SYMBICORT Take 2 Puffs by inhalation two (2) times a day. calcium-cholecalciferol (D3) tablet Commonly known as:  CALTRATE 600+D Take 1 tablet by mouth daily. FISH OIL 1,000 mg Cap Generic drug:  omega-3 fatty acids-vitamin e Take 1 capsule by mouth. ipratropium 17 mcg/actuation inhaler Commonly known as:  ATROVENT HFA  
2 puffs every 8 hours. levothyroxine 50 mcg tablet Commonly known as:  SYNTHROID Take 1 Tab by mouth Daily (before breakfast). Prescriptions Sent to Pharmacy Refills  
 budesonide-formoterol (SYMBICORT) 80-4.5 mcg/actuation HFAA 5 Sig: Take 2 Puffs by inhalation two (2) times a day. Class: Normal  
 [facility]acy: [facility], [place] Ph #: [phone] Route: Inhalation  
 ipratropium (ATROVENT HFA) 17 mcg/actuation inhaler 5 Si puffs every 8 hours. Class: Normal  
 Pharmacy: [facility] [place] Ph #: [phone] We Performed the Following AMB POC SPIROMETRY W/O BRONCHODILATOR [66822 CPT(R)] REFERRAL TO OPHTHALMOLOGY [REF57 Custom] Comments:  
 Please screen patient for glaucoma. Follow-up Instructions Return in about 6 months (around 2019). To-Do List   
 07/02/2018 Imaging:  DEXA BONE DENSITY STUDY AXIAL   
  
 07/02/2018 Lab:  LIPID PANEL   
  
 07/02/2018 Imaging:  CHEYENNE MAMMO BI SCREENING INCL CAD   
  
 07/02/2018 Lab:  METABOLIC PANEL, COMPREHENSIVE   
  
 07/02/2018 Lab:  TSH 3RD GENERATION Referral Information Referral ID Referred By Referred To  
  
 [id number] Colette Manley MD   
   [address]   
   [facility] Phone: [phone] Fax: [phone] Visits Status Start Date End Date 1 Open 7/2/18 7/2/19 If your referral has a status of pending review or denied, additional information will be sent to support the outcome of this decision. Patient Instructions Medicare Part B Preventive Services Limitations Recommendation Scheduled Bone Mass Measurement 
(age 72 & older, biennial) Requires diagnosis related to osteoporosis or estrogen deficiency. Biennial benefit unless patient has history of long-term glucocorticoid tx or baseline is needed because initial test was by other method Cardiovascular Screening Blood Tests (every 5 years) Total cholesterol, HDL, Triglycerides Order as a panel if possible Colorectal Cancer Screening 
-Fecal occult blood test (annual) -Flexible sigmoidoscopy (5y) 
-Screening colonoscopy (10y) -Barium Enema Counseling to Prevent Tobacco Use (up to 8 sessions per year) - Counseling greater than 3 and up to 10 minutes - Counseling greater than 10 minutes Patients must be asymptomatic of tobacco-related conditions to receive as preventive service Diabetes Screening Tests (at least every 3 years, Medicare covers annually or at 6-month intervals for prediabetic patients) Fasting blood sugar (FBS) or glucose tolerance test (GTT) Patient must be diagnosed with one of the following: 
-Hypertension, Dyslipidemia, obesity, previous impaired FBS or GTT Or any two of the following: overweight, FH of diabetes, age ? 72, history of gestational diabetes, birth of baby weighing more than 9 pounds Diabetes Self-Management Training (DSMT) (no USPSTF recommendation) Requires referral by treating physician for patient with diabetes or renal disease. 10 hours of initial DSMT session of no less than 30 minutes each in a continuous 12-month period. 2 hours of follow-up DSMT in subsequent years. Glaucoma Screening (no USPSTF recommendation) Diabetes mellitus, family history, , age 48 or over,  American, age 72 or over Human Immunodeficiency Virus (HIV) Screening (annually for increased risk patients) HIV-1 and HIV-2 by EIA, AYESHA, rapid antibody test, or oral mucosa transudate Patient must be at increased risk for HIV infection per USPSTF guidelines or pregnant. Tests covered annually for patients at increased risk. Pregnant patients may receive up to 3 test during pregnancy. Medical Nutrition Therapy (MNT) (for diabetes or renal disease not recommended schedule) Requires referral by treating physician for patient with diabetes or renal disease. Can be provided in same year as diabetes self-management training (DSMT), and CMS recommends medical nutrition therapy take place after DSMT. Up to 3 hours for initial year and 2 hours in subsequent years. Shingles Vaccination A shingles vaccine is also recommended once in a lifetime after age 61 Seasonal Influenza Vaccination (annually) Pneumococcal Vaccination (once after 65) Hepatitis B Vaccinations (if medium/high risk) Medium/high risk factors:  End-stage renal disease, Hemophiliacs who received Factor VIII or IX concentrates, Clients of institutions for the mentally retarded, Persons who live in the same house as a HepB virus carrier, Homosexual men, Illicit injectable drug abusers. Screening Mammography (biennial age 54-69) Annually (age 36 or over) Screening Pap Tests and Pelvic Examination (up to age 79 and after 79 if unknown history or abnormal study last 10 years) Every 24 months except high risk Ultrasound Screening for Abdominal Aortic Aneurysm (AAA) (once) Patient must be referred through [facility] and not have had a screening for abdominal aortic aneurysm before under Medicare. Limited to patients who meet one of the following criteria: 
- Men who are 73-68 years old and have smoked more than 100 cigarettes in their lifetime. 
-Anyone with a FH of AAA 
-Anyone recommended for screening by USPSTF Introducing \[facility] [place] [facility]\"" & [facility] SERVICES! Dear Erich Wooten: Thank you for requesting a [employer] account. Our records indicate that you already have an active [employer] account. You can access your account anytime at [email or web address] Did you know that you can access your hospital and ER discharge instructions at any time in [employer]? You can also review all of your test results from your hospital stay or ER visit. Additional Information If you have questions, please visit the Frequently Asked Questions section of the [employer] website at [email or web address] Remember, [employer] is NOT to be used for urgent needs. For medical emergencies, dial 911. Now available from your iPhone and Android! Please provide this summary of care documentation to your next provider. Your primary care clinician is listed as Kalie Mora. If you have any questions after today's visit, please call [phone].

## 2018-07-02 NOTE — PATIENT INSTRUCTIONS
Medicare Part B Preventive Services Limitations Recommendation Scheduled   Bone Mass Measurement  (age 72 & older, biennial) Requires diagnosis related to osteoporosis or estrogen deficiency. Biennial benefit unless patient has history of long-term glucocorticoid tx or baseline is needed because initial test was by other method     Cardiovascular Screening Blood Tests (every 5 years)  Total cholesterol, HDL, Triglycerides Order as a panel if possible     Colorectal Cancer Screening  -Fecal occult blood test (annual)  -Flexible sigmoidoscopy (5y)  -Screening colonoscopy (10y)  -Barium Enema      Counseling to Prevent Tobacco Use (up to 8 sessions per year)  - Counseling greater than 3 and up to 10 minutes  - Counseling greater than 10 minutes Patients must be asymptomatic of tobacco-related conditions to receive as preventive service     Diabetes Screening Tests (at least every 3 years, Medicare covers annually or at 6-month intervals for prediabetic patients)    Fasting blood sugar (FBS) or glucose tolerance test (GTT) Patient must be diagnosed with one of the following:  -Hypertension, Dyslipidemia, obesity, previous impaired FBS or GTT  Or any two of the following: overweight, FH of diabetes, age ? 72, history of gestational diabetes, birth of baby weighing more than 9 pounds     Diabetes Self-Management Training (DSMT) (no USPSTF recommendation) Requires referral by treating physician for patient with diabetes or renal disease. 10 hours of initial DSMT session of no less than 30 minutes each in a continuous 12-month period. 2 hours of follow-up DSMT in subsequent years.      Glaucoma Screening (no USPSTF recommendation) Diabetes mellitus, family history, , age 48 or over,  American, age 72 or over     Human Immunodeficiency Virus (HIV) Screening (annually for increased risk patients)  HIV-1 and HIV-2 by EIA, AYESHA, rapid antibody test, or oral mucosa transudate Patient must be at increased risk for HIV infection per USPSTF guidelines or pregnant. Tests covered annually for patients at increased risk. Pregnant patients may receive up to 3 test during pregnancy. Medical Nutrition Therapy (MNT) (for diabetes or renal disease not recommended schedule) Requires referral by treating physician for patient with diabetes or renal disease. Can be provided in same year as diabetes self-management training (DSMT), and CMS recommends medical nutrition therapy take place after DSMT. Up to 3 hours for initial year and 2 hours in subsequent years. Shingles Vaccination A shingles vaccine is also recommended once in a lifetime after age 61     Seasonal Influenza Vaccination (annually)      Pneumococcal Vaccination (once after 72)      Hepatitis B Vaccinations (if medium/high risk) Medium/high risk factors:  End-stage renal disease,  Hemophiliacs who received Factor VIII or IX concentrates, Clients of institutions for the mentally retarded, Persons who live in the same house as a HepB virus carrier, Homosexual men, Illicit injectable drug abusers. Screening Mammography (biennial age 54-69) Annually (age 36 or over)     Screening Pap Tests and Pelvic Examination (up to age 79 and after 79 if unknown history or abnormal study last 10 years) Every 25 months except high risk     Ultrasound Screening for Abdominal Aortic Aneurysm (AAA) (once) Patient must be referred through [facility] and not have had a screening for abdominal aortic aneurysm before under Medicare.   Limited to patients who meet one of the following criteria:  - Men who are 73-68 years old and have smoked more than 100 cigarettes in their lifetime.  -Anyone with a FH of AAA  -Anyone recommended for screening by USPSTF

## 2018-07-02 NOTE — PROGRESS NOTES
Subjective:     HPI:  Inocencio Sanz is a 67 y.o. female who presents to the office for routine care. COPD: Pt reports she is running low on Symbicort. She states for the last week she was taking one puff daily instead of four puffs every eight hours due to her low supply. Pt reports taking Atrovent TID after being diagnosed with the flu and bronchitis. Pt states while taking the medications her \"symptoms were non-existent. \" Pt states she desires to not have to take medication so frequently because she forgets. Mammogram: Last mammogram 06/12/2017. Pt denies any lumps or bumps on her breasts. Current Outpatient Prescriptions   Medication Sig Dispense Refill    budesonide-formoterol (SYMBICORT) 80-4.5 mcg/actuation HFAA Take 2 Puffs by inhalation two (2) times a day. 1 Inhaler 5    ipratropium (ATROVENT HFA) 17 mcg/actuation inhaler 2 puffs every 8 hours. 12.9 Inhaler 5    levothyroxine (SYNTHROID) 50 mcg tablet Take 1 Tab by mouth Daily (before breakfast). 90 Tab 3    omega-3 fatty acids-vitamin e (FISH OIL) 1,000 mg cap Take 1 capsule by mouth.  calcium-cholecalciferol, D3, (CALTRATE 600+D) tablet Take 1 tablet by mouth daily.  aspirin delayed-release 81 mg tablet Take  by mouth daily. No Known Allergies    Past Medical History:   Diagnosis Date    Acute bronchitis 12/28/2015    Advance directive discussed with patient 5/4/2016    Pt would like to appoint her son, Kevin Chand as her medical decision maker in the event that she can no longer do so. Phone number is [phone]. Her secondary [facility] Phone number is [phone]. If her death is imminent and medical treatment will not help her recover, pt does want life prolonging measures.   If her condition makes her unawar    Bilateral leg pain 10/22/2015    Elevated TSH 10/22/2015    Hashimoto's thyroiditis 12/28/2015    Hernia, abdominal     History of benign breast tumor     Menopause     Nicotine dependence in remission 2/4/2016    Obesity, Class I, BMI 30-34.9 2/4/2016    Prediabetes 10/22/2015    Simple chronic bronchitis (HCC) 5/4/2016    Skin lesion 2/4/2016    Varicose vein of leg         Past Surgical History:   Procedure Laterality Date    HX BREAST BIOPSY Right     Milk ducts removed    HX CYST INCISION AND DRAINAGE Right     37 y/o    HX HERNIA REPAIR      abdominal x2    HX HERNIA REPAIR  09/15/2016    ROBOTIC REPAIR OF RECURRENT INCARCERATED HERNIA WITH EXTENSIVE LYSIS OF ADHESIONS WITH PLACEMENT OF MESH       Family History   Problem Relation Age of Onset    Breast Cancer Mother 38     36s    Lung Disease Mother     Elevated Lipids Mother     Stroke Father     Diabetes Paternal Grandfather        Social History     Social History    Marital status: SINGLE     Spouse name: N/A    Number of children: N/A    Years of education: N/A     Occupational History    Not on file. Social History Main Topics    Smoking status: Former Smoker     Packs/day: 1.00     Years: 30.00     Quit date: 1/1/1992    Smokeless tobacco: Never Used    Alcohol use 1.2 oz/week     2 Glasses of wine per week      Comment: Occasionally     Drug use: No    Sexual activity: Not Currently     Other Topics Concern    Not on file     Social History Narrative       REVIEW OF SYSTEM:  Review of Systems   Constitutional: Negative for chills and fever. Eyes: Negative for blurred vision. Respiratory: Negative for shortness of breath. Cardiovascular: Negative for chest pain, palpitations and leg swelling. Gastrointestinal: Negative for constipation, diarrhea, nausea and vomiting. Musculoskeletal: Negative for joint pain. Neurological: Negative for headaches.        Objective:     Visit Vitals    /72 (BP 1 Location: Left arm, BP Patient Position: Sitting)    Pulse 72    Temp 97.2 °F (36.2 °C) (Oral)    Resp 18    Ht 5' 8\" (1.727 m)    Wt 216 lb 6.4 oz (98.2 kg)    SpO2 95%    BMI 32.9 kg/m2       PHYSICAL EXAM:  Physical Exam   Constitutional: She is oriented to person, place, and time and well-developed, well-nourished, and in no distress. HENT:   Right Ear: Tympanic membrane, external ear and ear canal normal.   Left Ear: Tympanic membrane, external ear and ear canal normal.   Nose: Nose normal.   Mouth/Throat: Oropharynx is clear and moist.   Eyes: Pupils are equal, round, and reactive to light. Neck: Normal range of motion. Neck supple. No thyromegaly present. Cardiovascular: Normal rate, regular rhythm, normal heart sounds and intact distal pulses. No murmur heard. Pulmonary/Chest: Effort normal and breath sounds normal. She has no wheezes. Neurological: She is alert and oriented to person, place, and time. GCS score is 15. Skin: Skin is warm and dry. Vitals reviewed. Assessment/Plan:       ICD-10-CM ICD-9-CM    1. COPD with exacerbation ([facility]) J44.1 491.21 AMB POC SPIROMETRY W/O BRONCHODILATOR      budesonide-formoterol (SYMBICORT) 80-4.5 mcg/actuation HFAA   2. Centrilobular emphysema ([facility]) J43.2 492.8 budesonide-formoterol (SYMBICORT) 80-4.5 mcg/actuation HFAA   3. Simple chronic bronchitis ([facility]) J41.0 491.0 ipratropium (ATROVENT HFA) 17 mcg/actuation inhaler   4. Screening for glaucoma Z13.5 V80.1 REFERRAL TO OPHTHALMOLOGY   5. Screening for breast cancer Z12.31 V76.10 CANCELED: [place] MAMMO BI SCREENING INCL CAD   6. Post-menopausal Z78.0 V49.81 DEXA BONE DENSITY STUDY AXIAL   7. Medicare annual wellness visit, subsequent Z00.00 V70.0    8. Screening cholesterol level Z13.220 V77.91 LIPID PANEL      METABOLIC PANEL, COMPREHENSIVE   9. Hashimoto's thyroiditis E06.3 245.2 TSH 3RD GENERATION     Patient given opportunity to ask questions. Questions answered. Patient understands plan of care. Follow-up Disposition:  Return in about 6 months (around 1/2/2019).     Written by Mercedes Stanton, as dictated by Alfred Gamez Lauren Koehler, Dr. Santhosh Quan, confirm that all documentation is accurate.

## 2018-07-05 DIAGNOSIS — J41.0 SIMPLE CHRONIC BRONCHITIS (HCC): ICD-10-CM

## 2018-07-05 DIAGNOSIS — E06.3 HASHIMOTO'S THYROIDITIS: ICD-10-CM

## 2018-07-05 DIAGNOSIS — J43.2 CENTRILOBULAR EMPHYSEMA (HCC): ICD-10-CM

## 2018-07-05 DIAGNOSIS — J44.1 COPD WITH EXACERBATION (HCC): ICD-10-CM

## 2018-07-06 NOTE — TELEPHONE ENCOUNTER
Last seen 07/02/2018  Next visit  01/02/2019    Medications last ordered 07/02/2018 both with 5 refills. She is requesting a 90 day supply.  Please assist.

## 2018-07-09 RX ORDER — BUDESONIDE AND FORMOTEROL FUMARATE DIHYDRATE 80; 4.5 UG/1; UG/1
2 AEROSOL RESPIRATORY (INHALATION) 2 TIMES DAILY
Qty: 3 INHALER | Refills: 4 | Status: SHIPPED | OUTPATIENT
Start: 2018-07-09 | End: 2019-09-19 | Stop reason: SDUPTHER

## 2019-01-02 ENCOUNTER — HOSPITAL ENCOUNTER (OUTPATIENT)
Dept: LAB | Age: 74
Discharge: HOME | End: 2019-01-02
Payer: MEDICARE

## 2019-01-02 ENCOUNTER — OFFICE VISIT (OUTPATIENT)
Dept: FAMILY MEDICINE CLINIC | Age: 74
End: 2019-01-02

## 2019-01-02 VITALS
BODY MASS INDEX: 31.37 KG/M2 | RESPIRATION RATE: 16 BRPM | TEMPERATURE: 96 F | DIASTOLIC BLOOD PRESSURE: 64 MMHG | HEIGHT: 68 IN | WEIGHT: 207 LBS | OXYGEN SATURATION: 94 % | SYSTOLIC BLOOD PRESSURE: 139 MMHG | HEART RATE: 75 BPM

## 2019-01-02 DIAGNOSIS — E06.3 HASHIMOTO'S THYROIDITIS: Primary | ICD-10-CM

## 2019-01-02 DIAGNOSIS — E06.3 HASHIMOTO'S THYROIDITIS: ICD-10-CM

## 2019-01-02 DIAGNOSIS — E83.51 HYPOCALCEMIA: ICD-10-CM

## 2019-01-02 LAB
ALBUMIN SERPL-MCNC: 3.6 G/DL (ref 3.4–5)
ALBUMIN/GLOB SERPL: 0.9 {RATIO} (ref 0.8–1.7)
ALP SERPL-CCNC: 92 U/L (ref 45–117)
ALT SERPL-CCNC: 22 U/L (ref 13–56)
ANION GAP SERPL CALC-SCNC: 5 MMOL/L (ref 3–18)
AST SERPL-CCNC: 14 U/L (ref 15–37)
BILIRUB SERPL-MCNC: 0.3 MG/DL (ref 0.2–1)
BUN SERPL-MCNC: 21 MG/DL (ref 7–18)
BUN/CREAT SERPL: 18 (ref 12–20)
CALCIUM SERPL-MCNC: 8.9 MG/DL (ref 8.5–10.1)
CHLORIDE SERPL-SCNC: 103 MMOL/L (ref 100–108)
CO2 SERPL-SCNC: 30 MMOL/L (ref 21–32)
CREAT SERPL-MCNC: 1.2 MG/DL (ref 0.6–1.3)
GLOBULIN SER CALC-MCNC: 4.1 G/DL (ref 2–4)
GLUCOSE SERPL-MCNC: 95 MG/DL (ref 74–99)
POTASSIUM SERPL-SCNC: 4.5 MMOL/L (ref 3.5–5.5)
PROT SERPL-MCNC: 7.7 G/DL (ref 6.4–8.2)
SODIUM SERPL-SCNC: 138 MMOL/L (ref 136–145)
TSH SERPL DL<=0.05 MIU/L-ACNC: 3.7 UIU/ML (ref 0.36–3.74)

## 2019-01-02 PROCEDURE — 36415 COLL VENOUS BLD VENIPUNCTURE: CPT

## 2019-01-02 PROCEDURE — 80053 COMPREHEN METABOLIC PANEL: CPT

## 2019-01-02 PROCEDURE — 84443 ASSAY THYROID STIM HORMONE: CPT

## 2019-01-02 RX ORDER — LEVOTHYROXINE SODIUM 50 UG/1
50 TABLET ORAL
Qty: 90 TAB | Refills: 3 | Status: SHIPPED | OUTPATIENT
Start: 2019-01-02 | End: 2020-01-27 | Stop reason: SDUPTHER

## 2019-01-02 NOTE — PROGRESS NOTES
Subjective: HPI: 
Charlotte Armendariz is a 68 y.o. female who presents to the office today for medication refill and routine care. Hashimoto's thyroiditis: Pt reports she is taking her Synthroid medication as prescribed. She reports she has switched to a non-dairy creamer. Pt also notes that she has lost weight since her last visit. Denies any symptoms related to underactive thyroid. Denies fatigue, constipation, denies confusion, denies hair loss. She states that she is now moving around more as she is now a caretaker of two people. Mostly doing  care and helping with unpacking with another. Lab Results Component Value Date/Time TSH 3.86 (H) 07/02/2018 11:07 AM  
 
Wt Readings from Last 3 Encounters:  
01/02/19 207 lb (93.9 kg) 07/02/18 216 lb 6.4 oz (98.2 kg) 03/29/18 210 lb (95.3 kg) Current Outpatient Medications Medication Sig Dispense Refill  levothyroxine (SYNTHROID) 50 mcg tablet Take 1 Tab by mouth Daily (before breakfast). 90 Tab 3  
 ipratropium (ATROVENT HFA) 17 mcg/actuation inhaler 2 puffs every 8 hours. 3 Inhaler 4  
 budesonide-formoterol (SYMBICORT) 80-4.5 mcg/actuation HFAA Take 2 Puffs by inhalation two (2) times a day. 3 Inhaler 4  
 omega-3 fatty acids-vitamin e (FISH OIL) 1,000 mg cap Take 1 capsule by mouth.  calcium-cholecalciferol, D3, (CALTRATE 600+D) tablet Take 1 tablet by mouth daily.  aspirin delayed-release 81 mg tablet Take  by mouth daily. No Known Allergies Past Medical History:  
Diagnosis Date  Acute bronchitis 12/28/2015  Advance directive discussed with patient 5/4/2016 Pt would like to appoint her son, Tiffany Canales as her medical decision maker in the event that she can no longer do so. Phone number is [phone]. Her secondary person is her next door [employer]. Phone number is [phone].   If her death is imminent and medical treatment will not help her recover, pt does want life prolonging measures. If her condition makes her unawar  Bilateral leg pain 10/22/2015  Elevated TSH 10/22/2015  Hashimoto's thyroiditis 2015  Hernia, abdominal   
 History of benign breast tumor  Menopause  Nicotine dependence in remission 2016  Obesity, Class I, BMI 30-34.9 2016  Prediabetes 10/22/2015  Simple chronic bronchitis ([address] 2016  
 Skin lesion 2016  Varicose vein of leg Past Surgical History:  
Procedure Laterality Date  HX BREAST BIOPSY Right Milk ducts removed  HX CYST INCISION AND DRAINAGE Right 37 y/o  
 HX HERNIA REPAIR    
 abdominal x2  HX HERNIA REPAIR  09/15/2016 ROBOTIC REPAIR OF RECURRENT INCARCERATED HERNIA WITH EXTENSIVE LYSIS OF ADHESIONS WITH PLACEMENT OF MESH Family History Problem Relation Age of Onset  Breast Cancer Mother 37  
     39s  Lung Disease Mother  Elevated Lipids Mother  Stroke Father  Diabetes Paternal Grandfather Social History Socioeconomic History  Marital status: SINGLE Spouse name: Not on file  Number of children: Not on file  Years of education: Not on file  Highest education level: Not on file Social Needs  Financial resource strain: Not on file  Food insecurity - worry: Not on file  Food insecurity - inability: Not on file  Transportation needs - medical: Not on file  Transportation needs - non-medical: Not on file Occupational History  Not on file Tobacco Use  Smoking status: Former Smoker Packs/day: 1.00 Years: 30.00 Pack years: 30.00 Last attempt to quit: 1992 Years since quittin.0  Smokeless tobacco: Never Used Substance and Sexual Activity  Alcohol use: Yes Alcohol/week: 1.2 oz Types: 2 Glasses of wine per week Comment: Occasionally  Drug use: No  
 Sexual activity: Not Currently Other Topics Concern  Not on file Social History Narrative  Not on file REVIEW OF SYSTEM: 
Review of Systems Constitutional: Negative for chills and fever. Eyes: Negative for blurred vision. Respiratory: Negative for shortness of breath. Cardiovascular: Negative for chest pain, palpitations and leg swelling. Gastrointestinal: Negative for constipation, diarrhea, nausea and vomiting. Musculoskeletal: Negative for joint pain. Neurological: Negative for headaches. Objective:  
 
Visit Vitals /64 (BP 1 Location: Right arm, BP Patient Position: Sitting) Pulse 75 Temp 96 °F (35.6 °C) (Oral) Resp 16 Ht 5' 8\" (1.727 m) Wt 207 lb (93.9 kg) SpO2 94% BMI 31.47 kg/m² PHYSICAL EXAM: 
Physical Exam  
Constitutional: She is oriented to person, place, and time and well-developed, well-nourished, and in no distress. HENT:  
Right Ear: Tympanic membrane, external ear and ear canal normal.  
Left Ear: Tympanic membrane, external ear and ear canal normal.  
Nose: Nose normal.  
Mouth/Throat: Oropharynx is clear and moist.  
Eyes: Pupils are equal, round, and reactive to light. Neck: Normal range of motion. Neck supple. No thyromegaly present. Cardiovascular: Normal rate, regular rhythm, normal heart sounds and intact distal pulses. No murmur heard. Pulmonary/Chest: Effort normal and breath sounds normal. She has no wheezes. Neurological: She is alert and oriented to person, place, and time. GCS score is 15. Skin: Skin is warm and dry. Vitals reviewed. Assessment/Plan: ICD-10-CM ICD-9-CM 1. Hashimoto's thyroiditis E06.3 245.2 TSH 3RD GENERATION  
   levothyroxine (SYNTHROID) 50 mcg tablet 2. Hypocalcemia V08.12 417.84 METABOLIC PANEL, COMPREHENSIVE Patient given opportunity to ask questions. Questions answered. Patient understands plan of care. Follow-up Disposition: Return in about 6 months (around 7/2/2019) for medicare wellness physical. . 
 
Written by Chris Maya, as dictated by Vanessa Pruitt DO. 
 
I, Dr. Vanessa Pruitt, confirm that all documentation is accurate.

## 2019-01-02 NOTE — PROGRESS NOTES
1. Have you been to the ER, urgent care clinic since your last visit? Hospitalized since your last visit? No 
 
2. Have you seen or consulted any other health care providers outside of the [address] [place] [address] since your last visit? Include any pap smears or colon screening. No  
 
Patient presents in office today for routine care. Patient concerns: med refill

## 2019-02-18 ENCOUNTER — OFFICE VISIT (OUTPATIENT)
Dept: INTERNAL MEDICINE CLINIC | Age: 74
End: 2019-02-18

## 2019-02-18 VITALS
SYSTOLIC BLOOD PRESSURE: 127 MMHG | BODY MASS INDEX: 31.52 KG/M2 | DIASTOLIC BLOOD PRESSURE: 61 MMHG | WEIGHT: 208 LBS | OXYGEN SATURATION: 94 % | HEIGHT: 68 IN | HEART RATE: 86 BPM | TEMPERATURE: 98.1 F | RESPIRATION RATE: 18 BRPM

## 2019-02-18 DIAGNOSIS — J98.01 BRONCHOSPASM: ICD-10-CM

## 2019-02-18 DIAGNOSIS — R09.02 HYPOXEMIA: ICD-10-CM

## 2019-02-18 DIAGNOSIS — R68.89 FLU-LIKE SYMPTOMS: ICD-10-CM

## 2019-02-18 DIAGNOSIS — R05.8 PRODUCTIVE COUGH: Primary | ICD-10-CM

## 2019-02-18 DIAGNOSIS — R06.02 SHORTNESS OF BREATH: ICD-10-CM

## 2019-02-18 LAB
QUICKVUE INFLUENZA TEST: POSITIVE
VALID INTERNAL CONTROL?: YES

## 2019-02-18 RX ORDER — IPRATROPIUM BROMIDE AND ALBUTEROL SULFATE 2.5; .5 MG/3ML; MG/3ML
3 SOLUTION RESPIRATORY (INHALATION)
Qty: 1 NEBULE | Refills: 0 | Status: SHIPPED | COMMUNITY
Start: 2019-02-18 | End: 2019-02-18

## 2019-02-18 RX ORDER — OSELTAMIVIR PHOSPHATE 75 MG/1
75 CAPSULE ORAL 2 TIMES DAILY
Qty: 10 CAP | Refills: 0 | Status: SHIPPED | OUTPATIENT
Start: 2019-02-18 | End: 2019-02-23

## 2019-02-18 NOTE — PROGRESS NOTES
Patient of Dr Jeb Barton presents with productive cough of yellowish phlegm,itching throat,sinus congestion x 4 days,she feels better,she has been taking Mucinex D with some relief, she admits to slight SOB and she is hypoxic at this time,though her breathing is non labored and spontaneous. She denies any fever,chills,CP,NVD,hemoptysis,palpitations. She is currently not smoking but she is a former smoker. She denies overusing her maintenance/rescue inhalers. She denies any CP,dizziness,palpitations.

## 2019-02-18 NOTE — PROGRESS NOTES
HISTORY OF PRESENT ILLNESS  Charlotte Armendariz is a 68 y.o. female. Patient of Dr Ha Barrios presents with productive cough of yellowish phlegm,itching throat,sinus congestion x 4 days,she feels better,she has been taking Mucinex D with some relief, she admits to slight SOB and she is hypoxic at this time,though her breathing is non labored and spontaneous. She denies any fever,chills,CP,NVD,hemoptysis,palpitations. She is currently not smoking but she is a former smoker. She denies overusing her maintenance/rescue inhalers. She denies any CP,dizziness,palpitations. Review of Systems   Constitutional: Negative. HENT: Negative. Eyes: Negative. Respiratory: Positive for cough, sputum production, shortness of breath and wheezing. Cardiovascular: Negative. Gastrointestinal: Negative. Genitourinary: Negative. Musculoskeletal: Negative. Skin: Negative. Neurological: Negative. Psychiatric/Behavioral: Negative. Physical Exam   Constitutional: She is oriented to person, place, and time. She appears well-developed. HENT:   Head: Normocephalic. Eyes: Pupils are equal, round, and reactive to light. Neck: Normal range of motion. Cardiovascular: Normal rate. Pulmonary/Chest: Effort normal and breath sounds normal.   Abdominal: Soft. Bowel sounds are normal.   Neurological: She is alert and oriented to person, place, and time. GCS eye subscore is 4. GCS verbal subscore is 5. GCS motor subscore is 6. Skin: Skin is warm. Psychiatric: She has a normal mood and affect. Her behavior is normal. Thought content normal.       ASSESSMENT and PLAN    ICD-10-CM ICD-9-CM    1. Productive cough R05 786.2 XR CHEST PA LAT   2. Shortness of breath R06.02 786.05 XR CHEST PA LAT   3. Bronchospasm J98.01 519.11 albuterol-ipratropium (DUO-NEB) 2.5 mg-0.5 mg/3 ml nebu   4. Hypoxemia R09.02 799.02 XR CHEST PA LAT   5.  Cold J00 460 AMB POC RAPID INFLUENZA TEST      oseltamivir (TAMIFLU) 75 mg capsule   6. Flu-like symptoms R68.89 780.99 oseltamivir (TAMIFLU) 75 mg capsule     Encounter Diagnoses   Name Primary?  Productive cough Yes    Shortness of breath     Bronchospasm     Hypoxemia     Cold     Flu-like symptoms      Orders Placed This Encounter    XR CHEST PA LAT    AMB POC RAPID INFLUENZA TEST    albuterol-ipratropium (DUO-NEB) 2.5 mg-0.5 mg/3 ml nebu    oseltamivir (TAMIFLU) 75 mg capsule     Orders Placed This Encounter    XR CHEST PA LAT     Standing Status:   Future     Standing Expiration Date:   3/18/2020     Order Specific Question:   Reason for Exam     Answer:   SOB,productive cough    AMB POC RAPID INFLUENZA TEST    albuterol-ipratropium (DUO-NEB) 2.5 mg-0.5 mg/3 ml nebu     Sig: 3 mL by Nebulization route now for 1 dose. Dispense:  1 Nebule     Refill:  0    oseltamivir (TAMIFLU) 75 mg capsule     Sig: Take 1 Cap by mouth two (2) times a day for 5 days. Dispense:  10 Cap     Refill:  0     Orders Placed This Encounter    XR CHEST PA LAT    AMB POC RAPID INFLUENZA TEST    albuterol-ipratropium (DUO-NEB) 2.5 mg-0.5 mg/3 ml nebu    oseltamivir (TAMIFLU) 75 mg capsule     Diagnoses and all orders for this visit:    1. Productive cough  -     XR CHEST PA LAT; Future    2. Shortness of breath  -     XR CHEST PA LAT; Future    3. Bronchospasm  -     albuterol-ipratropium (DUO-NEB) 2.5 mg-0.5 mg/3 ml nebu; 3 mL by Nebulization route now for 1 dose. 4. Hypoxemia  -     XR CHEST PA LAT; Future    5. Cold  -     AMB POC RAPID INFLUENZA TEST  -     oseltamivir (TAMIFLU) 75 mg capsule; Take 1 Cap by mouth two (2) times a day for 5 days. 6. Flu-like symptoms  -     oseltamivir (TAMIFLU) 75 mg capsule; Take 1 Cap by mouth two (2) times a day for 5 days. Follow-up Disposition:  Return in about 1 week (around 2/25/2019).   the following changes in treatment are made: Breathing and oxygen saturation improved after two duo nebs with hand held nebulizer, positive for flu as well,will treat for the flu,she has been using netipot and OTC anti histamine. Flu VS URI, hx of former smoker and PN,will still R/o PN with PA chest and f/u x 1 week.

## 2019-02-18 NOTE — PROGRESS NOTES
[facility] presents [facility] for   Chief Complaint   Patient presents with    Cough     x 4 days with  sore throat  better. productive cougfh with  urinary  incontinence       Serge Batista preferred language for health care discussion is english/other. Is someone accompanying this pt? no    Is the patient using any DME equipment during OV? no    Depression Screening:  3 most recent PHQ Screens 1/2/2019 7/2/2018 3/7/2018 1/30/2018 2/15/2017 5/4/2016 7/21/2015   Little interest or pleasure in doing things Not at all Not at all Not at all Not at all Not at all Not at all Not at all   Feeling down, depressed, irritable, or hopeless Not at all Not at all Not at all Not at all Not at all Not at all Not at all   Total Score PHQ 2 0 0 0 0 0 0 0       Learning Assessment:  Learning Assessment 9/17/2014   PRIMARY LEARNER Patient   HIGHEST LEVEL OF EDUCATION - PRIMARY LEARNER  GRADUATED HIGH SCHOOL OR GED   BARRIERS PRIMARY LEARNER NONE   PRIMARY LANGUAGE ENGLISH   LEARNER PREFERENCE PRIMARY LISTENING   ANSWERED BY pt   RELATIONSHIP SELF       Abuse Screening:  Abuse Screening Questionnaire 1/2/2019 2/15/2017 5/4/2016 7/21/2015   Do you ever feel afraid of your partner? N N N N   Are you in a relationship with someone who physically or mentally threatens you? N N N N   Is it safe for you to go home? Kaylin Tao       Fall Risk  Fall Risk Assessment, last 12 mths 7/2/2018 3/7/2018 1/30/2018 5/4/2016 7/21/2015 9/17/2014   Able to walk? Yes Yes Yes Yes Yes Yes   Fall in past 12 months? No No No No No No       Health Maintenance reviewed and discussed per provider. Yes    Sergekevin Batista is due for   Health Maintenance Due   Topic Date Due    Shingrix Vaccine Age 50> (1 of 2) 07/07/1995         Please order/place referral if appropriate. Advance Directive:  1. Do you have an advance directive in place? Patient Reply: no    2.  If not, would you like material regarding how to put one in place? Patient Reply: no    Coordination of Care:  1. Have you been to the ER, urgent care clinic since your last visit? Hospitalized since your last visit? no    2. Have you seen or consulted any other health care providers outside of the [address] [place] [address] since your last visit? Include any pap smears or colon screening.  no

## 2019-02-18 NOTE — PATIENT INSTRUCTIONS
Shortness of Breath: Care Instructions  Your Care Instructions  Shortness of breath has many causes. Sometimes conditions such as anxiety can lead to shortness of breath. Some people get mild shortness of breath when they exercise. Trouble breathing also can be a symptom of a serious problem, such as asthma, lung disease, emphysema, heart problems, and pneumonia. If your shortness of breath continues, you may need tests and treatment. Watch for any changes in your breathing and other symptoms. Follow-up care is a key part of your treatment and safety. Be sure to make and go to all appointments, and call your doctor if you are having problems. It's also a good idea to know your test results and keep a list of the medicines you take. How can you care for yourself at home? · Do not smoke or allow others to smoke around you. If you need help quitting, talk to your doctor about stop-smoking programs and medicines. These can increase your chances of quitting for good. · Get plenty of rest and sleep. · Take your medicines exactly as prescribed. Call your doctor if you think you are having a problem with your medicine. · Find healthy ways to deal with stress. ? Exercise daily. ? Get plenty of sleep. ? Eat regularly and well. When should you call for help? Call 911 anytime you think you may need emergency care. For example, call if:    · You have severe shortness of breath.     · You have symptoms of a heart attack. These may include:  ? Chest pain or pressure, or a strange feeling in the chest.  ? Sweating. ? Shortness of breath. ? Nausea or vomiting. ? Pain, pressure, or a strange feeling in the back, neck, jaw, or upper belly or in one or both shoulders or arms. ? Lightheadedness or sudden weakness. ? A fast or irregular heartbeat. After you call 911, the  may tell you to chew 1 adult-strength or 2 to 4 low-dose aspirin. Wait for an ambulance.  Do not try to drive yourself.    Call your doctor now or seek immediate medical care if:    · Your shortness of breath gets worse or you start to wheeze. Wheezing is a high-pitched sound when you breathe.     · You wake up at night out of breath or have to prop your head up on several pillows to breathe.     · You are short of breath after only light activity or while at rest.    Watch closely for changes in your health, and be sure to contact your doctor if:    · You do not get better over the next 1 to 2 days. Where can you learn more? Go to http://sujata-cara.info/. Enter S780 in the search box to learn more about \"Shortness of Breath: Care Instructions. \"  Current as of: September 5, 2018  Content Version: 11.9  © 5429-4943 [employer] Care instructions adapted under license by [employer] (which disclaims liability or warranty for this information). If you have questions about a medical condition or this instruction, always ask your healthcare professional. [place] [address] any warranty or liability for your use of this information. Upper Respiratory Infection (Cold): Care Instructions  Your Care Instructions    An upper respiratory infection, or URI, is an infection of the nose, sinuses, or throat. URIs are spread by coughs, sneezes, and direct contact. The common cold is the most frequent kind of URI. The flu and sinus infections are other kinds of URIs. Almost all URIs are caused by viruses. Antibiotics won't cure them. But you can treat most infections with home care. This may include drinking lots of fluids and taking over-the-counter pain medicine. You will probably feel better in 4 to 10 days. The doctor has checked you carefully, but problems can develop later. If you notice any problems or new symptoms, get medical treatment right away. Follow-up care is a key part of your treatment and safety.  Be sure to make and go to all appointments, and call your doctor if you are having problems. It's also a good idea to know your test results and keep a list of the medicines you take. How can you care for yourself at home? · To prevent dehydration, drink plenty of fluids, enough so that your urine is light yellow or clear like water. Choose water and other caffeine-free clear liquids until you feel better. If you have kidney, heart, or liver disease and have to limit fluids, talk with your doctor before you increase the amount of fluids you drink. · Take an over-the-counter pain medicine, such as acetaminophen (Tylenol), ibuprofen (Advil, Motrin), or naproxen (Aleve). Read and follow all instructions on the label. · Before you use cough and cold medicines, check the label. These medicines may not be safe for young children or for people with certain health problems. · Be careful when taking over-the-counter cold or flu medicines and Tylenol at the same time. Many of these medicines have acetaminophen, which is Tylenol. Read the labels to make sure that you are not taking more than the recommended dose. Too much acetaminophen (Tylenol) can be harmful. · Get plenty of rest.  · Do not smoke or allow others to smoke around you. If you need help quitting, talk to your doctor about stop-smoking programs and medicines. These can increase your chances of quitting for good. When should you call for help? Call 911 anytime you think you may need emergency care.  For example, call if:    · You have severe trouble breathing.    Call your doctor now or seek immediate medical care if:    · You seem to be getting much sicker.     · You have new or worse trouble breathing.     · You have a new or higher fever.     · You have a new rash.    Watch closely for changes in your health, and be sure to contact your doctor if:    · You have a new symptom, such as a sore throat, an earache, or sinus pain.     · You cough more deeply or more often, especially if you notice more mucus or a change in the color of your mucus.     · You do not get better as expected. Where can you learn more? Go to http://sujata-cara.info/. Enter G481 in the search box to learn more about \"Upper Respiratory Infection (Cold): Care Instructions. \"  Current as of: September 5, 2018  Content Version: 11.9  © 2006-2018 [employer]. Care instructions adapted under license by [employer] (which disclaims liability or warranty for this information). If you have questions about a medical condition or this instruction, always ask your healthcare professional. [place] [address] any warranty or liability for your use of this information. H1N1 Influenza (Swine Flu): After Your Visit  Your Care Instructions  H1N1 flu, also called swine flu, is a type of influenza that is similar to the common flu. Like the common flu, the H1N1 flu comes on suddenly. The symptoms, such as a cough, sore throat, fever, chills, headaches, fatigue, and body aches and pains, are more severe than the common cold. These symptoms may last for 5 to 7 days. Although the H1N1 flu can make you feel very sick, it usually does not cause serious health problems. Home treatment is usually all you need for H1N1 flu symptoms. But your doctor may prescribe antiviral medicine which may prevent other health problems, such as pneumonia, from developing. Children and young adults, pregnant women, and those who have a long-term health problem, such as lung disease, are most at risk for having pneumonia or other health problems. Follow-up care is a key part of your treatment and safety. Be sure to make and go to all appointments, and call your doctor if you are having problems. It's also a good idea to know your test results and keep a list of the medicines you take. How can you care for yourself at home? · Get plenty of rest.  · Drink plenty of fluids, enough so that your urine is light yellow or clear like water.  If you have kidney, heart, or liver disease and have to limit fluids, talk with your doctor before you increase the amount of fluids you drink. · Take an over-the-counter pain medicine if needed, such as acetaminophen (Tylenol), ibuprofen (Advil, Motrin), or naproxen (Aleve), to relieve fever, headache, and muscle aches. Be safe with medicines. Read and follow all instructions on the label. No one younger than 20 should take aspirin. It has been linked to Reye syndrome, a serious illness. · Do not take two or more pain medicines at the same time unless the doctor told you to. Many pain medicines have acetaminophen, which is Tylenol. Too much acetaminophen (Tylenol) can be harmful. · Do not smoke. Smoking can make the H1N1 flu worse. If you need help quitting, talk to your doctor about stop-smoking programs and medicines. These can increase your chances of quitting for good. · Breathe moist air from a hot shower or from a sink filled with hot water to help clear a stuffy nose. · Before you use cough and cold medicines, check the label. These medicines may not be safe for young children or for people with certain health problems. · If the skin around your nose and lips becomes sore, put some petroleum jelly on the area. · To ease coughing:  ¨ Drink fluids to soothe a scratchy throat. ¨ Suck on cough drops or plain, hard candy. ¨ Try an over-the-counter cough medicine. Read and follow all instructions on the label. ¨ Raise your head at night with an extra pillow. This may help you rest if coughing keeps you awake. · Take any prescribed medicine exactly as directed. Call your doctor if you think you are having a problem with your medicine. To avoid spreading the H1N1 flu  · Wash your hands often, using soap and water. Alcohol-based hand  also work well. And keep your hands away from your face.   · Stay home from school, work, and other public places until you are feeling better and your fever has been gone for at least 24 hours. The fever needs to have gone away on its own without the help of medicine. · Try to avoid being around other people. If you have to be around people (including those you live with), wear a mask over your nose and mouth if you can. · Ask people living with you, especially those at high risk for complications from the flu, to talk to their doctors about preventing the flu. They may get antiviral medicine to keep from getting the flu from you. · To prevent the flu in the future, get the flu vaccine every year, as soon as it's available. Encourage people living with you to get the vaccine. · Cover your mouth when you cough or sneeze. When should you call for help? Call 911 anytime you think you may need emergency care. For example, call if:  · You have severe trouble breathing. Call your doctor now or seek immediate medical care if:  · You have increased trouble breathing. · You have a fever with a stiff neck or a severe headache. · You feel very sleepy or confused. Watch closely for changes in your health, and be sure to contact your doctor if:  · You have a new or higher fever. · Your symptoms get worse, or you seem to get better, then get worse again. · You do not get better after 5 to 7 days. Where can you learn more? Go to Videoflot.be  Enter E742 in the search box to learn more about \"H1N1 Influenza (Swine Flu): After Your Visit. \"   © 1040-7413 [employer]. Care instructions adapted under license by [facility] (which disclaims liability or warranty for this information). This care instruction is for use with your licensed healthcare professional. If you have questions about a medical condition or this instruction, always ask your healthcare professional. [place] [address] any warranty or liability for your use of this information.   Content Version: 68.9.948535; Current as of: February 5, 2014

## 2019-04-04 ENCOUNTER — DOCUMENTATION ONLY (OUTPATIENT)
Dept: FAMILY MEDICINE CLINIC | Age: 74
End: 2019-04-04

## 2019-06-24 ENCOUNTER — OFFICE VISIT (OUTPATIENT)
Dept: FAMILY MEDICINE CLINIC | Age: 74
End: 2019-06-24

## 2019-06-24 VITALS
TEMPERATURE: 98.4 F | DIASTOLIC BLOOD PRESSURE: 65 MMHG | BODY MASS INDEX: 30.34 KG/M2 | WEIGHT: 200.2 LBS | OXYGEN SATURATION: 93 % | HEART RATE: 86 BPM | RESPIRATION RATE: 20 BRPM | HEIGHT: 68 IN | SYSTOLIC BLOOD PRESSURE: 99 MMHG

## 2019-06-24 DIAGNOSIS — H61.23 BILATERAL HEARING LOSS DUE TO CERUMEN IMPACTION: ICD-10-CM

## 2019-06-24 DIAGNOSIS — J06.9 UPPER RESPIRATORY TRACT INFECTION, UNSPECIFIED TYPE: Primary | ICD-10-CM

## 2019-06-24 NOTE — PROGRESS NOTES
1. Have you been to the ER, urgent care clinic since your last visit? Hospitalized since your last visit? No    2. Have you seen or consulted any other health care providers outside of the [address] [place] [address] since your last visit? Include any pap smears or colon screening.  No

## 2019-06-24 NOTE — PATIENT INSTRUCTIONS
You have an upper respiratory infection. It can be caused by a viruses or bacteria. If your infection is caused by a virus then an antibiotic will not make any difference in your treatment and could cause unpleasant or dangerous side effects. If your symptoms last less than 7-10 days and gradually improve then it is very likely that you have a virus. If your symptoms last longer than 7-10 days, or improve then get worse again it is likely that you have a bacterial infection. In that case then an antibiotic may help you get better quicker. For symptom relief:    Acetaminophen (Tylenol) or an NSAID (ibuprofen, Advil, Aleve, etc.; always take NSAIDs with food) to reduce pain and fever. Intranasal corticosteroids (fluticasone, Flonase, Nasonex, etc.), nasal sprays to reduce inflammation in the nasal passages    Saline nasal irrigation (Netipot, saline nasal sprays, etc.) If irrigating, be sure to use distilled, boiled, or filtered water. Decongestants (Sudafed, Mucinex-D, etc.) may help reduce symptoms of congestion. Caution: if you have high blood pressure or heart disease, avoid other decongestants and use only Coricidin HBP. Nasal decongestant sprays (Afrin) may help with congestion symptoms, but do not use longer than 3-5 days, because of the probably of getting worse congestion if used too long.

## 2019-06-28 ENCOUNTER — TELEPHONE (OUTPATIENT)
Dept: FAMILY MEDICINE CLINIC | Age: 74
End: 2019-06-28

## 2019-06-28 DIAGNOSIS — B96.89 BACTERIAL SINUSITIS: Primary | ICD-10-CM

## 2019-06-28 DIAGNOSIS — J32.9 BACTERIAL SINUSITIS: Primary | ICD-10-CM

## 2019-06-28 RX ORDER — CEFUROXIME AXETIL 250 MG/1
250 TABLET ORAL 2 TIMES DAILY
Qty: 20 TAB | Refills: 0 | Status: SHIPPED | OUTPATIENT
Start: 2019-06-28 | End: 2019-07-08

## 2019-06-28 NOTE — TELEPHONE ENCOUNTER
Pt called in and was wanting to let Rahel Smith know that her upper respiratory infection seems to be bacterial and she was wanting to know if Rahel Smith could send over some antibiotics to her pharmacy. Please advise.

## 2019-09-10 ENCOUNTER — HOSPITAL ENCOUNTER (OUTPATIENT)
Dept: MAMMOGRAPHY | Age: 74
Discharge: HOME | End: 2019-09-10
Attending: INTERNAL MEDICINE
Payer: MEDICARE

## 2019-09-10 DIAGNOSIS — Z12.31 VISIT FOR SCREENING MAMMOGRAM: ICD-10-CM

## 2019-09-10 PROCEDURE — 77063 BREAST TOMOSYNTHESIS BI: CPT

## 2019-09-19 ENCOUNTER — OFFICE VISIT (OUTPATIENT)
Dept: FAMILY MEDICINE CLINIC | Age: 74
End: 2019-09-19

## 2019-09-19 VITALS
RESPIRATION RATE: 18 BRPM | DIASTOLIC BLOOD PRESSURE: 74 MMHG | WEIGHT: 197 LBS | SYSTOLIC BLOOD PRESSURE: 120 MMHG | OXYGEN SATURATION: 95 % | HEIGHT: 68 IN | HEART RATE: 72 BPM | BODY MASS INDEX: 29.86 KG/M2 | TEMPERATURE: 97.8 F

## 2019-09-19 DIAGNOSIS — Z23 ENCOUNTER FOR IMMUNIZATION: ICD-10-CM

## 2019-09-19 DIAGNOSIS — H90.3 SENSORINEURAL HEARING LOSS (SNHL) OF BOTH EARS: Primary | ICD-10-CM

## 2019-09-19 DIAGNOSIS — J44.1 COPD WITH EXACERBATION (HCC): ICD-10-CM

## 2019-09-19 RX ORDER — BUDESONIDE AND FORMOTEROL FUMARATE DIHYDRATE 80; 4.5 UG/1; UG/1
2 AEROSOL RESPIRATORY (INHALATION) 2 TIMES DAILY
Qty: 3 INHALER | Refills: 4 | Status: SHIPPED | OUTPATIENT
Start: 2019-09-19 | End: 2020-07-29 | Stop reason: SDUPTHER

## 2019-09-19 NOTE — PROGRESS NOTES
URIEL Randolph is a 76 y.o. old female who presents for the Medicare AWV (Annual Wellness Visit). 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 90 Tab 3    ipratropium (ATROVENT HFA) 17 mcg/actuation inhaler 2 puffs every 8 hours. 3 Inhaler 4    omega-3 fatty acids-vitamin e (FISH OIL) 1,000 mg cap Take 1 capsule by mouth.  calcium-cholecalciferol, D3, (CALTRATE 600+D) tablet Take 1 tablet by mouth daily.  aspirin delayed-release 81 mg tablet Take  by mouth daily.  budesonide-formoterol (SYMBICORT) 80-4.5 mcg/actuation HFAA Take 2 Puffs by inhalation two (2) times a day. 3 Inhaler 4     No facility-administered medications prior to visit. The patient's past medical history, medications, allergies, family history and social history were reviewed in her electronic medical record at today's visit. ROS     Not done as a standard part of AWV. Physical Exam      Not done as a standard part of AWV.     Vitals:  /74 P 72 R 18 T97.8    Hearing and Visual Acuity Screening:     Hearing Screening 125Hz 250Hz 500Hz 1000Hz 2000Hz 3000Hz 4000Hz 6000Hz 8000Hz   Right ear: Fail Pass Pass  Fail     Left ear:   Pass Pass Fail  Fail        Visual Acuity Screening    Right eye Left eye Both eyes   Without correction: 20/20 20/20 20/25   With correction:        Results of the hearing and visual acuity screening were reviewed and significant findings addressed: Consult Audiology    Education/Counseling/Referrals Based on Visit        ICD-10-CM ICD-9-CM    1. Sensorineural hearing loss (SNHL) of both ears H90.3 389.18 REFERRAL TO AUDIOLOGY   2. Simple chronic bronchitis (HCC) J41.0 491.0    3. Centrilobular emphysema (HCC) J43.2 492.8    4. COPD with exacerbation ([address] J44.1 491.21 budesonide-formoterol (SYMBICORT) 80-4.5 mcg/actuation HFAA     Follow Up for CPE 01/2020    Health Maintenance reviewed    Individualized screening schedule, health care maintenance options and preventative health measures have been discussed with Clifford Gowers and written recommendations were provided to her.

## 2019-09-19 NOTE — PROGRESS NOTES
Rosemary Morley is a 76 y.o. female who presents today  c/o     1. Have you been to the ER, urgent care clinic since your last visit? Hospitalized since your last visit? NO    2. Have you seen or consulted any other health care providers outside of the [address] [place] [address] since your last visit? Include any pap smears or colon screening. NO     Health Maintenance reviewed. Health Maintenance Due   Topic Date Due    Shingrix Vaccine Age 49> (1 of 2) 07/07/1995    MEDICARE YEARLY EXAM  07/03/2019    Influenza Age 5 to Adult  08/01/2019     Obtained signed consent form from patient. Per verbal order from Dr. [facility]en[facility]- injection of FLU administered. Verified by this nurse and REBECA Landers that this is the correct immunization/injection. Patient instructed to remain in clinic for 15 minutes and to report any adverse reactions immediately. Most recent VIS given. No adverse reactions noted after 15 minutes of observation.

## 2019-11-08 DIAGNOSIS — J41.0 SIMPLE CHRONIC BRONCHITIS (HCC): ICD-10-CM

## 2020-01-06 ENCOUNTER — HOSPITAL ENCOUNTER (INPATIENT)
Age: 75
LOS: 4 days | Discharge: HOME | DRG: 189 | End: 2020-01-10
Attending: EMERGENCY MEDICINE | Admitting: HOSPITALIST
Payer: MEDICARE

## 2020-01-06 ENCOUNTER — OFFICE VISIT (OUTPATIENT)
Dept: FAMILY MEDICINE CLINIC | Age: 75
End: 2020-01-06

## 2020-01-06 ENCOUNTER — APPOINTMENT (OUTPATIENT)
Dept: GENERAL RADIOLOGY | Age: 75
DRG: 189 | End: 2020-01-06
Attending: EMERGENCY MEDICINE
Payer: MEDICARE

## 2020-01-06 VITALS
WEIGHT: 199.8 LBS | DIASTOLIC BLOOD PRESSURE: 65 MMHG | TEMPERATURE: 97.4 F | SYSTOLIC BLOOD PRESSURE: 115 MMHG | HEIGHT: 68 IN | RESPIRATION RATE: 20 BRPM | BODY MASS INDEX: 30.28 KG/M2 | HEART RATE: 95 BPM | OXYGEN SATURATION: 83 %

## 2020-01-06 DIAGNOSIS — J44.1 ACUTE EXACERBATION OF CHRONIC OBSTRUCTIVE PULMONARY DISEASE (COPD) (HCC): Primary | ICD-10-CM

## 2020-01-06 DIAGNOSIS — J44.1 COPD WITH EXACERBATION (HCC): ICD-10-CM

## 2020-01-06 DIAGNOSIS — R09.02 HYPOXIA: ICD-10-CM

## 2020-01-06 PROBLEM — E03.9 HYPOTHYROID: Status: ACTIVE | Noted: 2020-01-06

## 2020-01-06 PROBLEM — J96.01 ACUTE HYPOXEMIC RESPIRATORY FAILURE (HCC): Status: ACTIVE | Noted: 2020-01-06

## 2020-01-06 LAB
ALBUMIN SERPL-MCNC: 3.7 G/DL (ref 3.4–5)
ALBUMIN/GLOB SERPL: 0.9 {RATIO} (ref 0.8–1.7)
ALP SERPL-CCNC: 89 U/L (ref 45–117)
ALT SERPL-CCNC: 14 U/L (ref 13–56)
ANION GAP SERPL CALC-SCNC: 9 MMOL/L (ref 3–18)
APPEARANCE UR: CLEAR
ARTERIAL PATENCY WRIST A: YES
AST SERPL-CCNC: 17 U/L (ref 10–38)
BASE EXCESS BLD CALC-SCNC: 0 MMOL/L
BASOPHILS # BLD: 0 K/UL (ref 0–0.1)
BASOPHILS NFR BLD: 0 % (ref 0–2)
BDY SITE: ABNORMAL
BILIRUB SERPL-MCNC: 0.7 MG/DL (ref 0.2–1)
BILIRUB UR QL: NEGATIVE
BODY TEMPERATURE: 98.4
BUN SERPL-MCNC: 18 MG/DL (ref 7–18)
BUN/CREAT SERPL: 15 (ref 12–20)
CALCIUM SERPL-MCNC: 8.5 MG/DL (ref 8.5–10.1)
CHLORIDE SERPL-SCNC: 103 MMOL/L (ref 100–111)
CO2 SERPL-SCNC: 26 MMOL/L (ref 21–32)
COLOR UR: YELLOW
CREAT SERPL-MCNC: 1.22 MG/DL (ref 0.6–1.3)
DIFFERENTIAL METHOD BLD: ABNORMAL
EOSINOPHIL # BLD: 0 K/UL (ref 0–0.4)
EOSINOPHIL NFR BLD: 0 % (ref 0–5)
ERYTHROCYTE [DISTWIDTH] IN BLOOD BY AUTOMATED COUNT: 13.8 % (ref 11.6–14.5)
FLUAV AG NPH QL IA: NEGATIVE
FLUBV AG NOSE QL IA: NEGATIVE
GAS FLOW.O2 O2 DELIVERY SYS: ABNORMAL L/MIN
GLOBULIN SER CALC-MCNC: 4.3 G/DL (ref 2–4)
GLUCOSE SERPL-MCNC: 78 MG/DL (ref 74–99)
GLUCOSE UR STRIP.AUTO-MCNC: NEGATIVE MG/DL
HCO3 BLD-SCNC: 25.1 MMOL/L (ref 22–26)
HCT VFR BLD AUTO: 42.9 % (ref 35–45)
HGB BLD-MCNC: 14.2 G/DL (ref 12–16)
HGB UR QL STRIP: NEGATIVE
KETONES UR QL STRIP.AUTO: ABNORMAL MG/DL
LEUKOCYTE ESTERASE UR QL STRIP.AUTO: NEGATIVE
LYMPHOCYTES # BLD: 1 K/UL (ref 0.9–3.6)
LYMPHOCYTES NFR BLD: 15 % (ref 21–52)
MAGNESIUM SERPL-MCNC: 2 MG/DL (ref 1.6–2.6)
MCH RBC QN AUTO: 32.5 PG (ref 24–34)
MCHC RBC AUTO-ENTMCNC: 33.1 G/DL (ref 31–37)
MCV RBC AUTO: 98.2 FL (ref 74–97)
MONOCYTES # BLD: 0.4 K/UL (ref 0.05–1.2)
MONOCYTES NFR BLD: 5 % (ref 3–10)
NEUTS SEG # BLD: 5.3 K/UL (ref 1.8–8)
NEUTS SEG NFR BLD: 80 % (ref 40–73)
NITRITE UR QL STRIP.AUTO: NEGATIVE
O2/TOTAL GAS SETTING VFR VENT: 21 %
PCO2 BLD: 44.4 MMHG (ref 35–45)
PH BLD: 7.36 [PH] (ref 7.35–7.45)
PH UR STRIP: 7.5 [PH] (ref 5–8)
PLATELET # BLD AUTO: 192 K/UL (ref 135–420)
PMV BLD AUTO: 10.3 FL (ref 9.2–11.8)
PO2 BLD: 56 MMHG (ref 80–100)
POTASSIUM SERPL-SCNC: 4.1 MMOL/L (ref 3.5–5.5)
PROT SERPL-MCNC: 8 G/DL (ref 6.4–8.2)
PROT UR STRIP-MCNC: NEGATIVE MG/DL
RBC # BLD AUTO: 4.37 M/UL (ref 4.2–5.3)
SAO2 % BLD: 88 % (ref 92–97)
SERVICE CMNT-IMP: ABNORMAL
SODIUM SERPL-SCNC: 138 MMOL/L (ref 136–145)
SP GR UR REFRACTOMETRY: 1.02 (ref 1–1.03)
SPECIMEN TYPE: ABNORMAL
TOTAL RESP. RATE, ITRR: 24
TROPONIN I SERPL-MCNC: <0.02 NG/ML (ref 0–0.04)
UROBILINOGEN UR QL STRIP.AUTO: 0.2 EU/DL (ref 0.2–1)
WBC # BLD AUTO: 6.6 K/UL (ref 4.6–13.2)

## 2020-01-06 PROCEDURE — 87804 INFLUENZA ASSAY W/OPTIC: CPT

## 2020-01-06 PROCEDURE — 36600 WITHDRAWAL OF ARTERIAL BLOOD: CPT

## 2020-01-06 PROCEDURE — 74011250636 HC RX REV CODE- 250/636: Performed by: EMERGENCY MEDICINE

## 2020-01-06 PROCEDURE — 96375 TX/PRO/DX INJ NEW DRUG ADDON: CPT

## 2020-01-06 PROCEDURE — 83735 ASSAY OF MAGNESIUM: CPT

## 2020-01-06 PROCEDURE — 74011250636 HC RX REV CODE- 250/636: Performed by: HOSPITALIST

## 2020-01-06 PROCEDURE — 65660000000 HC RM CCU STEPDOWN

## 2020-01-06 PROCEDURE — 84484 ASSAY OF TROPONIN QUANT: CPT

## 2020-01-06 PROCEDURE — 80053 COMPREHEN METABOLIC PANEL: CPT

## 2020-01-06 PROCEDURE — 85025 COMPLETE CBC W/AUTO DIFF WBC: CPT

## 2020-01-06 PROCEDURE — 82803 BLOOD GASES ANY COMBINATION: CPT

## 2020-01-06 PROCEDURE — 81003 URINALYSIS AUTO W/O SCOPE: CPT

## 2020-01-06 PROCEDURE — 74011000250 HC RX REV CODE- 250: Performed by: EMERGENCY MEDICINE

## 2020-01-06 PROCEDURE — 93005 ELECTROCARDIOGRAM TRACING: CPT

## 2020-01-06 PROCEDURE — 87040 BLOOD CULTURE FOR BACTERIA: CPT

## 2020-01-06 PROCEDURE — 94640 AIRWAY INHALATION TREATMENT: CPT

## 2020-01-06 PROCEDURE — 77010033678 HC OXYGEN DAILY

## 2020-01-06 PROCEDURE — 96374 THER/PROPH/DIAG INJ IV PUSH: CPT

## 2020-01-06 PROCEDURE — 71045 X-RAY EXAM CHEST 1 VIEW: CPT

## 2020-01-06 PROCEDURE — 99285 EMERGENCY DEPT VISIT HI MDM: CPT

## 2020-01-06 PROCEDURE — 74011250637 HC RX REV CODE- 250/637: Performed by: EMERGENCY MEDICINE

## 2020-01-06 RX ORDER — ACETAMINOPHEN 500 MG
1000 TABLET ORAL
Status: COMPLETED | OUTPATIENT
Start: 2020-01-06 | End: 2020-01-06

## 2020-01-06 RX ORDER — SODIUM CHLORIDE 0.9 % (FLUSH) 0.9 %
5-40 SYRINGE (ML) INJECTION EVERY 8 HOURS
Status: DISCONTINUED | OUTPATIENT
Start: 2020-01-06 | End: 2020-01-07

## 2020-01-06 RX ORDER — ENOXAPARIN SODIUM 100 MG/ML
40 INJECTION SUBCUTANEOUS EVERY 24 HOURS
Status: DISCONTINUED | OUTPATIENT
Start: 2020-01-06 | End: 2020-01-11 | Stop reason: HOSPADM

## 2020-01-06 RX ORDER — ARFORMOTEROL TARTRATE 15 UG/2ML
15 SOLUTION RESPIRATORY (INHALATION)
Status: DISCONTINUED | OUTPATIENT
Start: 2020-01-06 | End: 2020-01-11 | Stop reason: HOSPADM

## 2020-01-06 RX ORDER — SODIUM CHLORIDE 0.9 % (FLUSH) 0.9 %
5-40 SYRINGE (ML) INJECTION AS NEEDED
Status: DISCONTINUED | OUTPATIENT
Start: 2020-01-06 | End: 2020-01-11 | Stop reason: HOSPADM

## 2020-01-06 RX ORDER — SODIUM CHLORIDE 0.9 % (FLUSH) 0.9 %
5-40 SYRINGE (ML) INJECTION AS NEEDED
Status: DISCONTINUED | OUTPATIENT
Start: 2020-01-06 | End: 2020-01-06 | Stop reason: SDUPTHER

## 2020-01-06 RX ORDER — ASPIRIN 81 MG/1
81 TABLET ORAL DAILY
Status: DISCONTINUED | OUTPATIENT
Start: 2020-01-07 | End: 2020-01-11 | Stop reason: HOSPADM

## 2020-01-06 RX ORDER — IPRATROPIUM BROMIDE AND ALBUTEROL SULFATE 2.5; .5 MG/3ML; MG/3ML
3 SOLUTION RESPIRATORY (INHALATION)
Status: DISCONTINUED | OUTPATIENT
Start: 2020-01-06 | End: 2020-01-11 | Stop reason: HOSPADM

## 2020-01-06 RX ORDER — MAGNESIUM SULFATE HEPTAHYDRATE 40 MG/ML
2 INJECTION, SOLUTION INTRAVENOUS ONCE
Status: COMPLETED | OUTPATIENT
Start: 2020-01-06 | End: 2020-01-06

## 2020-01-06 RX ORDER — LEVOTHYROXINE SODIUM 25 UG/1
50 TABLET ORAL
Status: DISCONTINUED | OUTPATIENT
Start: 2020-01-07 | End: 2020-01-11 | Stop reason: HOSPADM

## 2020-01-06 RX ORDER — SODIUM CHLORIDE 0.9 % (FLUSH) 0.9 %
5-40 SYRINGE (ML) INJECTION EVERY 8 HOURS
Status: DISCONTINUED | OUTPATIENT
Start: 2020-01-06 | End: 2020-01-11 | Stop reason: HOSPADM

## 2020-01-06 RX ORDER — BUDESONIDE 0.5 MG/2ML
500 INHALANT ORAL
Status: DISCONTINUED | OUTPATIENT
Start: 2020-01-06 | End: 2020-01-11 | Stop reason: HOSPADM

## 2020-01-06 RX ORDER — ACETAMINOPHEN 325 MG/1
650 TABLET ORAL
Status: DISCONTINUED | OUTPATIENT
Start: 2020-01-06 | End: 2020-01-11 | Stop reason: HOSPADM

## 2020-01-06 RX ORDER — IPRATROPIUM BROMIDE AND ALBUTEROL SULFATE 2.5; .5 MG/3ML; MG/3ML
3 SOLUTION RESPIRATORY (INHALATION)
Status: COMPLETED | OUTPATIENT
Start: 2020-01-06 | End: 2020-01-06

## 2020-01-06 RX ORDER — IPRATROPIUM BROMIDE AND ALBUTEROL SULFATE 2.5; .5 MG/3ML; MG/3ML
3 SOLUTION RESPIRATORY (INHALATION)
Status: DISPENSED | OUTPATIENT
Start: 2020-01-06 | End: 2020-01-06

## 2020-01-06 RX ADMIN — METHYLPREDNISOLONE SODIUM SUCCINATE 125 MG: 125 INJECTION, POWDER, FOR SOLUTION INTRAMUSCULAR; INTRAVENOUS at 15:36

## 2020-01-06 RX ADMIN — IPRATROPIUM BROMIDE AND ALBUTEROL SULFATE 3 ML: .5; 3 SOLUTION RESPIRATORY (INHALATION) at 18:36

## 2020-01-06 RX ADMIN — METHYLPREDNISOLONE SODIUM SUCCINATE 60 MG: 125 INJECTION, POWDER, FOR SOLUTION INTRAMUSCULAR; INTRAVENOUS at 23:54

## 2020-01-06 RX ADMIN — IPRATROPIUM BROMIDE AND ALBUTEROL SULFATE 3 ML: .5; 3 SOLUTION RESPIRATORY (INHALATION) at 16:47

## 2020-01-06 RX ADMIN — Medication 10 ML: at 21:00

## 2020-01-06 RX ADMIN — Medication 10 ML: at 15:14

## 2020-01-06 RX ADMIN — IPRATROPIUM BROMIDE AND ALBUTEROL SULFATE 3 ML: .5; 3 SOLUTION RESPIRATORY (INHALATION) at 17:41

## 2020-01-06 RX ADMIN — IPRATROPIUM BROMIDE AND ALBUTEROL SULFATE 3 ML: .5; 3 SOLUTION RESPIRATORY (INHALATION) at 15:24

## 2020-01-06 RX ADMIN — IPRATROPIUM BROMIDE AND ALBUTEROL SULFATE 3 ML: .5; 3 SOLUTION RESPIRATORY (INHALATION) at 15:57

## 2020-01-06 RX ADMIN — ENOXAPARIN SODIUM 40 MG: 40 INJECTION SUBCUTANEOUS at 23:40

## 2020-01-06 RX ADMIN — MAGNESIUM SULFATE HEPTAHYDRATE 2 G: 40 INJECTION, SOLUTION INTRAVENOUS at 17:48

## 2020-01-06 RX ADMIN — ACETAMINOPHEN 1000 MG: 500 TABLET, FILM COATED ORAL at 17:39

## 2020-01-06 RX ADMIN — AZITHROMYCIN MONOHYDRATE 500 MG: 500 INJECTION, POWDER, LYOPHILIZED, FOR SOLUTION INTRAVENOUS at 23:54

## 2020-01-06 NOTE — ED TRIAGE NOTES
\"I was at my doctor's office and they sent me over here for a chest xray because my oxygen was low. I have been sick with cold and flu symptoms. \"

## 2020-01-06 NOTE — ED NOTES
Assumed care of pt. Pt is A&OX4. Is ambulatory. Breathing is spontaneous but labored. Pt is c/o SOB. Diminished lung sounds with wheezing present. Sats w/o O2 75%. Placed pt on NC and titrated up to 5L/min NC to improve SPO2 sats to 90%. Pt has productive cough with yellow mucus present and reports low grade fever with SOB X 5 days. States she feels like she has the flu and that  She is only SOB when she contacts a resp infx. Denies NV or CP. Prior smoker. Went to PCP today and was sent here for low SP02. Reports recent dx of COPD. Pt denies dyspnea on exertion. Up to bathroom steadily to provide urine sample. Mucus membranes are pink and moist. Skin is warm and dry. No retractions noted. Pt on full cardiac monitor. NSR present. NAD noted.   VVS.

## 2020-01-06 NOTE — H&P
Internal Medicine History and Physical          Subjective     HPI: Keyur Dick is a 76 y.o. female with a PMHx of COPD, hypothyroidism who presented to the ED from PCP with hypoxia and SOB. Patient states new onset cough about 5 days ago with productive yellow sputum. Then, about 2 days ago, she developed SOB. She uses inhalers at home but nothing improved. She went to her PCP's office for sick visit and was sent to ED due to O2 levels in the 80s. She denies and fever or chills. She denied any associated chest pain. In the ED, she was found to be persistently hypoxic despite breathing treatments. Her oxygen improved with 3-5L O2. CXR was negative for PNA but showed evidence of emphysema. PMHx:  Past Medical History:   Diagnosis Date    Acute bronchitis 12/28/2015    Advance directive discussed with patient 5/4/2016    Pt would like to appoint her son, Florinda Walton as her medical decision maker in the event that she can no longer do so. Phone number is [phone]. Her secondary person is her [facility]. Phone number is [phone]. If her death is imminent and medical treatment will not help her recover, pt does want life prolonging measures.   If her condition makes her unawar    Bilateral leg pain 10/22/2015    Elevated TSH 10/22/2015    Hashimoto's thyroiditis 12/28/2015    Hernia, abdominal     History of benign breast tumor     Menopause     Nicotine dependence in remission 2/4/2016    Obesity, Class I, BMI 30-34.9 2/4/2016    Prediabetes 10/22/2015    Simple chronic bronchitis ([address] 5/4/2016    Skin lesion 2/4/2016    Varicose vein of leg        PSurgHx:  Past Surgical History:   Procedure Laterality Date    HX BREAST BIOPSY Right     Milk ducts removed    HX CYST INCISION AND DRAINAGE Right     35 y/o    HX HERNIA REPAIR      abdominal x2    HX HERNIA REPAIR  09/15/2016    ROBOTIC REPAIR OF RECURRENT INCARCERATED HERNIA WITH EXTENSIVE LYSIS OF ADHESIONS WITH PLACEMENT OF MESH       SocialHx:  Social History     Socioeconomic History    Marital status: SINGLE     Spouse name: Not on file    Number of children: Not on file    Years of education: Not on file    Highest education level: Not on file   Occupational History    Not on file   Social Needs    Financial resource strain: Not on file    Food insecurity:     Worry: Not on file     Inability: Not on file    Transportation needs:     Medical: Not on file     Non-medical: Not on file   Tobacco Use    Smoking status: Former Smoker     Packs/day: 1.00     Years: 30.00     Pack years: 30.00     Last attempt to quit: 1992     Years since quittin.0    Smokeless tobacco: Never Used   Substance and Sexual Activity    Alcohol use:  Yes     Alcohol/week: 2.0 standard drinks     Types: 2 Glasses of wine per week     Comment: Occasionally     Drug use: No    Sexual activity: Not Currently   Lifestyle    Physical activity:     Days per week: Not on file     Minutes per session: Not on file    Stress: Not on file   Relationships    Social connections:     Talks on phone: Not on file     Gets together: Not on file     Attends Baptism service: Not on file     Active member of club or organization: Not on file     Attends meetings of clubs or organizations: Not on file     Relationship status: Not on file    Intimate partner violence:     Fear of current or ex partner: Not on file     Emotionally abused: Not on file     Physically abused: Not on file     Forced sexual activity: Not on file   Other Topics Concern    Not on file   Social History Narrative    Not on file       Allergies:  No Known Allergies    FamilyHx:  Family History   Problem Relation Age of Onset    Breast Cancer Mother 36        43s    Lung Disease Mother     Elevated Lipids Mother     Stroke Father     Diabetes Paternal Grandfather        Prior to Admission Medications   Prescriptions Last Dose Informant Patient Reported? Taking?   aspirin delayed-release 81 mg tablet Not Taking at Unknown time  Yes No   Sig: Take  by mouth daily. budesonide-formoterol (SYMBICORT) 80-4.5 mcg/actuation HFAA 2020 at Unknown time  No Yes   Sig: Take 2 Puffs by inhalation two (2) times a day. calcium-cholecalciferol, D3, (CALTRATE 600+D) tablet Not Taking at Unknown time  Yes No   Sig: Take 1 tablet by mouth daily. ipratropium (ATROVENT HFA) 17 mcg/actuation inhaler 2020 at Unknown time  No Yes   Si puffs every 8 hours. levothyroxine (SYNTHROID) 50 mcg tablet 2020 at Unknown time  No Yes   Sig: Take 1 Tab by mouth Daily (before breakfast). omega-3 fatty acids-vitamin e (FISH OIL) 1,000 mg cap Not Taking at Unknown time  Yes No   Sig: Take 1 capsule by mouth.       Facility-Administered Medications: None       Review of Systems:  CONST: Fever, weight loss, fatigue or chills  HEENT: Recent changes in vision, vertigo, epistaxis, dysphagia and hoarseness  CV: Chest pain, palpitations, edema and varicosities  RESP: Cough, shortness of breath, wheezing, hemoptysis, snoring and reactive airway disease  GI: Nausea, vomiting, abdominal pain, change in bowel habits, hematochezia, melena, and GERD   : Hematuria, dysuria, frequency, urgency, nocturia and stress urinary incontinence   MS: Weakness, joint pain and arthritis  ENDO: Polyuria, polydipsia, polyphagia, poor wound healing, heat intolerance, cold intolerance  LYMPH/HEME: Anemia, bruising and history of blood transfusions  INTEG: Dermatitis, abnormal moles  NEURO: Dizziness, headache, fainting, seizures and stroke  PSYCH: Anxiety and depression      Objective      Visit Vitals  /50   Pulse 100   Temp 98.4 °F (36.9 °C)   Resp 22   SpO2 (!) 89%       Physical Exam:  General Appearance: NAD, conversant  HENT: normocephalic/atraumatic, moist mucus membranes  Lungs: Coarse w/ decreased BS B/L  Cardiovascular: RRR, no m/r/g, capillary refill < 2 sec, B/L DP/PT pulses +3/4  Abdomen: soft, non-tender, normal bowel sounds  Extremities: no cyanosis, no peripheral edema  Neuro: moves all extremities, no focal deficits  Psych: appropriate affect, alert and oriented to person, place and time    Laboratory Studies:  BMP:   Lab Results   Component Value Date/Time     01/06/2020 03:15 PM    K 4.1 01/06/2020 03:15 PM     01/06/2020 03:15 PM    CO2 26 01/06/2020 03:15 PM    AGAP 9 01/06/2020 03:15 PM    GLU 78 01/06/2020 03:15 PM    BUN 18 01/06/2020 03:15 PM    CREA 1.22 01/06/2020 03:15 PM    GFRAA 52 (L) 01/06/2020 03:15 PM    GFRNA 43 (L) 01/06/2020 03:15 PM     CBC:   Lab Results   Component Value Date/Time    WBC 6.6 01/06/2020 03:15 PM    HGB 14.2 01/06/2020 03:15 PM    HCT 42.9 01/06/2020 03:15 PM     01/06/2020 03:15 PM     All Cardiac Markers in the last 24 hours:   Lab Results   Component Value Date/Time    TROIQ <0.02 01/06/2020 03:15 PM       Imaging Reviewed:  [facility] Chest Port    Result Date: 1/6/2020  EXAM: Portable chest radiograph 1/6/2020 CLINICAL INDICATION/HISTORY: COPD exacerbation. Cough for one week. TECHNIQUE: A semierect portable radiograph was obtained. COMPARISON: Portable x-ray from 3/7/2018. FINDINGS: The cardiac mediastinal contours are unchanged. Dense vascular calcifications are again noted in the aortic knob. Changes in the vascular pattern are noted, consistent with underlying emphysema. Biapical pleuroparenchymal scarring is suggested but was not present on the prior exam. There is also the suggestion of a possible subcentimeter nodule in the apical segment of the right upper lobe which is partially obscured by an overlying EKG leads and could, therefore, the artifactual.  Patchy atelectasis and/or fibrosis are noted at the lung bases. The lungs are otherwise clear. There is no pneumothorax or pleural effusion. IMPRESSION:  1.  Chronic lung disease with increasing biapical pleuroparenchymal scarring and with a questionable subcentimeter nodule in the right upper lobe. Nonemergent CT of the chest is suggested, as clinically warranted for further evaluation. 2.  No acute findings are suggested. Assessment/Plan     Active Hospital Problems    Diagnosis Date Noted    Acute hypoxemic respiratory failure ([facility] 01/06/2020    Hypothyroid 01/06/2020    Acute bronchitis 03/08/2018    COPD with exacerbation ([facility] 03/07/2018     - Likely 2/2 acute bacterial bronchitis  - IV steroids  - IV azithro  - Duonebs around clock  - Brovana/pulmicort  - Titrate O2 to keep above 88  - Will need CT chest outpatient eval nodule  - Cont acceptable home medications for chronic conditions   - DVT protocol    I have personally reviewed all pertinent labs, films and EKGs that have officially resulted. I reviewed available electronic documentation outlining the initial presentation as well as the emergency room physician's encounter.     Lauren Fletcher DO  Internal Medicine, Hospitalist  Pager: [phone] [facility]

## 2020-01-06 NOTE — PROGRESS NOTES
Mrs. Rachele Romero presents today for COUGH for 1 week. No FEVER or chills. Denies pleurisy. Mrs. Rachele Romero has a history of COPD, currently on Symbicort and Atrovent PRN. O2 SAT-83%. No O2. No Increase Work of Breaking. No Murmurs, Normal S1, S2. Lungs Hyperresonant. Will refer to ED for CXR and further evaluation, suspect Hypercapnic, Hypoxic COPD Exacerbation. Choose to go by Car rather then escort or supervision.

## 2020-01-06 NOTE — ED NOTES
Pt still coughing frequently. Resting in NAD.  VVS except for her SPO2 holding at 87% on [facility]

## 2020-01-07 LAB
ATRIAL RATE: 91 BPM
CALCULATED P AXIS, ECG09: 79 DEGREES
CALCULATED R AXIS, ECG10: 116 DEGREES
CALCULATED T AXIS, ECG11: 19 DEGREES
DIAGNOSIS, 93000: NORMAL
P-R INTERVAL, ECG05: 148 MS
Q-T INTERVAL, ECG07: 376 MS
QRS DURATION, ECG06: 94 MS
QTC CALCULATION (BEZET), ECG08: 462 MS
VENTRICULAR RATE, ECG03: 91 BPM

## 2020-01-07 PROCEDURE — 74011250637 HC RX REV CODE- 250/637: Performed by: HOSPITALIST

## 2020-01-07 PROCEDURE — 77010033678 HC OXYGEN DAILY

## 2020-01-07 PROCEDURE — 94640 AIRWAY INHALATION TREATMENT: CPT

## 2020-01-07 PROCEDURE — 74011000250 HC RX REV CODE- 250: Performed by: HOSPITALIST

## 2020-01-07 PROCEDURE — 74011250636 HC RX REV CODE- 250/636: Performed by: HOSPITALIST

## 2020-01-07 PROCEDURE — 65660000000 HC RM CCU STEPDOWN

## 2020-01-07 PROCEDURE — 94761 N-INVAS EAR/PLS OXIMETRY MLT: CPT

## 2020-01-07 PROCEDURE — 74011250637 HC RX REV CODE- 250/637: Performed by: INTERNAL MEDICINE

## 2020-01-07 RX ORDER — GUAIFENESIN 600 MG/1
1200 TABLET, EXTENDED RELEASE ORAL EVERY 12 HOURS
Status: DISCONTINUED | OUTPATIENT
Start: 2020-01-07 | End: 2020-01-11 | Stop reason: HOSPADM

## 2020-01-07 RX ORDER — BENZONATATE 100 MG/1
100 CAPSULE ORAL
Status: DISCONTINUED | OUTPATIENT
Start: 2020-01-07 | End: 2020-01-11 | Stop reason: HOSPADM

## 2020-01-07 RX ORDER — CODEINE PHOSPHATE AND GUAIFENESIN 10; 100 MG/5ML; MG/5ML
10 SOLUTION ORAL
Status: DISCONTINUED | OUTPATIENT
Start: 2020-01-07 | End: 2020-01-11 | Stop reason: HOSPADM

## 2020-01-07 RX ADMIN — IPRATROPIUM BROMIDE AND ALBUTEROL SULFATE 3 ML: .5; 3 SOLUTION RESPIRATORY (INHALATION) at 00:32

## 2020-01-07 RX ADMIN — IPRATROPIUM BROMIDE AND ALBUTEROL SULFATE 3 ML: .5; 3 SOLUTION RESPIRATORY (INHALATION) at 11:20

## 2020-01-07 RX ADMIN — Medication 10 ML: at 22:05

## 2020-01-07 RX ADMIN — GUAIFENESIN 1200 MG: 600 TABLET, EXTENDED RELEASE ORAL at 20:55

## 2020-01-07 RX ADMIN — ARFORMOTEROL TARTRATE 15 MCG: 15 SOLUTION RESPIRATORY (INHALATION) at 21:44

## 2020-01-07 RX ADMIN — BUDESONIDE 500 MCG: 0.5 SUSPENSION RESPIRATORY (INHALATION) at 21:44

## 2020-01-07 RX ADMIN — ARFORMOTEROL TARTRATE 15 MCG: 15 SOLUTION RESPIRATORY (INHALATION) at 08:23

## 2020-01-07 RX ADMIN — METHYLPREDNISOLONE SODIUM SUCCINATE 60 MG: 125 INJECTION, POWDER, FOR SOLUTION INTRAMUSCULAR; INTRAVENOUS at 06:19

## 2020-01-07 RX ADMIN — ACETAMINOPHEN 650 MG: 325 TABLET, FILM COATED ORAL at 04:39

## 2020-01-07 RX ADMIN — METHYLPREDNISOLONE SODIUM SUCCINATE 60 MG: 125 INJECTION, POWDER, FOR SOLUTION INTRAMUSCULAR; INTRAVENOUS at 23:07

## 2020-01-07 RX ADMIN — GUAIFENESIN 1200 MG: 600 TABLET, EXTENDED RELEASE ORAL at 13:08

## 2020-01-07 RX ADMIN — AZITHROMYCIN MONOHYDRATE 500 MG: 500 INJECTION, POWDER, LYOPHILIZED, FOR SOLUTION INTRAVENOUS at 22:20

## 2020-01-07 RX ADMIN — IPRATROPIUM BROMIDE AND ALBUTEROL SULFATE 3 ML: .5; 3 SOLUTION RESPIRATORY (INHALATION) at 04:01

## 2020-01-07 RX ADMIN — IPRATROPIUM BROMIDE AND ALBUTEROL SULFATE 3 ML: .5; 3 SOLUTION RESPIRATORY (INHALATION) at 15:58

## 2020-01-07 RX ADMIN — BUDESONIDE 500 MCG: 0.5 SUSPENSION RESPIRATORY (INHALATION) at 08:23

## 2020-01-07 RX ADMIN — GUAIFENESIN AND CODEINE PHOSPHATE 10 ML: 100; 10 SOLUTION ORAL at 23:00

## 2020-01-07 RX ADMIN — ACETAMINOPHEN 650 MG: 325 TABLET, FILM COATED ORAL at 13:10

## 2020-01-07 RX ADMIN — BENZONATATE 100 MG: 100 CAPSULE ORAL at 20:56

## 2020-01-07 RX ADMIN — LEVOTHYROXINE SODIUM 50 MCG: 0.03 TABLET ORAL at 06:19

## 2020-01-07 RX ADMIN — Medication 10 ML: at 06:19

## 2020-01-07 RX ADMIN — IPRATROPIUM BROMIDE AND ALBUTEROL SULFATE 3 ML: .5; 3 SOLUTION RESPIRATORY (INHALATION) at 08:23

## 2020-01-07 RX ADMIN — METHYLPREDNISOLONE SODIUM SUCCINATE 60 MG: 125 INJECTION, POWDER, FOR SOLUTION INTRAMUSCULAR; INTRAVENOUS at 12:38

## 2020-01-07 RX ADMIN — ENOXAPARIN SODIUM 40 MG: 40 INJECTION SUBCUTANEOUS at 21:00

## 2020-01-07 RX ADMIN — Medication 10 ML: at 14:00

## 2020-01-07 RX ADMIN — METHYLPREDNISOLONE SODIUM SUCCINATE 60 MG: 125 INJECTION, POWDER, FOR SOLUTION INTRAMUSCULAR; INTRAVENOUS at 18:05

## 2020-01-07 RX ADMIN — IPRATROPIUM BROMIDE AND ALBUTEROL SULFATE 3 ML: .5; 3 SOLUTION RESPIRATORY (INHALATION) at 21:44

## 2020-01-07 NOTE — PROGRESS NOTES
2040 1/6/20  TRANSFER - IN REPORT:    Pt being received from ED (unit) for routine progression of care      Report consisted of patients Situation, Background, Assessment and   Recommendations(SBAR). Information from the following report(s) SBAR, Procedure Summary, MAR and Recent Results was reviewed with the receiving nurse. Opportunity for questions and clarification was provided. Assessment completed upon patients arrival to unit and care assumed. Required documentation completed    Patient alert and oriented x4. BELL with frequent non-productive cough. Some yellow sputum produced. Resting comfortably in bed. Reviewed valuable and wallet/cash placed in valuables bag to be secured in safe. Patient contacted emergency contact Ramirez Peterson and informed her of admission and current room number. Patient declined to have raul Hansen contacted by staff. 0000  Shift reassessment, pt condition unchanged, will continue to monitor. 0400  Shift reassessment, pt condition unchanged, will continue to monitor. 0730 Bedside, Verbal and Written shift change report [id number] nurse) by Juju Marr RN (offgoing nurse). Report included the following information SBAR, Kardex, Intake/Output, MAR and Recent Results. Skin assessment completed.

## 2020-01-07 NOTE — ED NOTES
TRANSFER - OUT REPORT:    Verbal report given to Chun Chua RN(name) on Emile Hugo  being transferred to [address] [place] [address] for routine progression of care       Report consisted of patients Situation, Background, Assessment and   Recommendations(SBAR). Information from the following report(s) SBAR, Kardex, ED Summary, MAR, Recent Results and Med Rec Status was reviewed with the receiving nurse. Lines:   Peripheral IV 01/06/20 Right Antecubital (Active)   Site Assessment Clean, dry, & intact 1/6/2020  3:20 PM   Phlebitis Assessment 0 1/6/2020  3:20 PM   Infiltration Assessment 0 1/6/2020  3:20 PM   Dressing Status Clean, dry, & intact 1/6/2020  3:20 PM   Dressing Type Transparent 1/6/2020  3:20 PM   Hub Color/Line Status Pink 1/6/2020  3:20 PM        Opportunity for questions and clarification was provided.       Patient transported with:   O2 @ 4 liters  Registered Nurse  [facility]

## 2020-01-07 NOTE — PROGRESS NOTES
Problem: Falls - Risk of  Goal: *Absence of Falls  Description  Document Steffany Stewart Fall Risk and appropriate interventions in the flowsheet.   Outcome: Progressing Towards Goal  Note: Fall Risk Interventions:            Medication Interventions: Patient to call before getting OOB                   Problem: Patient Education: Go to Patient Education Activity  Goal: Patient/Family Education  Outcome: Progressing Towards Goal     Problem: Gas Exchange - Impaired  Goal: *Absence of hypoxia  Outcome: Progressing Towards Goal     Problem: Pain  Goal: *Control of Pain  Outcome: Progressing Towards Goal     Problem: Breathing Pattern - Ineffective  Goal: *Absence of hypoxia  Outcome: Progressing Towards Goal  Goal: *Use of effective breathing techniques  Outcome: Progressing Towards Goal  Goal: *PALLIATIVE CARE:  Alleviation of Dyspnea  Outcome: Progressing Towards Goal     Problem: Discharge Planning  Goal: *Discharge to safe environment  Outcome: Progressing Towards Goal

## 2020-01-07 NOTE — PROGRESS NOTES
Problem: Discharge Planning  Goal: *Discharge to safe environment  Outcome: Progressing Towards Goal      Care Management Interventions  PCP Verified by CM: Yes  Palliative Care Criteria Met (RRAT>21 & CHF Dx)?: No  Transition of Care Consult (CM Consult): Discharge Planning  Discharge Location  Discharge Placement: Home     Reason for Admission:   Acute hypoxemic respiratory failure                   RRAT Score:      12               Plan for utilizing home health: If indicated                      Current Advanced Directive/Advance Care Plan: On file                         Transition of Care Plan:      Verified her demographics, insurance and PCP as correct on the facesheet. Independent prior to admit  No DME. Patient has designated _  Cristina Zavaleta [phone]     [facility] [phone]       _______________________ to participate in his/her discharge plan and to receive any needed information.      Plan at this time is home

## 2020-01-07 NOTE — PROGRESS NOTES
0725.Bedside and Verbal shift change report given to this nurse Ciara Sunshine (oncoming nurse) by Velvet Nelson (offgoing nurse). Report included the following information SBAR, Kardex and MAR.     1950. Bedside and Verbal shift change report given to Dustin Richmond (oncoming nurse) by this nurse Ciara Sunshine (offgoing nurse). Report included the following information SBAR, Kardex and MAR.

## 2020-01-07 NOTE — PROGRESS NOTES
Problem: Breathing Pattern - Ineffective  Goal: *Absence of hypoxia  Outcome: Progressing Towards Goal   Respiratory Therapy Assessment Care Plan    Patient:  Link Christopher 76 y.o. female 1/7/2020 8:26 AM    Acute hypoxemic respiratory failure ([address] [J96.01]      Chest X-RAY:   Results from Hospital Encounter encounter on 01/06/20   XR CHEST PORT    Impression IMPRESSION:     1. Chronic lung disease with increasing biapical pleuroparenchymal scarring and  with a questionable subcentimeter nodule in the right upper lobe. Nonemergent  CT of the chest is suggested, as clinically warranted for further evaluation. 2.  No acute findings are suggested. Results from [place] encounter on 03/07/18   XR CHEST PORT    Impression Impression:    1. Patchy infiltrates at both lung bases.    Results from [place] encounter on 08/19/16   XR ABD (AP AND ERECT OR DECUB)    Impression IMPRESSION:    Nonobstructive bowel gas pattern            Vital Signs:   Visit Vitals  /63 (BP 1 Location: Right arm, BP Patient Position: At rest)   Pulse 82   Temp 98.2 °F (36.8 °C)   Resp 18   SpO2 92%         Indications for treatment: HIS COPD      Plan of care: Nebs        Goal: Improve WOB

## 2020-01-07 NOTE — PROGRESS NOTES
Problem: Falls - Risk of  Goal: *Absence of Falls  Description  Document Steffanytrinity Stewart Fall Risk and appropriate interventions in the flowsheet.   Outcome: Progressing Towards Goal  Note: Fall Risk Interventions:            Medication Interventions: Patient to call before getting OOB                   Problem: Patient Education: Go to Patient Education Activity  Goal: Patient/Family Education  Outcome: Progressing Towards Goal     Problem: Gas Exchange - Impaired  Goal: *Absence of hypoxia  Outcome: Progressing Towards Goal     Problem: Pain  Goal: *Control of Pain  Outcome: Progressing Towards Goal

## 2020-01-07 NOTE — PROGRESS NOTES
Internal Medicine Progress Note    Patient's Name: Andrew Davalos Date: 1/6/2020  Length of Stay: 1      Assessment/Plan     Active Hospital Problems    Diagnosis Date Noted    Acute hypoxemic respiratory failure ([address] 01/06/2020    Hypothyroid 01/06/2020    Acute bronchitis 03/08/2018    COPD with exacerbation (HCC) 03/07/2018     - Cont IV steroids  - Cont IV azithromycin  - Cont nebs around clock  - Cont O2, titrate to keep above 88%  - Add tessalon and mucinex  - May be able to d/c isrrael if cont to improve  - Cont acceptable home medications for chronic conditions   - DVT protocol    I have personally reviewed all pertinent labs and films that have officially resulted over the last 24 hours. I have personally checked for all pending labs that are awaiting final results.     Subjective     Pt s/e @ bedside  No major events overnight  Feeling better today  Still w/ cough  Denies CP or SOB    Objective     Visit Vitals  BP 96/53 (BP 1 Location: Right arm, BP Patient Position: At rest)   Pulse 80   Temp 98.1 °F (36.7 °C)   Resp 18   Wt 90.4 kg (199 lb 4.8 oz)   SpO2 94%   BMI 30.30 kg/m²       Physical Exam:  General Appearance: NAD, conversant  Lungs: Decreased BS B/L, no wheeze  CV: RRR, no m/r/g  Abdomen: soft, non-tender, normal bowel sounds  Extremities: no cyanosis, no peripheral edema  Neuro: No focal deficits, motor/sensory intact    Lab/Data Reviewed:  BMP:   Lab Results   Component Value Date/Time     01/06/2020 03:15 PM    K 4.1 01/06/2020 03:15 PM     01/06/2020 03:15 PM    CO2 26 01/06/2020 03:15 PM    AGAP 9 01/06/2020 03:15 PM    GLU 78 01/06/2020 03:15 PM    BUN 18 01/06/2020 03:15 PM    CREA 1.22 01/06/2020 03:15 PM    GFRAA 52 (L) 01/06/2020 03:15 PM    GFRNA 43 (L) 01/06/2020 03:15 PM     CBC:   Lab Results   Component Value Date/Time    WBC 6.6 01/06/2020 03:15 PM    HGB 14.2 01/06/2020 03:15 PM    HCT 42.9 01/06/2020 03:15 PM     01/06/2020 03:15 PM Imaging Reviewed:  Xr Chest Port    Result Date: 1/6/2020  EXAM: Portable chest radiograph 1/6/2020 CLINICAL INDICATION/HISTORY: COPD exacerbation. Cough for one week. TECHNIQUE: A semierect portable radiograph was obtained. COMPARISON: Portable x-ray from 3/7/2018. FINDINGS: The cardiac mediastinal contours are unchanged. Dense vascular calcifications are again noted in the aortic knob. Changes in the vascular pattern are noted, consistent with underlying emphysema. Biapical pleuroparenchymal scarring is suggested but was not present on the prior exam. There is also the suggestion of a possible subcentimeter nodule in the apical segment of the right upper lobe which is partially obscured by an overlying EKG leads and could, therefore, the artifactual.  Patchy atelectasis and/or fibrosis are noted at the lung bases. The lungs are otherwise clear. There is no pneumothorax or pleural effusion. IMPRESSION:  1. Chronic lung disease with increasing biapical pleuroparenchymal scarring and with a questionable subcentimeter nodule in the right upper lobe. Nonemergent CT of the chest is suggested, as clinically warranted for further evaluation. 2.  No acute findings are suggested.       Medications Reviewed:  Current Facility-Administered Medications   Medication Dose Route Frequency    benzonatate (TESSALON) capsule 100 mg  100 mg Oral TID PRN    guaiFENesin ER (MUCINEX) tablet 1,200 mg  1,200 mg Oral Q12H    aspirin delayed-release tablet 81 mg  81 mg Oral DAILY    levothyroxine (SYNTHROID) tablet 50 mcg  50 mcg Oral ACB    sodium chloride (NS) flush 5-40 mL  5-40 mL IntraVENous Q8H    sodium chloride (NS) flush 5-40 mL  5-40 mL IntraVENous PRN    methylPREDNISolone (PF) (Solu-MEDROL) injection 60 mg  60 mg IntraVENous Q6H    azithromycin (ZITHROMAX) 500 mg in 0.9% sodium chloride 250 mL IVPB  500 mg IntraVENous Q24H    acetaminophen (TYLENOL) tablet 650 mg  650 mg Oral Q4H PRN    enoxaparin (LOVENOX) injection 40 mg  40 mg SubCUTAneous Q24H    albuterol-ipratropium (DUO-NEB) 2.5 MG-0.5 MG/3 ML  3 mL Nebulization Q4H RT    arformoterol (BROVANA) neb solution 15 mcg  15 mcg Nebulization BID RT    budesonide (PULMICORT) 500 mcg/2 ml nebulizer suspension  500 mcg Nebulization BID RT           Kristen Mcmahan DO  Internal Medicine, Hospitalist  Pager: [facility]

## 2020-01-08 PROCEDURE — 65660000000 HC RM CCU STEPDOWN

## 2020-01-08 PROCEDURE — 74011000250 HC RX REV CODE- 250: Performed by: HOSPITALIST

## 2020-01-08 PROCEDURE — 94761 N-INVAS EAR/PLS OXIMETRY MLT: CPT

## 2020-01-08 PROCEDURE — 74011250637 HC RX REV CODE- 250/637: Performed by: HOSPITALIST

## 2020-01-08 PROCEDURE — 77010033678 HC OXYGEN DAILY

## 2020-01-08 PROCEDURE — 94640 AIRWAY INHALATION TREATMENT: CPT

## 2020-01-08 PROCEDURE — 74011250636 HC RX REV CODE- 250/636: Performed by: HOSPITALIST

## 2020-01-08 PROCEDURE — 94667 MNPJ CHEST WALL 1ST: CPT

## 2020-01-08 PROCEDURE — 74011250637 HC RX REV CODE- 250/637: Performed by: INTERNAL MEDICINE

## 2020-01-08 PROCEDURE — 94668 MNPJ CHEST WALL SBSQ: CPT

## 2020-01-08 RX ORDER — AZITHROMYCIN 250 MG/1
500 TABLET, FILM COATED ORAL DAILY
Status: DISCONTINUED | OUTPATIENT
Start: 2020-01-08 | End: 2020-01-10

## 2020-01-08 RX ADMIN — Medication 10 ML: at 17:27

## 2020-01-08 RX ADMIN — METHYLPREDNISOLONE SODIUM SUCCINATE 60 MG: 125 INJECTION, POWDER, FOR SOLUTION INTRAMUSCULAR; INTRAVENOUS at 17:27

## 2020-01-08 RX ADMIN — IPRATROPIUM BROMIDE AND ALBUTEROL SULFATE 3 ML: .5; 3 SOLUTION RESPIRATORY (INHALATION) at 11:35

## 2020-01-08 RX ADMIN — Medication 10 ML: at 22:39

## 2020-01-08 RX ADMIN — Medication 10 ML: at 06:04

## 2020-01-08 RX ADMIN — GUAIFENESIN AND CODEINE PHOSPHATE 10 ML: 100; 10 SOLUTION ORAL at 22:36

## 2020-01-08 RX ADMIN — IPRATROPIUM BROMIDE AND ALBUTEROL SULFATE 3 ML: .5; 3 SOLUTION RESPIRATORY (INHALATION) at 19:52

## 2020-01-08 RX ADMIN — METHYLPREDNISOLONE SODIUM SUCCINATE 60 MG: 125 INJECTION, POWDER, FOR SOLUTION INTRAMUSCULAR; INTRAVENOUS at 11:19

## 2020-01-08 RX ADMIN — LEVOTHYROXINE SODIUM 50 MCG: 0.03 TABLET ORAL at 07:02

## 2020-01-08 RX ADMIN — ARFORMOTEROL TARTRATE 15 MCG: 15 SOLUTION RESPIRATORY (INHALATION) at 20:05

## 2020-01-08 RX ADMIN — ENOXAPARIN SODIUM 40 MG: 40 INJECTION SUBCUTANEOUS at 21:13

## 2020-01-08 RX ADMIN — IPRATROPIUM BROMIDE AND ALBUTEROL SULFATE 3 ML: .5; 3 SOLUTION RESPIRATORY (INHALATION) at 15:09

## 2020-01-08 RX ADMIN — IPRATROPIUM BROMIDE AND ALBUTEROL SULFATE 3 ML: .5; 3 SOLUTION RESPIRATORY (INHALATION) at 00:50

## 2020-01-08 RX ADMIN — GUAIFENESIN 1200 MG: 600 TABLET, EXTENDED RELEASE ORAL at 08:04

## 2020-01-08 RX ADMIN — ASPIRIN 81 MG: 81 TABLET, COATED ORAL at 08:03

## 2020-01-08 RX ADMIN — BENZONATATE 100 MG: 100 CAPSULE ORAL at 11:27

## 2020-01-08 RX ADMIN — BUDESONIDE 500 MCG: 0.5 SUSPENSION RESPIRATORY (INHALATION) at 08:08

## 2020-01-08 RX ADMIN — IPRATROPIUM BROMIDE AND ALBUTEROL SULFATE 3 ML: .5; 3 SOLUTION RESPIRATORY (INHALATION) at 08:08

## 2020-01-08 RX ADMIN — GUAIFENESIN 1200 MG: 600 TABLET, EXTENDED RELEASE ORAL at 21:12

## 2020-01-08 RX ADMIN — METHYLPREDNISOLONE SODIUM SUCCINATE 60 MG: 125 INJECTION, POWDER, FOR SOLUTION INTRAMUSCULAR; INTRAVENOUS at 07:03

## 2020-01-08 RX ADMIN — Medication 10 ML: at 07:04

## 2020-01-08 RX ADMIN — AZITHROMYCIN MONOHYDRATE 500 MG: 250 TABLET ORAL at 21:12

## 2020-01-08 RX ADMIN — ARFORMOTEROL TARTRATE 15 MCG: 15 SOLUTION RESPIRATORY (INHALATION) at 08:08

## 2020-01-08 RX ADMIN — BUDESONIDE 500 MCG: 0.5 SUSPENSION RESPIRATORY (INHALATION) at 19:52

## 2020-01-08 NOTE — PROGRESS NOTES
Problem: Discharge Planning  Goal: *Discharge to safe environment  Outcome: Progressing Towards Goal    Home at this time    Review oxygen needs prior to d/c.

## 2020-01-08 NOTE — ROUTINE PROCESS
0810-Assessment completed, call bell within reach, no distress noted, voiced no complaints at this time. 1200-no change in condition, no distress noted. Voiced no complaints. Decreased O2 to 4L. Will continue to monitor. 1400-resting quietly in bed.  1600-no change in condition, no distress noted. Voiced no complaints. 1915-Bedside and Verbal shift change report given to Ceasar White (oncoming nurse) by Kelsi Cash (offgoing nurse). Report included the following information SBAR, MAR and Recent Results.

## 2020-01-08 NOTE — PROGRESS NOTES
Problem: Gas Exchange - Impaired  Goal: *Absence of hypoxia  Outcome: Progressing Towards Goal   Respiratory Therapy Assessment Care Plan    Patient:  Alecia Capone 76 y.o. female 1/8/2020 8:11 AM    Acute hypoxemic respiratory failure ([address] [J96.01]      Chest X-RAY:   Results from Hospital Encounter encounter on 01/06/20   XR CHEST PORT    Impression IMPRESSION:     1. Chronic lung disease with increasing biapical pleuroparenchymal scarring and  with a questionable subcentimeter nodule in the right upper lobe. Nonemergent  CT of the chest is suggested, as clinically warranted for further evaluation. 2.  No acute findings are suggested. Results from [place] encounter on 03/07/18   XR CHEST PORT    Impression Impression:    1. Patchy infiltrates at both lung bases.    Results from Hospital Encounter encounter on 08/19/16   XR ABD (AP AND ERECT OR DECUB)    Impression IMPRESSION:    Nonobstructive bowel gas pattern            Vital Signs:   Visit Vitals  /54   Pulse 82   Temp 98.5 °F (36.9 °C)   Resp 20   Wt 90.4 kg (199 lb 4.8 oz)   SpO2 90%   BMI 30.30 kg/m²         Indications for treatment: HIS COPD        Plan of care: Nebs           Goal: Improve WOB

## 2020-01-08 NOTE — PROGRESS NOTES
Internal Medicine Progress Note    Patient's Name: Priya Meter Date: 1/6/2020  Length of Stay: 2      Assessment/Plan     Active Hospital Problems    Diagnosis Date Noted    Acute hypoxemic respiratory failure ([address] 01/06/2020    Hypothyroid 01/06/2020    Acute bronchitis 03/08/2018    COPD with exacerbation (HCC) 03/07/2018     - Cont IV steroids  - Cont IV azithromycin  - Cont nebs around clock  - Cont O2, titrate to keep above 88%  - Cont tessalon and mucinex  - Add chest PT, acapella  - Will likely need O2 once acute issues over  - Cont acceptable home medications for chronic conditions   - DVT protocol    I have personally reviewed all pertinent labs and films that have officially resulted over the last 24 hours. I have personally checked for all pending labs that are awaiting final results. Subjective     Pt s/e @ bedside  No major events overnight  Cough a little better  Able to sleep last night  Still w/ SOB and on oxygen with levels going into 70s on RA  Denies CP    Objective     Visit Vitals  /49   Pulse 76   Temp 98 °F (36.7 °C)   Resp 20   Wt 90.4 kg (199 lb 4.8 oz)   SpO2 93%   BMI 30.30 kg/m²       Physical Exam:  General Appearance: NAD, conversant  Lungs: Decreased BS B/L, no wheeze  CV: RRR, no m/r/g  Abdomen: soft, non-tender, normal bowel sounds  Extremities: no cyanosis, no peripheral edema  Neuro: No focal deficits, motor/sensory intact    Lab/Data Reviewed:  BMP:   No results found for: NA, K, CL, CO2, AGAP, GLU, BUN, CREA, GFRAA, GFRNA  CBC:   No results found for: WBC, HGB, HGBEXT, HCT, HCTEXT, PLT, PLTEXT, HGBEXT, HCTEXT, PLTEXT    Imaging Reviewed:  No results found.     Medications Reviewed:  Current Facility-Administered Medications   Medication Dose Route Frequency    benzonatate (TESSALON) capsule 100 mg  100 mg Oral TID PRN    guaiFENesin ER (MUCINEX) tablet 1,200 mg  1,200 mg Oral Q12H    guaiFENesin-codeine (ROBITUSSIN AC) 100-10 mg/5 mL solution 10 mL  10 mL Oral Q6H PRN    aspirin delayed-release tablet 81 mg  81 mg Oral DAILY    levothyroxine (SYNTHROID) tablet 50 mcg  50 mcg Oral ACB    sodium chloride (NS) flush 5-40 mL  5-40 mL IntraVENous Q8H    sodium chloride (NS) flush 5-40 mL  5-40 mL IntraVENous PRN    methylPREDNISolone (PF) (Solu-MEDROL) injection 60 mg  60 mg IntraVENous Q6H    azithromycin (ZITHROMAX) 500 mg in 0.9% sodium chloride 250 mL IVPB  500 mg IntraVENous Q24H    acetaminophen (TYLENOL) tablet 650 mg  650 mg Oral Q4H PRN    enoxaparin (LOVENOX) injection 40 mg  40 mg SubCUTAneous Q24H    albuterol-ipratropium (DUO-NEB) 2.5 MG-0.5 MG/3 ML  3 mL Nebulization Q4H RT    arformoterol (BROVANA) neb solution 15 mcg  15 mcg Nebulization BID RT    budesonide (PULMICORT) 500 mcg/2 ml nebulizer suspension  500 mcg Nebulization BID RT           Rita Hurst DO  Internal Medicine, Hospitalist  Pager: [phone] [facility]

## 2020-01-08 NOTE — PROGRESS NOTES
Problem: Falls - Risk of  Goal: *Absence of Falls  Description  Document Amando Kumar Fall Risk and appropriate interventions in the flowsheet.   Outcome: Progressing Towards Goal  Note: Fall Risk Interventions:            Medication Interventions: Patient to call before getting OOB                   Problem: Patient Education: Go to Patient Education Activity  Goal: Patient/Family Education  Outcome: Progressing Towards Goal     Problem: Gas Exchange - Impaired  Goal: *Absence of hypoxia  Outcome: Progressing Towards Goal     Problem: Pain  Goal: *Control of Pain  Outcome: Progressing Towards Goal     Problem: Breathing Pattern - Ineffective  Goal: *Absence of hypoxia  Outcome: Progressing Towards Goal  Goal: *Use of effective breathing techniques  Outcome: Progressing Towards Goal  Goal: *PALLIATIVE CARE:  Alleviation of Dyspnea  Outcome: Progressing Towards Goal     Problem: Discharge Planning  Goal: *Discharge to safe environment  Outcome: Progressing Towards Goal     Problem: Pain  Goal: *Control of Pain  Outcome: Progressing Towards Goal     Problem: Gas Exchange - Impaired  Goal: *Absence of hypoxia  Outcome: Progressing Towards Goal

## 2020-01-08 NOTE — PROGRESS NOTES
conducted an initial consultation and Spiritual Assessment for David Torres, who is a 76 y.o.,female. Patient's Primary Language is: [place]. According to the patient's EMR Tenriism Affiliation is: [employer]ian. The reason the Patient came to the hospital is:   Patient Active Problem List    Diagnosis Date Noted    Acute hypoxemic respiratory failure ([facility] 01/06/2020    Hypothyroid 01/06/2020    Influenza 03/08/2018    Acute bronchitis 03/08/2018    COPD with exacerbation ([facility] 03/07/2018    Advance directive discussed with patient 05/04/2016    Nicotine dependence in remission 02/04/2016    Obesity, Class I, BMI 30-34.9 02/04/2016    Hashimoto's thyroiditis 12/28/2015    Hyperlipidemia with target LDL less than 70 07/13/2015        The  provided the following Interventions:  Initiated a relationship of care and support. Explored issues of micheal, belief, spirituality and Jewish/ritual needs while hospitalized. Listened empathically. Offered prayer and assurance of continued prayers on patient's behalf. The following outcomes where achieved:  Patient shared limited information about both their medical narrative and spiritual journey/beliefs.  confirmed Patient's Tenriism Affiliation. Patient processed feeling about current hospitalization. Patient expressed gratitude for 's visit. Assessment:  Patient does not have any Jewish/cultural needs that will affect patient's preferences in health care. There are no spiritual or Jewish issues which require intervention at this time. Plan:  Chaplains will continue to follow and will provide pastoral care on an as needed/requested basis.  recommends bedside caregivers page  on duty if patient shows signs of acute spiritual or emotional distress.  Fr.  [address]   [phone]

## 2020-01-08 NOTE — PROGRESS NOTES
Problem: Falls - Risk of  Goal: *Absence of Falls  Description  Document Bishop Hahn Fall Risk and appropriate interventions in the flowsheet.   Outcome: Progressing Towards Goal  Note: Fall Risk Interventions:            Medication Interventions: Patient to call before getting OOB                   Problem: Gas Exchange - Impaired  Goal: *Absence of hypoxia  Outcome: Progressing Towards Goal     Problem: Breathing Pattern - Ineffective  Goal: *Absence of hypoxia  Outcome: Progressing Towards Goal

## 2020-01-08 NOTE — PROGRESS NOTES
Assume care of patient lying in bed. Alert and oriented X 4. Bed locked in lowest position. Denies pain or discomfort at present. Call light within reach and understand to use for assistance and needs. 2245 Call placed to Dr. Jacoby Reaves about patient dry nonproductive continuous coughing. Unable to rest and discomfort in chest area and head with coughing as stated for last 3 days. 2300 Robitussin 10 ml administered for nonproductive dry  continuous cough. 01/08/2019    0000 Patient coughing occasionally and states her cough is so much better. Remains unable to sleep at present. 0200 Patient asleep without coughing noted. 0400 Remains asleep with vital signs taken and minor coughing noted, returns to sleep.    0600 Sleeping without any complaints of coughing. 0800 Bedside and Verbal shift change report given to JESSICA Mace (oncoming nurse) by Johann Resendiz RN (offgoing nurse). Report given with SBAR, Kardex, Intake/Output, MAR and Recent Results.

## 2020-01-09 PROCEDURE — 74011250636 HC RX REV CODE- 250/636: Performed by: HOSPITALIST

## 2020-01-09 PROCEDURE — 77010033678 HC OXYGEN DAILY

## 2020-01-09 PROCEDURE — 94668 MNPJ CHEST WALL SBSQ: CPT

## 2020-01-09 PROCEDURE — 65660000000 HC RM CCU STEPDOWN

## 2020-01-09 PROCEDURE — 74011250637 HC RX REV CODE- 250/637: Performed by: HOSPITALIST

## 2020-01-09 PROCEDURE — 94761 N-INVAS EAR/PLS OXIMETRY MLT: CPT

## 2020-01-09 PROCEDURE — 74011000250 HC RX REV CODE- 250: Performed by: HOSPITALIST

## 2020-01-09 PROCEDURE — 94640 AIRWAY INHALATION TREATMENT: CPT

## 2020-01-09 RX ORDER — PREDNISONE 50 MG/1
50 TABLET ORAL
Status: DISCONTINUED | OUTPATIENT
Start: 2020-01-10 | End: 2020-01-10

## 2020-01-09 RX ADMIN — BENZONATATE 100 MG: 100 CAPSULE ORAL at 08:44

## 2020-01-09 RX ADMIN — BUDESONIDE 500 MCG: 0.5 SUSPENSION RESPIRATORY (INHALATION) at 08:37

## 2020-01-09 RX ADMIN — IPRATROPIUM BROMIDE AND ALBUTEROL SULFATE 3 ML: .5; 3 SOLUTION RESPIRATORY (INHALATION) at 15:24

## 2020-01-09 RX ADMIN — IPRATROPIUM BROMIDE AND ALBUTEROL SULFATE 3 ML: .5; 3 SOLUTION RESPIRATORY (INHALATION) at 03:25

## 2020-01-09 RX ADMIN — Medication 10 ML: at 06:08

## 2020-01-09 RX ADMIN — Medication 10 ML: at 16:08

## 2020-01-09 RX ADMIN — Medication 10 ML: at 21:53

## 2020-01-09 RX ADMIN — BUDESONIDE 500 MCG: 0.5 SUSPENSION RESPIRATORY (INHALATION) at 21:11

## 2020-01-09 RX ADMIN — ASPIRIN 81 MG: 81 TABLET, COATED ORAL at 08:41

## 2020-01-09 RX ADMIN — IPRATROPIUM BROMIDE AND ALBUTEROL SULFATE 3 ML: .5; 3 SOLUTION RESPIRATORY (INHALATION) at 21:11

## 2020-01-09 RX ADMIN — IPRATROPIUM BROMIDE AND ALBUTEROL SULFATE 3 ML: .5; 3 SOLUTION RESPIRATORY (INHALATION) at 11:33

## 2020-01-09 RX ADMIN — METHYLPREDNISOLONE SODIUM SUCCINATE 60 MG: 125 INJECTION, POWDER, FOR SOLUTION INTRAMUSCULAR; INTRAVENOUS at 06:07

## 2020-01-09 RX ADMIN — ENOXAPARIN SODIUM 40 MG: 40 INJECTION SUBCUTANEOUS at 21:49

## 2020-01-09 RX ADMIN — ARFORMOTEROL TARTRATE 15 MCG: 15 SOLUTION RESPIRATORY (INHALATION) at 21:24

## 2020-01-09 RX ADMIN — GUAIFENESIN 1200 MG: 600 TABLET, EXTENDED RELEASE ORAL at 08:41

## 2020-01-09 RX ADMIN — ARFORMOTEROL TARTRATE 15 MCG: 15 SOLUTION RESPIRATORY (INHALATION) at 08:37

## 2020-01-09 RX ADMIN — IPRATROPIUM BROMIDE AND ALBUTEROL SULFATE 3 ML: .5; 3 SOLUTION RESPIRATORY (INHALATION) at 08:37

## 2020-01-09 RX ADMIN — AZITHROMYCIN MONOHYDRATE 500 MG: 250 TABLET ORAL at 21:49

## 2020-01-09 RX ADMIN — LEVOTHYROXINE SODIUM 50 MCG: 0.03 TABLET ORAL at 06:07

## 2020-01-09 RX ADMIN — GUAIFENESIN 1200 MG: 600 TABLET, EXTENDED RELEASE ORAL at 21:49

## 2020-01-09 RX ADMIN — IPRATROPIUM BROMIDE AND ALBUTEROL SULFATE 3 ML: .5; 3 SOLUTION RESPIRATORY (INHALATION) at 00:00

## 2020-01-09 RX ADMIN — METHYLPREDNISOLONE SODIUM SUCCINATE 60 MG: 125 INJECTION, POWDER, FOR SOLUTION INTRAMUSCULAR; INTRAVENOUS at 00:52

## 2020-01-09 RX ADMIN — METHYLPREDNISOLONE SODIUM SUCCINATE 60 MG: 125 INJECTION, POWDER, FOR SOLUTION INTRAMUSCULAR; INTRAVENOUS at 21:49

## 2020-01-09 NOTE — PROGRESS NOTES
Internal Medicine Progress Note    Patient's Name: Tyree Raman Date: 1/6/2020  Length of Stay: 3      Assessment/Plan     Active Hospital Problems    Diagnosis Date Noted    Acute hypoxemic respiratory failure ([address] 01/06/2020    Hypothyroid 01/06/2020    Acute bronchitis 03/08/2018    COPD with exacerbation (HCC) 03/07/2018     - Cont IV steroids, change to q12h w/ final dose tonight  - Start PO pred isrrael  - Cont azithromycin (final dose isrrael)  - Cont nebs  - Cont O2, titrate to keep above 88%  - Cont tessalon and mucinex  - Cont chest PT, acapella  - Cont daily walk tests  - Will be out of acute exacerbation by isrrael  - Cont acceptable home medications for chronic conditions   - DVT protocol    I have personally reviewed all pertinent labs and films that have officially resulted over the last 24 hours. I have personally checked for all pending labs that are awaiting final results. Subjective     Pt s/e @ bedside  No major events overnight  Cont to feel better  Still on significant amt of O2  Clearing things up much better  Minimal SOB  Denies CP    Objective     Visit Vitals  /60   Pulse 75   Temp 97.9 °F (36.6 °C)   Resp 20   Wt 90.4 kg (199 lb 4.8 oz)   SpO2 93%   BMI 30.30 kg/m²       Physical Exam:  General Appearance: NAD, conversant  Lungs: Decreased BS B/L, no wheeze  CV: RRR, no m/r/g  Abdomen: soft, non-tender, normal bowel sounds  Extremities: no cyanosis, no peripheral edema  Neuro: No focal deficits, motor/sensory intact    Lab/Data Reviewed:  BMP:   No results found for: NA, K, CL, CO2, AGAP, GLU, BUN, CREA, GFRAA, GFRNA  CBC:   No results found for: WBC, HGB, HGBEXT, HCT, HCTEXT, PLT, PLTEXT, HGBEXT, HCTEXT, PLTEXT    Imaging Reviewed:  No results found.     Medications Reviewed:  Current Facility-Administered Medications   Medication Dose Route Frequency    azithromycin (ZITHROMAX) tablet 500 mg  500 mg Oral DAILY    benzonatate (TESSALON) capsule 100 mg  100 mg Oral TID PRN    guaiFENesin ER (MUCINEX) tablet 1,200 mg  1,200 mg Oral Q12H    guaiFENesin-codeine (ROBITUSSIN AC) 100-10 mg/5 mL solution 10 mL  10 mL Oral Q6H PRN    aspirin delayed-release tablet 81 mg  81 mg Oral DAILY    levothyroxine (SYNTHROID) tablet 50 mcg  50 mcg Oral ACB    sodium chloride (NS) flush 5-40 mL  5-40 mL IntraVENous Q8H    sodium chloride (NS) flush 5-40 mL  5-40 mL IntraVENous PRN    methylPREDNISolone (PF) (Solu-MEDROL) injection 60 mg  60 mg IntraVENous Q6H    acetaminophen (TYLENOL) tablet 650 mg  650 mg Oral Q4H PRN    enoxaparin (LOVENOX) injection 40 mg  40 mg SubCUTAneous Q24H    albuterol-ipratropium (DUO-NEB) 2.5 MG-0.5 MG/3 ML  3 mL Nebulization Q4H RT    arformoterol (BROVANA) neb solution 15 mcg  15 mcg Nebulization BID RT    budesonide (PULMICORT) 500 mcg/2 ml nebulizer suspension  500 mcg Nebulization BID RT           Deidre Matthew DO  Internal Medicine, Hospitalist  Pager: [phone] [facility]

## 2020-01-09 NOTE — PROGRESS NOTES
Assume care of patient lying in bed with HOB in upright angle and ISP in use while watching TV. Alert and oriented X 4. Denies pain or discomfort at present. Patient's coughing subsided tremendously and noted she able to communicate without coughing through entire conversation. Bed locked in lowest poisition. Call light within reach and understand to use for assistance and needs. 01/09/2020  0710 Bedside and Verbal shift change report given to JESSICA Mace (oncoming nurse) by Leonides Crabtree RN (offgoing nurse). Report given with SBAR, Kardex, Intake/Output, MAR and Recent Results.

## 2020-01-09 NOTE — ROUTINE PROCESS
0842-Assessment completed, call bell within reach, no distress noted. 1030-attempted to do walk test. O2 sats on room was 74%. Reapplied Oxygen at 4 L, O2 sats up to 91%. 1230-no change in condition, no distress noted, voiced no complaints at this time. 1400-resting quietly in bed. 1630-no change in condition, no distress noted, voiced no complaints at this time. 1925-Bedside and Verbal shift change report given to Jair Jo (oncoming nurse) by Albin Monaco (offgoing nurse). Report included the following information SBAR, MAR and Recent Results.

## 2020-01-09 NOTE — PROGRESS NOTES
Problem: Gas Exchange - Impaired  Goal: *Absence of hypoxia  Outcome: Progressing Towards Goal   Respiratory Therapy Assessment Care Plan    Patient:  Bam Living 76 y.o. female 1/9/2020 8:41 AM    Acute hypoxemic respiratory failure ([address] [J96.01]      Chest X-RAY:   Results from Hospital Encounter encounter on 01/06/20   XR CHEST PORT    Impression IMPRESSION:     1. Chronic lung disease with increasing biapical pleuroparenchymal scarring and  with a questionable subcentimeter nodule in the right upper lobe. Nonemergent  CT of the chest is suggested, as clinically warranted for further evaluation. 2.  No acute findings are suggested. Results from [place] encounter on 03/07/18   XR CHEST PORT    Impression Impression:    1. Patchy infiltrates at both lung bases.    Results from Hospital Encounter encounter on 08/19/16   XR ABD (AP AND ERECT OR DECUB)    Impression IMPRESSION:    Nonobstructive bowel gas pattern            Vital Signs:   Visit Vitals  /60   Pulse 75   Temp 97.9 °F (36.6 °C)   Resp 20   Wt 90.4 kg (199 lb 4.8 oz)   SpO2 93%   BMI 30.30 kg/m²         Indications for treatment: HIS COPD        Plan of care: Nebs           Goal: Improve WOB

## 2020-01-10 VITALS
HEART RATE: 68 BPM | WEIGHT: 199.3 LBS | RESPIRATION RATE: 20 BRPM | TEMPERATURE: 97.6 F | DIASTOLIC BLOOD PRESSURE: 54 MMHG | BODY MASS INDEX: 30.3 KG/M2 | SYSTOLIC BLOOD PRESSURE: 129 MMHG | OXYGEN SATURATION: 92 %

## 2020-01-10 PROCEDURE — 94640 AIRWAY INHALATION TREATMENT: CPT

## 2020-01-10 PROCEDURE — 74011000250 HC RX REV CODE- 250: Performed by: HOSPITALIST

## 2020-01-10 PROCEDURE — 74011250637 HC RX REV CODE- 250/637: Performed by: HOSPITALIST

## 2020-01-10 PROCEDURE — 77010033678 HC OXYGEN DAILY

## 2020-01-10 PROCEDURE — 74011636637 HC RX REV CODE- 636/637: Performed by: HOSPITALIST

## 2020-01-10 PROCEDURE — 94761 N-INVAS EAR/PLS OXIMETRY MLT: CPT

## 2020-01-10 RX ORDER — CODEINE PHOSPHATE AND GUAIFENESIN 10; 100 MG/5ML; MG/5ML
10 SOLUTION ORAL
Qty: 120 ML | Refills: 0 | Status: SHIPPED | OUTPATIENT
Start: 2020-01-10 | End: 2020-01-13

## 2020-01-10 RX ADMIN — PREDNISONE 50 MG: 50 TABLET ORAL at 08:41

## 2020-01-10 RX ADMIN — Medication 10 ML: at 16:21

## 2020-01-10 RX ADMIN — BUDESONIDE 500 MCG: 0.5 SUSPENSION RESPIRATORY (INHALATION) at 07:55

## 2020-01-10 RX ADMIN — ASPIRIN 81 MG: 81 TABLET, COATED ORAL at 08:41

## 2020-01-10 RX ADMIN — GUAIFENESIN 1200 MG: 600 TABLET, EXTENDED RELEASE ORAL at 08:41

## 2020-01-10 RX ADMIN — IPRATROPIUM BROMIDE AND ALBUTEROL SULFATE 3 ML: .5; 3 SOLUTION RESPIRATORY (INHALATION) at 04:47

## 2020-01-10 RX ADMIN — IPRATROPIUM BROMIDE AND ALBUTEROL SULFATE 3 ML: .5; 3 SOLUTION RESPIRATORY (INHALATION) at 00:55

## 2020-01-10 RX ADMIN — BENZONATATE 100 MG: 100 CAPSULE ORAL at 08:41

## 2020-01-10 RX ADMIN — LEVOTHYROXINE SODIUM 50 MCG: 0.03 TABLET ORAL at 05:49

## 2020-01-10 RX ADMIN — IPRATROPIUM BROMIDE AND ALBUTEROL SULFATE 3 ML: .5; 3 SOLUTION RESPIRATORY (INHALATION) at 11:06

## 2020-01-10 RX ADMIN — Medication 10 ML: at 05:49

## 2020-01-10 RX ADMIN — IPRATROPIUM BROMIDE AND ALBUTEROL SULFATE 3 ML: .5; 3 SOLUTION RESPIRATORY (INHALATION) at 07:55

## 2020-01-10 RX ADMIN — IPRATROPIUM BROMIDE AND ALBUTEROL SULFATE 3 ML: .5; 3 SOLUTION RESPIRATORY (INHALATION) at 16:57

## 2020-01-10 RX ADMIN — ARFORMOTEROL TARTRATE 15 MCG: 15 SOLUTION RESPIRATORY (INHALATION) at 07:55

## 2020-01-10 NOTE — PROGRESS NOTES
Received patient from [facility]. Pt awake and in bed. Denies pain. Bed locked in lowest position. Frequently used items and call light within reach. 5664: Uneventful night for pt. Pt rested and was treated per eMAR. No further complaints and no distress noted. Bedside shift change report given to Slim (oncoming nurse) by Lily Stuart RN (offgoing nurse). Report included the following information SBAR, Intake/Output, MAR and Quality Measures. Opportunity for questions and clarification provided.

## 2020-01-10 NOTE — PROGRESS NOTES
Problem: Discharge Planning  Goal: *Discharge to safe environment  Outcome: Progressing Towards Goal     Home     Oxygen order faxed and submitted to [facility].    Spoke with [address] will review. Per [employer] they will not take patient's insurance. Have  Submitted to [address] for oxygen information and order uploaded  In [address]. Spoke with Pratibha Beltrán at [address]  They take patient's insurance and will review and let me know. Pratibha Beltrán with [address] here to assist with oxygen approval. Has tank to give to patient once approved. Patient has been approved for oxygen and [employer] here to deliver to patient. Care Management Interventions  PCP Verified by CM: Yes  Palliative Care Criteria Met (RRAT>21 & CHF Dx)?: No  Transition of Care Consult (CM Consult):  Other(home)  Discharge Location  Discharge Placement: Home

## 2020-01-10 NOTE — DISCHARGE INSTRUCTIONS
DISCHARGE SUMMARY from Nurse    PATIENT INSTRUCTIONS:    After general anesthesia or intravenous sedation, for 24 hours or while taking prescription Narcotics:  · Limit your activities  · Do not drive and operate hazardous machinery  · Do not make important personal or business decisions  · Do  not drink alcoholic beverages  · If you have not urinated within 8 hours after discharge, please contact your surgeon on call. Report the following to your surgeon:  · Excessive pain, swelling, redness or odor of or around the surgical area  · Temperature over 100.5  · Nausea and vomiting lasting longer than 4 hours or if unable to take medications  · Any signs of decreased circulation or nerve impairment to extremity: change in color, persistent  numbness, tingling, coldness or increase pain  · Any questions    What to do at Home:  Recommended activity: Activity as tolerated    If you experience any of the following symptoms fever, chills, or worsening shortness of breath, please follow up with primary care physician/emergency room. *  Please give a list of your current medications to your Primary Care Provider. *  Please update this list whenever your medications are discontinued, doses are      changed, or new medications (including over-the-counter products) are added. *  Please carry medication information at all times in case of emergency situations. These are general instructions for a healthy lifestyle:    No smoking/ No tobacco products/ Avoid exposure to second hand smoke  Surgeon General's Warning:  Quitting smoking now greatly reduces serious risk to your health.     Obesity, smoking, and sedentary lifestyle greatly increases your risk for illness    A healthy diet, regular physical exercise & weight monitoring are important for maintaining a healthy lifestyle    You may be retaining fluid if you have a history of heart failure or if you experience any of the following symptoms:  Weight gain of 3 pounds or more overnight or 5 pounds in a week, increased swelling in our hands or feet or shortness of breath while lying flat in bed. Please call your doctor as soon as you notice any of these symptoms; do not wait until your next office visit. The discharge information has been reviewed with the patient. The patient verbalized understanding. Discharge medications reviewed with the patient and appropriate educational materials and side effects teaching were provided. Patient armband removed and shredded.

## 2020-01-10 NOTE — PROGRESS NOTES
Patient was in chronic stable state during her walk test today.     Freda Stafford, DO  Internal Medicine, Hospitalist  Pager: [phone] [facility]

## 2020-01-10 NOTE — PROGRESS NOTES
Patient is no longer in acute exacerbation of COPD    Alexia Barrett, DO  Internal Medicine, Hospitalist  Pager: [phone]

## 2020-01-10 NOTE — PROGRESS NOTES
Problem: Gas Exchange - Impaired  Goal: *Absence of hypoxia  Outcome: Progressing Towards Goal     Problem: Pain  Goal: *Control of Pain  Outcome: Progressing Towards Goal     Problem: Breathing Pattern - Ineffective  Goal: *Absence of hypoxia  Outcome: Progressing Towards Goal

## 2020-01-10 NOTE — PROGRESS NOTES
Problem: Falls - Risk of  Goal: *Absence of Falls  Description  Document Rocio Macdonald Fall Risk and appropriate interventions in the flowsheet.   Note: Fall Risk Interventions:            Medication Interventions: Evaluate medications/consider consulting pharmacy, Patient to call before getting OOB, Teach patient to arise slowly                   Problem: Patient Education: Go to Patient Education Activity  Goal: Patient/Family Education  Outcome: Progressing Towards Goal     Problem: Gas Exchange - Impaired  Goal: *Absence of hypoxia  Outcome: Progressing Towards Goal     Problem: Breathing Pattern - Ineffective  Goal: *Absence of hypoxia  Outcome: Progressing Towards Goal  Goal: *Use of effective breathing techniques  Outcome: Progressing Towards Goal  Goal: *PALLIATIVE CARE:  Alleviation of Dyspnea  Outcome: Progressing Towards Goal     Problem: Discharge Planning  Goal: *Discharge to safe environment  Outcome: Progressing Towards Goal

## 2020-01-10 NOTE — ROUTINE PROCESS
Walk test                Oxygen sats on room air at rest was 86-88%                Applied oxygen on at 4 Liters and sats went up to 92%.

## 2020-01-10 NOTE — ROUTINE PROCESS
0823-Assessment completed, call bell within reach, no distress noted. 1230-no change in condition, no distress noted. 1630-no change in condition, no distress noted. 1900-discharge instructions given, verbalized understanding. Discharged home via wheel chair.

## 2020-01-10 NOTE — PROGRESS NOTES
Respiratory Therapy Assessment Care Plan    Patient:  Dariana Morales 76 y.o. female 1/10/2020 2:48 PM    Acute hypoxemic respiratory failure ([address] [J96.01]      Chest X-RAY:   Results from Hospital Encounter encounter on 01/06/20   XR CHEST PORT    Impression IMPRESSION:     1. Chronic lung disease with increasing biapical pleuroparenchymal scarring and  with a questionable subcentimeter nodule in the right upper lobe. Nonemergent  CT of the chest is suggested, as clinically warranted for further evaluation. 2.  No acute findings are suggested. Results from [place] encounter on 03/07/18   XR CHEST PORT    Impression Impression:    1. Patchy infiltrates at both lung bases.    Results from Hospital Encounter encounter on 08/19/16   XR ABD (AP AND ERECT OR DECUB)    Impression IMPRESSION:    Nonobstructive bowel gas pattern            Vital Signs:   Visit Vitals  /69 (BP 1 Location: Right arm, BP Patient Position: At rest)   Pulse 69   Temp 97.8 °F (36.6 °C)   Resp 18   Wt 90.4 kg (199 lb 4.8 oz)   SpO2 91%   BMI 30.30 kg/m²         Indications for treatment: COPD exacerbation      Plan of care:Duoneb Q4H, Pulmicort,Brovana BID        Goal: Improve gas exchange

## 2020-01-10 NOTE — DISCHARGE SUMMARY
Internal Medicine Discharge Summary        Patient: Inocencio Sanz    YOB: 1945    Age:  76 y.o. Admit Date: 1/6/2020    Discharge Date: 1/10/2020    LOS:  LOS: 4 days     Discharge To: Home    Consults: None    Admission Diagnoses: Acute hypoxemic respiratory failure ([facility] [J96.01]    Discharge Diagnoses:    Problem List as of 1/10/2020 Date Reviewed: 1/6/2020          Codes Class Noted - Resolved    * (Principal) Acute hypoxemic respiratory failure ([facility] ICD-10-CM: J96.01  ICD-9-CM: 518.81  1/6/2020 - Present        Hypothyroid ICD-10-CM: E03.9  ICD-9-CM: 244.9  1/6/2020 - Present        Influenza ICD-10-CM: J11.1  ICD-9-CM: 487.1  3/8/2018 - Present        Acute bronchitis ICD-10-CM: J20.9  ICD-9-CM: 466.0  3/8/2018 - Present        COPD with exacerbation ([facility] ICD-10-CM: J44.1  ICD-9-CM: 491.21  3/7/2018 - Present        Advance directive discussed with patient ICD-10-CM: Z71.89  ICD-9-CM: V65.49  5/4/2016 - Present    Overview Addendum 5/4/2016 11:11 AM by Issac Mariano MD     Pt would like to appoint her son, Kevin Chand as her medical decision maker in the event that she can no longer do so. Phone number is [phone]. Her secondary person [email or web address] Phone number is [phone]. If her death is imminent and medical treatment will not help her recover, pt does not want life prolonging measures. If her condition makes her unaware of herself or her surroundings and she cannot interact with others, pt does not want life prolonging measures. Advance directive scanned in the chart under media.                Nicotine dependence in remission ICD-10-CM: F17.201  ICD-9-CM: V15.82  2/4/2016 - Present        Obesity, Class I, BMI 30-34.9 ICD-10-CM: E66.9  ICD-9-CM: 278.00  2/4/2016 - Present        Hashimoto's thyroiditis ICD-10-CM: E06.3  ICD-9-CM: 245.2  12/28/2015 - Present        Hyperlipidemia with target LDL less than 70 ICD-10-CM: E78.5  ICD-9-CM: 272.4  7/13/2015 - Present        RESOLVED: Simple chronic bronchitis ([address] ICD-10-CM: J41.0  ICD-9-CM: 491.0  5/4/2016 - 6/27/2017              Discharge Condition:  Improved    Procedures: none         HPI: Yury Romero is a 76 y.o. female with a PMHx of COPD, hypothyroidism who presented to the ED from PCP with hypoxia and SOB. Patient states new onset cough about 5 days ago with productive yellow sputum. Then, about 2 days ago, she developed SOB. She uses inhalers at home but nothing improved. She went to her PCP's office for sick visit and was sent to ED due to O2 levels in the 80s. She denies and fever or chills. She denied any associated chest pain. In the ED, she was found to be persistently hypoxic despite breathing treatments. Her oxygen improved with 3-5L O2. CXR was negative for PNA but showed evidence of emphysema. Hospital Course:    Acute hypoxic respiratory failure 2/2 COPD exacerbation 2/2 acute bacterial bronchitis - Treated with IV steroids for 5 days. Treated with azithromycin for 5 days. Was on continuous duonebs and brovana/pulmicort. Her O2 levels improved during admission but after acute process was over she was still hypoxic on room air which qualified her for home O2. She improved daily and was not complaining of significiant SOB on the day of discharge. She was treated with robitussin/codeine and given a Rx at discharge for her cough at night. The rest of the patient's chronic conditions were managed appropriately during their admission. They were medically stable at the time of discharge.     Visit Vitals  /54 (BP 1 Location: Left arm, BP Patient Position: At rest)   Pulse 68   Temp 97.6 °F (36.4 °C)   Resp 20   Wt 90.4 kg (199 lb 4.8 oz)   SpO2 91%   BMI 30.30 kg/m²       Physical Exam at Discharge:  General Appearance: NAD, conversant  HENT: normocephalic/atraumatic, moist mucus membranes  Lungs: Decreased BS B/L with normal respiratory effort  CV: RRR, no m/r/g  Abdomen: soft, non-tender, normal bowel sounds  Extremities: no cyanosis, no peripheral edema  Neuro: moves all extremities, no focal deficits  Psych: appropriate affect, alert and oriented to person, place and time    Labs Prior to Discharge:  Labs: Results:       Chemistry No results for input(s): GLU, NA, K, CL, CO2, BUN, CREA, CA, AGAP, BUCR, TBIL, GPT, AP, TP, ALB, GLOB, AGRAT in the last 72 hours. CBC w/Diff No results for input(s): WBC, RBC, HGB, HCT, PLT, GRANS, LYMPH, EOS, HGBEXT, HCTEXT, PLTEXT in the last 72 hours. Cardiac Enzymes No results for input(s): CPK, CKND1, YRN in the last 72 hours. No lab exists for component: CKRMB, TROIP   Coagulation No results for input(s): PTP, INR, APTT, INREXT in the last 72 hours. Lipid Panel Lab Results   Component Value Date/Time    Cholesterol, total 213 (H) 07/02/2018 11:07 AM    HDL Cholesterol 58 07/02/2018 11:07 AM    LDL, calculated 139 (H) 07/02/2018 11:07 AM    VLDL, calculated 16 07/02/2018 11:07 AM    Triglyceride 80 07/02/2018 11:07 AM    CHOL/HDL Ratio 3.7 07/02/2018 11:07 AM      BNP No results for input(s): BNPP in the last 72 hours. Liver Enzymes No results for input(s): TP, ALB, TBIL, AP, SGOT, GPT in the last 72 hours. No lab exists for component: DBIL   Thyroid Studies Lab Results   Component Value Date/Time    TSH 3.70 01/02/2019 09:50 AM            Significant Imaging:  Xr Chest Port    Result Date: 1/6/2020  EXAM: Portable chest radiograph 1/6/2020 CLINICAL INDICATION/HISTORY: COPD exacerbation. Cough for one week. TECHNIQUE: A semierect portable radiograph was obtained. COMPARISON: Portable x-ray from 3/7/2018. FINDINGS: The cardiac mediastinal contours are unchanged. Dense vascular calcifications are again noted in the aortic knob. Changes in the vascular pattern are noted, consistent with underlying emphysema.   Biapical pleuroparenchymal scarring is suggested but was not present on the prior exam. There is also the suggestion of a possible subcentimeter nodule in the apical segment of the right upper lobe which is partially obscured by an overlying EKG leads and could, therefore, the artifactual.  Patchy atelectasis and/or fibrosis are noted at the lung bases. The lungs are otherwise clear. There is no pneumothorax or pleural effusion. IMPRESSION:  1. Chronic lung disease with increasing biapical pleuroparenchymal scarring and with a questionable subcentimeter nodule in the right upper lobe. Nonemergent CT of the chest is suggested, as clinically warranted for further evaluation. 2.  No acute findings are suggested. Discharge Medications:     Current Discharge Medication List      START taking these medications    Details   guaiFENesin-codeine (ROBITUSSIN AC) 100-10 mg/5 mL solution Take 10 mL by mouth every six (6) hours as needed for Cough for up to 3 days. Max Daily Amount: 40 mL. Qty: 120 mL, Refills: 0    Associated Diagnoses: COPD with exacerbation ([address]         CONTINUE these medications which have NOT CHANGED    Details   ipratropium (ATROVENT HFA) 17 mcg/actuation inhaler 2 puffs every 8 hours. Qty: 3 Inhaler, Refills: 4    Associated Diagnoses: Simple chronic bronchitis (HCC)      budesonide-formoterol (SYMBICORT) 80-4.5 mcg/actuation HFAA Take 2 Puffs by inhalation two (2) times a day. Qty: 3 Inhaler, Refills: 4    Associated Diagnoses: COPD with exacerbation (HCC)      levothyroxine (SYNTHROID) 50 mcg tablet Take 1 Tab by mouth Daily (before breakfast). Qty: 90 Tab, Refills: 3    Associated Diagnoses: Hashimoto's thyroiditis      omega-3 fatty acids-vitamin e (FISH OIL) 1,000 mg cap Take 1 capsule by mouth.      calcium-cholecalciferol, D3, (CALTRATE 600+D) tablet Take 1 tablet by mouth daily. aspirin delayed-release 81 mg tablet Take  by mouth daily.              Activity: Activity as tolerated    Diet: Resume previous diet    Wound Care: None needed    Follow-up: Please follow up with your PCP within 7 days to discuss your recent hospitalization. Patient to arrange.          Total time spent including time spent on final examination and discharge discussion, discharge documentation and records reviewed and medication reconciliation: > 30 minutes    Sherly Trujillo DO  Internal Medicine, Hospitalist  Pager: [phone]

## 2020-01-13 ENCOUNTER — PATIENT OUTREACH (OUTPATIENT)
Dept: FAMILY MEDICINE CLINIC | Age: 75
End: 2020-01-13

## 2020-01-13 NOTE — Clinical Note
Dr. Selwyn Cai note for your review. Please see challenges reviewed with provider.  Thank you, [facility] Transitions Nurse [phone]

## 2020-01-21 ENCOUNTER — HOSPITAL ENCOUNTER (OUTPATIENT)
Dept: LAB | Age: 75
Discharge: HOME | End: 2020-01-21
Payer: MEDICARE

## 2020-01-21 ENCOUNTER — PATIENT OUTREACH (OUTPATIENT)
Dept: FAMILY MEDICINE CLINIC | Facility: CLINIC | Age: 75
End: 2020-01-21

## 2020-01-21 ENCOUNTER — OFFICE VISIT (OUTPATIENT)
Dept: FAMILY MEDICINE CLINIC | Age: 75
End: 2020-01-21

## 2020-01-21 VITALS
WEIGHT: 201 LBS | HEIGHT: 68 IN | OXYGEN SATURATION: 95 % | RESPIRATION RATE: 20 BRPM | SYSTOLIC BLOOD PRESSURE: 127 MMHG | TEMPERATURE: 98 F | DIASTOLIC BLOOD PRESSURE: 61 MMHG | BODY MASS INDEX: 30.46 KG/M2 | HEART RATE: 77 BPM

## 2020-01-21 DIAGNOSIS — E06.3 HASHIMOTO'S THYROIDITIS: ICD-10-CM

## 2020-01-21 DIAGNOSIS — J44.1 COPD WITH EXACERBATION (HCC): Primary | ICD-10-CM

## 2020-01-21 DIAGNOSIS — R93.89 ABNORMAL FINDING ON CHEST XRAY: ICD-10-CM

## 2020-01-21 DIAGNOSIS — Z13.220 SCREENING CHOLESTEROL LEVEL: ICD-10-CM

## 2020-01-21 DIAGNOSIS — N28.9 IMPAIRED RENAL FUNCTION: ICD-10-CM

## 2020-01-21 LAB
ALBUMIN SERPL-MCNC: 2.8 G/DL (ref 3.4–5)
ALBUMIN/GLOB SERPL: 0.7 {RATIO} (ref 0.8–1.7)
ALP SERPL-CCNC: 68 U/L (ref 45–117)
ALT SERPL-CCNC: 17 U/L (ref 13–56)
ANION GAP SERPL CALC-SCNC: 6 MMOL/L (ref 3–18)
APPEARANCE UR: CLEAR
AST SERPL-CCNC: 13 U/L (ref 10–38)
BILIRUB SERPL-MCNC: 0.5 MG/DL (ref 0.2–1)
BILIRUB UR QL: NEGATIVE
BUN SERPL-MCNC: 13 MG/DL (ref 7–18)
BUN/CREAT SERPL: 14 (ref 12–20)
CALCIUM SERPL-MCNC: 8.3 MG/DL (ref 8.5–10.1)
CHLORIDE SERPL-SCNC: 101 MMOL/L (ref 100–111)
CHOLEST SERPL-MCNC: 199 MG/DL
CO2 SERPL-SCNC: 29 MMOL/L (ref 21–32)
COLOR UR: YELLOW
CREAT SERPL-MCNC: 0.93 MG/DL (ref 0.6–1.3)
GLOBULIN SER CALC-MCNC: 4.1 G/DL (ref 2–4)
GLUCOSE SERPL-MCNC: 103 MG/DL (ref 74–99)
GLUCOSE UR STRIP.AUTO-MCNC: NEGATIVE MG/DL
HDLC SERPL-MCNC: 44 MG/DL (ref 40–60)
HDLC SERPL: 4.5 {RATIO} (ref 0–5)
HGB UR QL STRIP: NEGATIVE
KETONES UR QL STRIP.AUTO: NEGATIVE MG/DL
LDLC SERPL CALC-MCNC: 141.2 MG/DL (ref 0–100)
LEUKOCYTE ESTERASE UR QL STRIP.AUTO: NEGATIVE
LIPID PROFILE,FLP: ABNORMAL
NITRITE UR QL STRIP.AUTO: NEGATIVE
PH UR STRIP: 5.5 [PH] (ref 5–8)
POTASSIUM SERPL-SCNC: 3.8 MMOL/L (ref 3.5–5.5)
PROT SERPL-MCNC: 6.9 G/DL (ref 6.4–8.2)
PROT UR STRIP-MCNC: NEGATIVE MG/DL
SODIUM SERPL-SCNC: 136 MMOL/L (ref 136–145)
SP GR UR REFRACTOMETRY: 1.02 (ref 1–1.03)
TRIGL SERPL-MCNC: 69 MG/DL (ref ?–150)
TSH SERPL DL<=0.05 MIU/L-ACNC: 3.81 UIU/ML (ref 0.36–3.74)
UROBILINOGEN UR QL STRIP.AUTO: 0.2 EU/DL (ref 0.2–1)
VLDLC SERPL CALC-MCNC: 13.8 MG/DL

## 2020-01-21 PROCEDURE — 36415 COLL VENOUS BLD VENIPUNCTURE: CPT

## 2020-01-21 PROCEDURE — 80061 LIPID PANEL: CPT

## 2020-01-21 PROCEDURE — 80053 COMPREHEN METABOLIC PANEL: CPT

## 2020-01-21 PROCEDURE — 81003 URINALYSIS AUTO W/O SCOPE: CPT

## 2020-01-21 PROCEDURE — 84443 ASSAY THYROID STIM HORMONE: CPT

## 2020-01-21 NOTE — PROGRESS NOTES
Subjective     Patient ID:  Becky Rodarte is a 76 y.o. ( 1945) female who presents for the following:   Hospital Follow Up      HPI   Fasting today. Went to ER 20 with shortness of breath. COPD exacerbation. She was in the hospital for 5 days. Sent home with oxygen and mucinex. Abt and steroids in hosp, but not at home. Coughing up less sputum now and less frequent now. Feeling less short of breath now. Still on 4 liters of oxygen via nasal cannula. Has a pulse ox at home. She was off oxygen to take a shower and dress and her pulse ox was 95% without supplemental oxygen. Using flutter valve. Still somewhat fatigued, and with occasional cough, shortness of breath, and wheezing. reports nasal congestion and rhinorrhea which is gradually resolving. Review of Systems   Constitutional: Positive for fatigue. Negative for appetite change, chills, diaphoresis and fever. HENT: Positive for congestion and rhinorrhea. Negative for ear discharge, ear pain, hearing loss, postnasal drip, sinus pressure, sinus pain, sneezing, sore throat and trouble swallowing. Eyes: Negative for discharge, redness and visual disturbance. Respiratory: Positive for cough, shortness of breath and wheezing. Negative for chest tightness. Cardiovascular: Negative for chest pain. Gastrointestinal: Negative for abdominal pain, diarrhea, nausea and vomiting. Genitourinary: Negative for decreased urine volume. Musculoskeletal: Negative for myalgias, neck pain and neck stiffness. Skin: Negative for rash. Allergic/Immunologic: Negative for environmental allergies. Neurological: Negative for dizziness, light-headedness and headaches. Past Medical History, Past Surgery History, Allergies, Social History, and Family History were reviewed and updated.       Past Medical History:   Diagnosis Date    Acute bronchitis 2015    Advance directive discussed with patient 2016    Pt would like to appoint her son, Dia Has as her medical decision maker in the event that she can no longer do so. Phone number is [phone]. Her secondary person is her next door [employer] Phone number is [phone]. If her death is imminent and medical treatment will not help her recover, pt does want life prolonging measures.   If her condition makes her unawar    Bilateral leg pain 10/22/2015    Elevated TSH 10/22/2015    Hashimoto's thyroiditis 2015    Hernia, abdominal     History of benign breast tumor     Menopause     Nicotine dependence in remission 2016    Obesity, Class I, BMI 30-34.9 2016    Prediabetes 10/22/2015    Simple chronic bronchitis ([facility] 2016    Skin lesion 2016    Varicose vein of leg      Past Surgical History:   Procedure Laterality Date    HX BREAST BIOPSY Right     Milk ducts removed    HX CYST INCISION AND DRAINAGE Right     37 y/o    HX HERNIA REPAIR      abdominal x2    HX HERNIA REPAIR  09/15/2016    ROBOTIC REPAIR OF RECURRENT INCARCERATED HERNIA WITH EXTENSIVE LYSIS OF ADHESIONS WITH PLACEMENT OF MESH     Family History   Problem Relation Age of Onset    Breast Cancer Mother 36        43s    Lung Disease Mother     Elevated Lipids Mother     Stroke Father     Diabetes Paternal Grandfather      Social History     Socioeconomic History    Marital status: SINGLE     Spouse name: Not on file    Number of children: Not on file    Years of education: Not on file    Highest education level: Not on file   Occupational History    Not on file   Social Needs    Financial resource strain: Not on file    Food insecurity:     Worry: Not on file     Inability: Not on file    Transportation needs:     Medical: Not on file     Non-medical: Not on file   Tobacco Use    Smoking status: Former Smoker     Packs/day: 1.00     Years: 30.00     Pack years: 30.00     Last attempt to quit: 1992     Years since quittin.0    Smokeless tobacco: Never Used   Substance and Sexual Activity    Alcohol use: Yes     Alcohol/week: 2.0 standard drinks     Types: 2 Glasses of wine per week     Comment: Occasionally     Drug use: No    Sexual activity: Not Currently   Lifestyle    Physical activity:     Days per week: Not on file     Minutes per session: Not on file    Stress: Not on file   Relationships    Social connections:     Talks on phone: Not on file     Gets together: Not on file     Attends Jew service: Not on file     Active member of club or organization: Not on file     Attends meetings of clubs or organizations: Not on file     Relationship status: Not on file    Intimate partner violence:     Fear of current or ex partner: Not on file     Emotionally abused: Not on file     Physically abused: Not on file     Forced sexual activity: Not on file   Other Topics Concern    Not on file   Social History Narrative    Not on file     No Known Allergies  Current Outpatient Medications on File Prior to Visit   Medication Sig Dispense Refill    ipratropium (ATROVENT HFA) 17 mcg/actuation inhaler 2 puffs every 8 hours. 3 Inhaler 4    budesonide-formoterol (SYMBICORT) 80-4.5 mcg/actuation HFAA Take 2 Puffs by inhalation two (2) times a day. 3 Inhaler 4    levothyroxine (SYNTHROID) 50 mcg tablet Take 1 Tab by mouth Daily (before breakfast). 90 Tab 3    omega-3 fatty acids-vitamin e (FISH OIL) 1,000 mg cap Take 1 capsule by mouth.  calcium-cholecalciferol, D3, (CALTRATE 600+D) tablet Take 1 tablet by mouth daily.  aspirin delayed-release 81 mg tablet Take  by mouth daily. No current facility-administered medications on file prior to visit. Objective     Visit Vitals  /61   Pulse 77   Temp 98 °F (36.7 °C) (Oral)   Resp 20   Ht 5' 8\" (1.727 m)   Wt 201 lb (91.2 kg)   SpO2 95%   BMI 30.56 kg/m²     No LMP recorded.  Patient is postmenopausal.    Physical Exam  Constitutional:       General: She is not in acute distress. Appearance: She is well-developed. She is not toxic-appearing or diaphoretic. HENT:      Right Ear: Hearing, tympanic membrane and ear canal normal.      Left Ear: Hearing, tympanic membrane and ear canal normal.      Nose: Mucosal edema and rhinorrhea present. Mouth/Throat:      Pharynx: No oropharyngeal exudate, posterior oropharyngeal erythema or uvula swelling. Tonsils: No tonsillar abscesses. Eyes:      General:         Right eye: No discharge. Left eye: No discharge. Conjunctiva/sclera: Conjunctivae normal.   Neck:      Musculoskeletal: Neck supple. Cardiovascular:      Rate and Rhythm: Normal rate and regular rhythm. Heart sounds: Normal heart sounds. No murmur. Pulmonary:      Effort: Pulmonary effort is normal. No respiratory distress. Breath sounds: Rales (Scattered) present. No decreased breath sounds, wheezing or rhonchi. Lymphadenopathy:      Cervical: No cervical adenopathy. Skin:     General: Skin is warm and dry. Findings: No rash. Neurological:      Mental Status: She is alert and oriented to person, place, and time. LABS   Component      Latest Ref Rng & Units 1/6/2020 1/6/2020 1/6/2020 1/6/2020           3:15 PM  3:15 PM  3:15 PM  3:15 PM   WBC      4.6 - 13.2 K/uL    6.6   RBC      4.20 - 5.30 M/uL    4.37   HGB      12.0 - 16.0 g/dL    14.2   HCT      35.0 - 45.0 %    42.9   MCV      74.0 - 97.0 FL    98.2 (H)   MCH      24.0 - 34.0 PG    32.5   MCHC      31.0 - 37.0 g/dL    33.1   RDW      11.6 - 14.5 %    13.8   PLATELET      940 - 826 K/uL    192   MPV      9.2 - 11.8 FL    10.3   NEUTROPHILS      40 - 73 %    80 (H)   LYMPHOCYTES      21 - 52 %    15 (L)   MONOCYTES      3 - 10 %    5   EOSINOPHILS      0 - 5 %    0   BASOPHILS      0 - 2 %    0   ABS. NEUTROPHILS      1.8 - 8.0 K/UL    5.3   ABS. LYMPHOCYTES      0.9 - 3.6 K/UL    1.0   ABS. MONOCYTES      0.05 - 1.2 K/UL    0.4   ABS.  EOSINOPHILS      0.0 - 0.4 K/UL    0.0   ABS. BASOPHILS      0.0 - 0.1 K/UL    0.0   DF          AUTOMATED   Sodium      136 - 145 mmol/L  138     Potassium      3.5 - 5.5 mmol/L  4.1     Chloride      100 - 111 mmol/L  103     CO2      21 - 32 mmol/L  26     Anion gap      3.0 - 18 mmol/L  9     Glucose      74 - 99 mg/dL  78     BUN      7.0 - 18 MG/DL  18     Creatinine      0.6 - 1.3 MG/DL  1.22     BUN/Creatinine ratio      12 - 20    15     GFR est AA      >60 ml/min/1.73m2  52 (L)     GFR est non-AA      >60 ml/min/1.73m2  43 (L)     Calcium      8.5 - 10.1 MG/DL  8.5     Bilirubin, total      0.2 - 1.0 MG/DL  0.7     ALT (SGPT)      13 - 56 U/L  14     AST      10 - 38 U/L  17     Alk. phosphatase      45 - 117 U/L  89     Protein, total      6.4 - 8.2 g/dL  8.0     Albumin      3.4 - 5.0 g/dL  3.7     Globulin      2.0 - 4.0 g/dL  4.3 (H)     A-G Ratio      0.8 - 1.7    0.9     Color             Appearance             Specific gravity      1.005 - 1.030         pH (UA)      5.0 - 8.0         Protein      NEG mg/dL       Glucose      NEG mg/dL       Ketone      NEG mg/dL       Bilirubin      NEG         Blood      NEG         Urobilinogen      0.2 - 1.0 EU/dL       Nitrites      NEG         Leukocyte Esterase      NEG         Magnesium      1.6 - 2.6 mg/dL   2.0    Troponin-I, Qt.      0.0 - 0.045 NG/ML <0.02        Component      Latest Ref Rng & Units 1/6/2020           3:15 PM   WBC      4.6 - 13.2 K/uL    RBC      4.20 - 5.30 M/uL    HGB      12.0 - 16.0 g/dL    HCT      35.0 - 45.0 %    MCV      74.0 - 97.0 FL    MCH      24.0 - 34.0 PG    MCHC      31.0 - 37.0 g/dL    RDW      11.6 - 14.5 %    PLATELET      742 - 306 K/uL    MPV      9.2 - 11.8 FL    NEUTROPHILS      40 - 73 %    LYMPHOCYTES      21 - 52 %    MONOCYTES      3 - 10 %    EOSINOPHILS      0 - 5 %    BASOPHILS      0 - 2 %    ABS. NEUTROPHILS      1.8 - 8.0 K/UL    ABS. LYMPHOCYTES      0.9 - 3.6 K/UL    ABS. MONOCYTES      0.05 - 1.2 K/UL    ABS.  EOSINOPHILS 0.0 - 0.4 K/UL    ABS. BASOPHILS      0.0 - 0.1 K/UL    DF          Sodium      136 - 145 mmol/L    Potassium      3.5 - 5.5 mmol/L    Chloride      100 - 111 mmol/L    CO2      21 - 32 mmol/L    Anion gap      3.0 - 18 mmol/L    Glucose      74 - 99 mg/dL    BUN      7.0 - 18 MG/DL    Creatinine      0.6 - 1.3 MG/DL    BUN/Creatinine ratio      12 - 20      GFR est AA      >60 ml/min/1.73m2    GFR est non-AA      >60 ml/min/1.73m2    Calcium      8.5 - 10.1 MG/DL    Bilirubin, total      0.2 - 1.0 MG/DL    ALT (SGPT)      13 - 56 U/L    AST      10 - 38 U/L    Alk. phosphatase      45 - 117 U/L    Protein, total      6.4 - 8.2 g/dL    Albumin      3.4 - 5.0 g/dL    Globulin      2.0 - 4.0 g/dL    A-G Ratio      0.8 - 1.7      Color       YELLOW   Appearance       CLEAR   Specific gravity      1.005 - 1.030   1.018   pH (UA)      5.0 - 8.0   7.5   Protein      NEG mg/dL NEGATIVE   Glucose      NEG mg/dL NEGATIVE   Ketone      NEG mg/dL TRACE (A)   Bilirubin      NEG   NEGATIVE   Blood      NEG   NEGATIVE   Urobilinogen      0.2 - 1.0 EU/dL 0.2   Nitrites      NEG   NEGATIVE   Leukocyte Esterase      NEG   NEGATIVE   Magnesium      1.6 - 2.6 mg/dL    Troponin-I, Qt.      0.0 - 0.045 NG/ML      TESTS  IMPRESSION:     1. Chronic lung disease with increasing biapical pleuroparenchymal scarring and  with a questionable subcentimeter nodule in the right upper lobe. Nonemergent  CT of the chest is suggested, as clinically warranted for further evaluation.     2. No acute findings are suggested. Assessment and Plan     1. COPD with exacerbation ([address]  Symptoms improving. May use supplemental oxygen at 2 L via nasal cannula only as needed now. Continue to monitor pulse ox  several times a day and as needed if feeling short of breath. Should be 94% or higher. May take Mucinex and Coricidin HBP as needed for congestion. Continue inhalers. 2. Hashimoto's thyroiditis  Continue levothyroxine.   Dose adjustment if indicated after lab results.  - TSH 3RD GENERATION; Future    3. Screening cholesterol level  - LIPID PANEL; Future    4. Impaired renal function  Impaired renal function on lab work from the hospital.  Recheck. - METABOLIC PANEL, COMPREHENSIVE; Future  - URINALYSIS W/ RFLX MICROSCOPIC; Future    5. Abnormal finding on chest xray  Questionable lung nodule on chest x-ray. CT scan in 2 weeks. - CT CHEST WO CONT; Future      Follow-up and Dispositions    · Return in about 6 months (around 7/21/2020) for Medication follow up, sooner as needed. Risks, benefits, and alternatives of the medications and treatment plan prescribed today were discussed, and patient expressed understanding. Printed after visit summary was given to patient and reviewed. All patient questions and concerns were addressed. Plan follow-up as discussed or as needed if any worsening symptoms or change in condition.            Signed electronically by Jaycee Siemens, ROOPA, HEATH-BC

## 2020-01-21 NOTE — PROGRESS NOTES
1. Have you been to the ER, urgent care clinic since your last visit? Hospitalized since your last visit? Yes When: 1/2020 Where: [facility] Reason for visit: SOB    2. Have you seen or consulted any other health care providers outside of the [address] [place] [address] since your last visit? Include any pap smears or colon screening.  No

## 2020-01-21 NOTE — PATIENT INSTRUCTIONS
Coricidin HBP if you need a decongestant. COPD Exacerbation Plan: Care Instructions Your Care Instructions If you have chronic obstructive pulmonary disease (COPD), your usual shortness of breath could suddenly get worse. You may start coughing more and have more mucus. This flare-up is called a COPD exacerbation (say \"fy-EUW-es-BAY-pamela\"). A lung infection or air pollution could set off an exacerbation. Sometimes it can happen after a quick change in temperature or being around chemicals. Work with your doctor to make a plan for dealing with an exacerbation. You can better manage it if you plan ahead. Follow-up care is a key part of your treatment and safety. Be sure to make and go to all appointments, and call your doctor if you are having problems. It's also a good idea to know your test results and keep a list of the medicines you take. How can you care for yourself at home? During an exacerbation · Do not panic if you start to have one. Quick treatment at home may help you prevent serious breathing problems. If you have a COPD exacerbation plan that you developed with your doctor, follow it. · Take your medicines exactly as your doctor tells you. 
? Use your inhaler as directed by your doctor. If your symptoms do not get better after you use your medicine, have someone take you to the emergency room. Call an ambulance if necessary. ? With inhaled medicines, a spacer or a nebulizer may help you get more medicine to your lungs. Ask your doctor or pharmacist how to use them properly. Practice using the spacer in front of a mirror before you have an exacerbation. This may help you get the medicine into your lungs quickly. ? If your doctor has given you steroid pills, take them as directed. ? Your doctor may have given you a prescription for antibiotics, which you can fill if you need it. ? Talk to your doctor if you have any problems with your medicine. And call your doctor if you have to use your antibiotic or steroid pills. Preventing an exacerbation · Do not smoke. This is the most important step you can take to prevent more damage to your lungs and prevent problems. If you already smoke, it is never too late to stop. If you need help quitting, talk to your doctor about stop-smoking programs and medicines. These can increase your chances of quitting for good. · Take your daily medicines as prescribed. · Avoid colds and flu. ? Get a pneumococcal vaccine. ? Get a flu vaccine each year, as soon as it is available. Ask those you live or work with to do the same, so they will not get the flu and infect you. ? Try to stay away from people with colds or the flu. ? Wash your hands often. · Avoid secondhand smoke; air pollution; cold, dry air; hot, humid air; and high altitudes. Stay at home with your windows closed when air pollution is bad. · Learn breathing techniques for COPD, such as breathing through pursed lips. These techniques can help you breathe easier during an exacerbation. When should you call for help? Call 911 anytime you think you may need emergency care. For example, call if: 
  · You have severe trouble breathing.  
  · You have severe chest pain.  
 Call your doctor now or seek immediate medical care if: 
  · You have new or worse shortness of breath.  
  · You develop new chest pain.  
  · You are coughing more deeply or more often, especially if you notice more mucus or a change in the color of your mucus.  
  · You cough up blood.  
  · You have new or increased swelling in your legs or belly.  
  · You have a fever.  
 Watch closely for changes in your health, and be sure to contact your doctor if: 
  · You need to use your antibiotic or steroid pills.  
  · Your symptoms are getting worse. Where can you learn more? Go to http://sujata-cara.info/. Enter C614 in the search box to learn more about \"COPD Exacerbation Plan: Care Instructions. \" Current as of: June 9, 2019 Content Version: 12.2 © 0454-6944 [employer]. Care instructions adapted under license by [employer] (which disclaims liability or warranty for this information). If you have questions about a medical condition or this instruction, always ask your healthcare professional. [address] any warranty or liability for your use of this information.

## 2020-01-27 DIAGNOSIS — E06.3 HASHIMOTO'S THYROIDITIS: ICD-10-CM

## 2020-01-28 ENCOUNTER — PATIENT OUTREACH (OUTPATIENT)
Dept: FAMILY MEDICINE CLINIC | Facility: CLINIC | Age: 75
End: 2020-01-28

## 2020-01-28 NOTE — PROGRESS NOTES
Transition Of Care Follow Up    Care Coordinator(CC) contacted the patient by telephone to perform post hospital discharge assessment. Verified name and  with patient as identifiers. Provided introduction to self, and explanation of the Care Coordinator role. Patient states she is almost back to 100%. No sob or wheezing to report. She states she has stopped using the O2 but she use sometimes at [facility] because it can't hurt\". Patient would like a refill for her thyroid medication. I will send a refill request to her pcp. Patient reminded that there are physicians on call 24 hours a day / 7 days a week (M-F 5pm to 8am and from Friday 5pm until Monday 8a for the weekend) should the patient have questions or concerns. Patient reminded to call 911 if situation is emergent or patient feels the situation is emergent. Pt verbalizes understanding. Goals      Attends follow-up appointments as directed. Target Date:  2020  [facility] with Dr. Marbella Salcedo with [address] [place] [address] scheduled for 2020.  Prevent complications post hospitalization. Target Date:  2020       Supportive resources in place to maintain patient in the community (ie.  Home Health, DME equipment, refer to, medication assistant plan, etc.)      Target Date:  2020   Patient reports she has received home oxygen  Per [employer] company providing home Oxygen is [employer]          Future Appointments   Date Time Provider Erick Houser   2020 11:15 AM [facility] CT RM 2 [facility]   2020 10:30 AM SHANNAN Blanca   9/15/2020 11:45 AM [facility] CHEYENNE STEREO BX RM 1 [facility]

## 2020-01-29 RX ORDER — LEVOTHYROXINE SODIUM 50 UG/1
50 TABLET ORAL
Qty: 30 TAB | Refills: 5 | Status: SHIPPED | OUTPATIENT
Start: 2020-01-29 | End: 2020-05-21 | Stop reason: SDUPTHER

## 2020-01-30 ENCOUNTER — TELEPHONE (OUTPATIENT)
Dept: FAMILY MEDICINE CLINIC | Age: 75
End: 2020-01-30

## 2020-01-30 NOTE — TELEPHONE ENCOUNTER
Called patient told lab results patient states the day she had lab draw for thyroid she did not take her pill. She is concerned that it may have affected the results. Patient will have her CT done next Tues. Made appointment to see Kenny Valladares. for results.

## 2020-01-31 NOTE — TELEPHONE ENCOUNTER
Not taking the medication that morning would not significantly affect the results. I will talk with her more about it at her follow up.

## 2020-02-04 ENCOUNTER — HOSPITAL ENCOUNTER (OUTPATIENT)
Dept: CT IMAGING | Age: 75
Discharge: HOME | End: 2020-02-04
Attending: NURSE PRACTITIONER
Payer: MEDICARE

## 2020-02-04 DIAGNOSIS — R93.89 ABNORMAL FINDING ON CHEST XRAY: ICD-10-CM

## 2020-02-04 PROCEDURE — 71250 CT THORAX DX C-: CPT

## 2020-02-05 ENCOUNTER — PATIENT OUTREACH (OUTPATIENT)
Dept: FAMILY MEDICINE CLINIC | Facility: CLINIC | Age: 75
End: 2020-02-05

## 2020-02-05 NOTE — PROGRESS NOTES
Transition Of Care Follow Up    Care Coordinator(CC) contacted the patient by telephone to perform post hospital discharge assessment. Verified name and  with patient as identifiers. Provided introduction to self, and explanation of the Care Coordinator role. Patient states she is doing well. No sob or wheezing to report. Went for CT on . No assistance is needed. She will follow up with pcp on . Patient reminded that there are physicians on call 24 hours a day / 7 days a week (M-F 5pm to 8am and from Friday 5pm until Monday 8a for the weekend) should the patient have questions or concerns. Patient reminded to call 911 if situation is emergent or patient feels the situation is emergent. Pt verbalizes understanding.   Future Appointments   Date Time Provider Erick Houser   2020 11:00 AM SHANNAN More SCHED   2020 10:30 AM SHANNAN More SCHED   9/15/2020 11:45 AM [facility] CHEYENNE STEREO BX RM 1 [facility]

## 2020-02-13 ENCOUNTER — PATIENT OUTREACH (OUTPATIENT)
Dept: FAMILY MEDICINE CLINIC | Facility: CLINIC | Age: 75
End: 2020-02-13

## 2020-02-13 ENCOUNTER — OFFICE VISIT (OUTPATIENT)
Dept: FAMILY MEDICINE CLINIC | Age: 75
End: 2020-02-13

## 2020-02-13 VITALS
WEIGHT: 196 LBS | DIASTOLIC BLOOD PRESSURE: 75 MMHG | TEMPERATURE: 97.4 F | RESPIRATION RATE: 20 BRPM | OXYGEN SATURATION: 95 % | HEART RATE: 77 BPM | HEIGHT: 68 IN | BODY MASS INDEX: 29.7 KG/M2 | SYSTOLIC BLOOD PRESSURE: 135 MMHG

## 2020-02-13 DIAGNOSIS — E06.3 HASHIMOTO'S THYROIDITIS: ICD-10-CM

## 2020-02-13 DIAGNOSIS — J44.1 COPD WITH EXACERBATION (HCC): Primary | ICD-10-CM

## 2020-02-13 NOTE — PROGRESS NOTES
Subjective     Patient ID:  Myriam Haywood is a 76 y.o. ( 1945) female who presents for the following:   No chief complaint on file. HPI     COPD:  Significantly improved since recent hospitalization. SPO2 running 94 to 95% at home. Shortness of breath now only with stairs, but pursed lip breathing helps. Dr. Bipin Lira office no longer participating with her insurance, so she needs a new pulmonologist.    Hypothyroidism:  She reports mild hair loss and dry skin although she thinks this could be because it is winter. Last TSH was 3.81. It has been stable in that range over the last few years. She has been on the same dose of levothyroxine, 50 mcg daily for several years. Review of Systems   Constitutional: Negative for fatigue and fever. HENT: Negative for congestion and sore throat. Respiratory: Positive for shortness of breath. Negative for cough, chest tightness and wheezing. Cardiovascular: Negative for chest pain, palpitations and leg swelling. Gastrointestinal: Negative for constipation. Endocrine: Negative for cold intolerance and heat intolerance. Genitourinary: Negative for dysuria. Musculoskeletal: Negative for back pain. Skin:        Mild dry skin and hair loss   Neurological: Negative for dizziness and headaches. Past Medical History, Past Surgery History, Allergies, Social History, and Family History were reviewed and updated.       Patient Active Problem List   Diagnosis Code    Hyperlipidemia with target LDL less than 70 E78.5    Hashimoto's thyroiditis E06.3    Nicotine dependence in remission F17.201    Obesity, Class I, BMI 30-34.9 E66.9    Advance directive discussed with patient Z70.80    COPD with exacerbation ([facility] J44.1    Influenza J11.1    Acute bronchitis J20.9    Acute hypoxemic respiratory failure ([facility] J96.01    Hypothyroid E03.9     Past Medical History:   Diagnosis Date    Acute bronchitis 2015    Advance directive discussed with patient 2016    Pt would like to appoint her son, Radha Carr as her medical decision maker in the event that she can no longer do so. Phone number is [phone]. Her secondary person is her next door [employer]. Phone number is [phone]. If her death is imminent and medical treatment will not help her recover, pt does want life prolonging measures. If her condition makes her unawar    Bilateral leg pain 10/22/2015    Elevated TSH 10/22/2015    Hashimoto's thyroiditis 2015    Hernia, abdominal     History of benign breast tumor     Menopause     Nicotine dependence in remission 2016    Obesity, Class I, BMI 30-34.9 2016    Prediabetes 10/22/2015    Simple chronic bronchitis ([address] 2016    Skin lesion 2016    Varicose vein of leg      Patient Care Team:  Tino Dean NP as PCP - General (Nurse Practitioner)  Tino Dean NP as PCP - [facility] Empaneled Provider  Clint Walter MD (Gastroenterology)  Walt Lacey MD (Dermatology)  Jaky Cates, SHANNAN (Nurse Practitioner)  Elise Coulter MD as Consulting Provider (General Surgery)    Past Surgical History:   Procedure Laterality Date    HX BREAST BIOPSY Right     Milk ducts removed    HX CYST INCISION AND DRAINAGE Right     35 y/o    HX HERNIA REPAIR      abdominal x2    HX HERNIA REPAIR  09/15/2016    ROBOTIC REPAIR OF RECURRENT INCARCERATED HERNIA WITH EXTENSIVE LYSIS OF ADHESIONS WITH PLACEMENT OF MESH     Family History   Problem Relation Age of Onset    Breast Cancer Mother 36        43s    Lung Disease Mother     Elevated Lipids Mother     Stroke Father     Diabetes Paternal Grandfather      Social History     Tobacco Use    Smoking status: Former Smoker     Packs/day: 1.00     Years: 30.00     Pack years: 30.00     Last attempt to quit: 1992     Years since quittin.1    Smokeless tobacco: Never Used   Substance Use Topics    Alcohol use:  Yes Alcohol/week: 2.0 standard drinks     Types: 2 Glasses of wine per week     Comment: Occasionally     Drug use: No     No Known Allergies  Current Outpatient Medications on File Prior to Visit   Medication Sig Dispense Refill    levothyroxine (SYNTHROID) 50 mcg tablet Take 1 Tab by mouth Daily (before breakfast). 30 Tab 5    ipratropium (ATROVENT HFA) 17 mcg/actuation inhaler 2 puffs every 8 hours. 3 Inhaler 4    budesonide-formoterol (SYMBICORT) 80-4.5 mcg/actuation HFAA Take 2 Puffs by inhalation two (2) times a day. 3 Inhaler 4    omega-3 fatty acids-vitamin e (FISH OIL) 1,000 mg cap Take 1 capsule by mouth.  calcium-cholecalciferol, D3, (CALTRATE 600+D) tablet Take 1 tablet by mouth daily.  aspirin delayed-release 81 mg tablet Take  by mouth daily. No current facility-administered medications on file prior to visit. Health Maintenance Due   Topic Date Due    Shingrix Vaccine Age 49> (1 of 2) 07/07/1995         Objective     Visit Vitals  /75 (BP 1 Location: Right arm, BP Patient Position: Sitting)   Pulse 77   Temp 97.4 °F (36.3 °C) (Oral)   Resp 20   Ht 5' 8\" (1.727 m)   Wt 196 lb (88.9 kg)   SpO2 95%   BMI 29.80 kg/m²     No LMP recorded. Patient is postmenopausal.    Physical Exam  Constitutional:       General: She is not in acute distress. Appearance: She is well-developed. She is not diaphoretic. Neck:      Thyroid: No thyroid mass, thyromegaly or thyroid tenderness. Cardiovascular:      Rate and Rhythm: Normal rate and regular rhythm. Heart sounds: Normal heart sounds. No murmur. Pulmonary:      Effort: Pulmonary effort is normal. No respiratory distress. Breath sounds: Normal breath sounds. Neurological:      Mental Status: She is alert and oriented to person, place, and time.            LABS   Component      Latest Ref Rng & Units 1/21/2020 1/21/2020 1/21/2020 1/21/2020           9:14 AM  9:14 AM  9:14 AM  9:14 AM   Sodium      136 - 145 mmol/L  136 Potassium      3.5 - 5.5 mmol/L  3.8     Chloride      100 - 111 mmol/L  101     CO2      21 - 32 mmol/L  29     Anion gap      3.0 - 18 mmol/L  6     Glucose      74 - 99 mg/dL  103 (H)     BUN      7.0 - 18 MG/DL  13     Creatinine      0.6 - 1.3 MG/DL  0.93     BUN/Creatinine ratio      12 - 20    14     GFR est AA      >60 ml/min/1.73m2  >60     GFR est non-AA      >60 ml/min/1.73m2  59 (L)     Calcium      8.5 - 10.1 MG/DL  8.3 (L)     Bilirubin, total      0.2 - 1.0 MG/DL  0.5     ALT (SGPT)      13 - 56 U/L  17     AST      10 - 38 U/L  13     Alk. phosphatase      45 - 117 U/L  68     Protein, total      6.4 - 8.2 g/dL  6.9     Albumin      3.4 - 5.0 g/dL  2.8 (L)     Globulin      2.0 - 4.0 g/dL  4.1 (H)     A-G Ratio      0.8 - 1.7    0.7 (L)     Color          YELLOW   Appearance          CLEAR   Specific gravity      1.005 - 1.030      1.019   pH (UA)      5.0 - 8.0      5.5   Protein      NEG mg/dL    NEGATIVE   Glucose      NEG mg/dL    NEGATIVE   Ketone      NEG mg/dL    NEGATIVE   Bilirubin      NEG      NEGATIVE   Blood      NEG      NEGATIVE   Urobilinogen      0.2 - 1.0 EU/dL    0.2   Nitrites      NEG      NEGATIVE   Leukocyte Esterase      NEG      NEGATIVE   Cholesterol, total      <200 MG/DL   199    Triglyceride      <150 MG/DL   69    HDL Cholesterol      40 - 60 MG/DL   44    LDL, calculated      0 - 100 MG/DL   141.2 (H)    VLDL, calculated      MG/DL   13.8    CHOL/HDL Ratio      0 - 5.0     4.5    TSH      0.36 - 3.74 uIU/mL 3.81 (H)        TESTS  Chest CT 2/4/20  IMPRESSION:  1. No suspicious pulmonary nodule or mass correlates with the region of nodular  opacity on prior chest radiograph, suggesting that it was likely related to  artifactual summation. There is a 2 mm nodule within the right lower lobe  superior segment that warrants no additional dedicated follow-up according to  [address] [facility] Pulmonary Nodule Guidelines (revised 2017).   2. Advanced pulmonary emphysema with mild perihilar/basilar bronchiectasis and  bronchial wall thickening. No infiltrate or acute pulmonary abnormality. 3. Prominent main pulmonary artery, suggestive of chronic pulmonary arterial  Hypertension. Assessment and Plan     1. COPD with exacerbation ([address]  Chest CT did not redemonstrate the nodule seen on chest x-ray. It did show signs of possible pulmonary hypertension. Referred to pulmonology.  - REFERRAL TO PULMONARY DISEASE    2. Hashimoto's thyroiditis  Increased to 75 mcg daily. Follow-up to recheck in 8 weeks. - TSH 3RD GENERATION; Future  - T4, FREE; Future      Follow-up and Dispositions    · Return for labs in 8 weeks. Risks, benefits, and alternatives of the medications and treatment plan prescribed today were discussed, and patient expressed understanding. Printed after visit summary was given to patient and reviewed. All patient questions and concerns were addressed. Plan follow-up as discussed or as needed if any worsening symptoms or change in condition.            Signed electronically by Reyna Yeager, ROOPA, HEATH-BC

## 2020-02-13 NOTE — PROGRESS NOTES
Room #  21  Chief Complaint:    X-ray results  HPI:    Antonio Benz is a 76 y.o. female who presents today for c/o x-ray results    1. Have you been to the ER, urgent care clinic since your last visit? Hospitalized since your last visit? NO When:    2. Have you seen or consulted any other health care providers outside of the [address] [place] [address] since your last visit? Include any pap smears or colon screening. NO  When :  Reason:    Health Maintenance reviewed Yes    Health Maintenance Due   Topic Date Due    Shingrix Vaccine Age 49> (1 of 2) 07/07/1995              n

## 2020-02-13 NOTE — PROGRESS NOTES
Patient has graduated from the Transitions of Care Coordination  program on 2/13/2020. Patient's symptoms are stable at this time. Patient/family has the ability to self-manage. Care management goals have been completed at this time. No further care coordinator follow up scheduled. Goals Addressed                 This Visit's Progress     COMPLETED: Attends follow-up appointments as directed. Target Date:  2/11/2020 1-  [facility] with Dr. Enrique Blake with [address] [place] [address] scheduled for 1/21/2020.  COMPLETED: Prevent complications post hospitalization. Target Date:  2/11/2020       COMPLETED: Supportive resources in place to maintain patient in the community (ie. Home Health, DME equipment, refer to, medication assistant plan, etc.)        Target Date:  2/11/2020 1/13/2020   Patient reports she has received home oxygen  Per [employer] company providing home Oxygen is [employer]          Patient seen today by pcp.   Patients upcoming visits:    Future Appointments   Date Time Provider Erick Housre   7/21/2020 10:30 AM SHANNAN Forde   9/15/2020 11:45 AM [facility] CHEYENNE DAMIAN BX RM 1 [facility]

## 2020-05-15 NOTE — PROGRESS NOTES
5/15/2020    Was faxed [employer] for O2 last week (for Dhaval Szymanski 1/10/2020) but was off week for discharging MD. Was able to get O2 CNM signed by Dr Jeff Gee today and faxed to [facility] at [address] [place] [address]  [phone]  [phone]

## 2020-07-29 DIAGNOSIS — J44.1 COPD WITH EXACERBATION (HCC): ICD-10-CM

## 2020-07-29 RX ORDER — BUDESONIDE AND FORMOTEROL FUMARATE DIHYDRATE 80; 4.5 UG/1; UG/1
2 AEROSOL RESPIRATORY (INHALATION) 2 TIMES DAILY
Qty: 3 INHALER | Refills: 4 | Status: SHIPPED | OUTPATIENT
Start: 2020-07-29 | End: 2021-03-12 | Stop reason: SDUPTHER

## 2020-07-29 NOTE — TELEPHONE ENCOUNTER
Pt called in and was requesting a refill of her Symbicort and was wanting a 90 day supply if possible. Please advise.

## 2020-09-15 ENCOUNTER — HOSPITAL ENCOUNTER (OUTPATIENT)
Dept: MAMMOGRAPHY | Age: 75
Discharge: HOME | End: 2020-09-15
Attending: INTERNAL MEDICINE
Payer: MEDICARE

## 2020-09-15 DIAGNOSIS — Z12.31 VISIT FOR SCREENING MAMMOGRAM: ICD-10-CM

## 2020-09-15 PROCEDURE — 77067 SCR MAMMO BI INCL CAD: CPT

## 2021-02-24 ENCOUNTER — VIRTUAL VISIT (OUTPATIENT)
Dept: FAMILY MEDICINE CLINIC | Age: 76
End: 2021-02-24
Payer: MEDICARE

## 2021-02-24 DIAGNOSIS — J44.1 COPD WITH EXACERBATION (HCC): ICD-10-CM

## 2021-02-24 DIAGNOSIS — E78.5 HYPERLIPIDEMIA WITH TARGET LDL LESS THAN 70: ICD-10-CM

## 2021-02-24 DIAGNOSIS — Z00.00 MEDICARE ANNUAL WELLNESS VISIT, SUBSEQUENT: ICD-10-CM

## 2021-02-24 DIAGNOSIS — E06.3 HASHIMOTO'S THYROIDITIS: Primary | ICD-10-CM

## 2021-02-24 PROCEDURE — 1090F PRES/ABSN URINE INCON ASSESS: CPT | Performed by: NURSE PRACTITIONER

## 2021-02-24 PROCEDURE — G8432 DEP SCR NOT DOC, RNG: HCPCS | Performed by: NURSE PRACTITIONER

## 2021-02-24 PROCEDURE — G8421 BMI NOT CALCULATED: HCPCS | Performed by: NURSE PRACTITIONER

## 2021-02-24 PROCEDURE — 1101F PT FALLS ASSESS-DOCD LE1/YR: CPT | Performed by: NURSE PRACTITIONER

## 2021-02-24 PROCEDURE — 3017F COLORECTAL CA SCREEN DOC REV: CPT | Performed by: NURSE PRACTITIONER

## 2021-02-24 PROCEDURE — 99214 OFFICE O/P EST MOD 30 MIN: CPT | Performed by: NURSE PRACTITIONER

## 2021-02-24 PROCEDURE — G0439 PPPS, SUBSEQ VISIT: HCPCS | Performed by: NURSE PRACTITIONER

## 2021-02-24 PROCEDURE — G8536 NO DOC ELDER MAL SCRN: HCPCS | Performed by: NURSE PRACTITIONER

## 2021-02-24 PROCEDURE — G8427 DOCREV CUR MEDS BY ELIG CLIN: HCPCS | Performed by: NURSE PRACTITIONER

## 2021-02-24 PROCEDURE — G8399 PT W/DXA RESULTS DOCUMENT: HCPCS | Performed by: NURSE PRACTITIONER

## 2021-02-24 RX ORDER — BISMUTH SUBSALICYLATE 262 MG
1 TABLET,CHEWABLE ORAL DAILY
COMMUNITY

## 2021-02-24 RX ORDER — LEVOTHYROXINE SODIUM 75 UG/1
75 TABLET ORAL
Qty: 90 TAB | Refills: 0 | Status: SHIPPED | OUTPATIENT
Start: 2021-02-24 | End: 2021-03-11 | Stop reason: SDUPTHER

## 2021-02-24 NOTE — PATIENT INSTRUCTIONS
Medicare Wellness Visit, Female The best way to live healthy is to have a lifestyle where you eat a well-balanced diet, exercise regularly, limit alcohol use, and quit all forms of tobacco/nicotine, if applicable. Regular preventive services are another way to keep healthy. Preventive services (vaccines, screening tests, monitoring & exams) can help personalize your care plan, which helps you manage your own care. Screening tests can find health problems at the earliest stages, when they are easiest to treat. [facility] follows the current, evidence-based guidelines published by the [facility] Myron Funez ([facility]) when recommending preventive services for our patients. Because we follow these guidelines, sometimes recommendations change over time as research supports it. (For example, mammograms used to be recommended annually. Even though Medicare will still pay for an annual mammogram, the newer guidelines recommend a mammogram every two years for women of average risk). Of course, you and your doctor may decide to screen more often for some diseases, based on your risk and your co-morbidities (chronic disease you are already diagnosed with). Preventive services for you include: - Medicare offers their members a free annual wellness visit, which is time for you and your primary care provider to discuss and plan for your preventive service needs. Take advantage of this benefit every year! 
-All adults over the age of 72 should receive the recommended pneumonia vaccines. Current USPSTF guidelines recommend a series of two vaccines for the best pneumonia protection.  
-All adults should have a flu vaccine yearly and a tetanus vaccine every 10 years.  
-All adults age 48 and older should receive the shingles vaccines (series of two vaccines). -All adults age 38-68 who are overweight should have a diabetes screening test once every three years. -All adults born between [birth year] and 1965 should be screened once for Hepatitis C. 
-Other screening tests and preventive services for persons with diabetes include: an eye exam to screen for diabetic retinopathy, a kidney function test, a foot exam, and stricter control over your cholesterol.  
-Cardiovascular screening for adults with routine risk involves an electrocardiogram (ECG) at intervals determined by your doctor.  
-Colorectal cancer screenings should be done for adults age 54-65 with no increased risk factors for colorectal cancer. There are a number of acceptable methods of screening for this type of cancer. Each test has its own benefits and drawbacks. Discuss with your doctor what is most appropriate for you during your annual wellness visit. The different tests include: colonoscopy (considered the best screening method), a fecal occult blood test, a fecal DNA test, and sigmoidoscopy. 
 
-A bone mass density test is recommended when a woman turns 65 to screen for osteoporosis. This test is only recommended one time, as a screening. Some providers will use this same test as a disease monitoring tool if you already have osteoporosis. -Breast cancer screenings are recommended every other year for women of normal risk, age 54-69. 
-Cervical cancer screenings for women over age 72 are only recommended with certain risk factors. Here is a list of your current Health Maintenance items (your personalized list of preventive services) with a due date: 
Health Maintenance Due Topic Date Due  Shingles Vaccine (1 of 2) 07/07/1995  Glaucoma Screening   07/06/2020

## 2021-02-24 NOTE — PROGRESS NOTES
Baltazar Donahue was seen by synchronous (real-time) audio-video technology DOXY on 2/24/2021. Consent: Baltazar Donahue, who was seen by synchronous (real-time) audio-video technology, and/or her healthcare decision maker, is aware that this patient-initiated, Telehealth encounter on 2/24/2021 is a billable service, with coverage as determined by her insurance carrier. She is aware that she may receive a bill and has provided verbal consent to proceed: yes  712  Subjective:     HPI:  Baltazar Donahue is a 76 y.o. female who was seen for the following:     Presents for routine follow up:     Hypothyroidism:   Increased levothyroxine to 75 mcg last year. Denies fatigue, hair loss, constipation, and other related symptoms. COPD:   Uses symbicort and Atrovent. Symptoms have been well controlled. Review of Systems   Constitutional: Negative for appetite change, chills, diaphoresis, fatigue, fever and unexpected weight change. Eyes: Negative for visual disturbance. Respiratory: Negative for cough, chest tightness, shortness of breath and wheezing. Cardiovascular: Negative for chest pain, palpitations and leg swelling. Gastrointestinal: Negative for abdominal distention, abdominal pain, blood in stool, constipation, diarrhea, nausea, rectal pain and vomiting. Endocrine: Negative for cold intolerance, heat intolerance, polydipsia, polyphagia and polyuria. Genitourinary: Negative for decreased urine volume, dysuria and frequency. Musculoskeletal: Negative for joint swelling and myalgias. Skin: Negative for rash and wound. Neurological: Negative for dizziness, weakness, light-headedness, numbness and headaches. Psychiatric/Behavioral: Negative for dysphoric mood and sleep disturbance. The patient is not nervous/anxious. Past Medical History, Past Surgery History, Allergies, Social History, and Family History were reviewed and updated.       Patient Active Problem List Diagnosis Code    Hyperlipidemia with target LDL less than 70 E78.5    Hashimoto's thyroiditis E06.3    Nicotine dependence in remission F17.201    Obesity, Class I, BMI 30-34.9 E66.9    Advance directive discussed with patient Z70.80    COPD with exacerbation ([facility] [address] J44.1    Influenza J11.1    Acute bronchitis J20.9    Acute hypoxemic respiratory failure (HCC) J96.01    Hypothyroid E03.9     Past Medical History:   Diagnosis Date    Acute bronchitis 12/28/2015    Advance directive discussed with patient 5/4/2016    Pt would like to appoint her son, Kalie Grissom as her medical decision maker in the event that she can no longer do so. Phone number is [phone]. Her secondary person is her next door [employer]. Phone number is [phone]. If her death is imminent and medical treatment will not help her recover, pt does want life prolonging measures.   If her condition makes her unawar    Bilateral leg pain 10/22/2015    Elevated TSH 10/22/2015    Hashimoto's thyroiditis 12/28/2015    Hernia, abdominal     History of benign breast tumor     Menopause     Nicotine dependence in remission 2/4/2016    Obesity, Class I, BMI 30-34.9 2/4/2016    Prediabetes 10/22/2015    Simple chronic bronchitis ([facility] [address] 5/4/2016    Skin lesion 2/4/2016    Varicose vein of leg      Patient Care Team:  Braeden Singh NP as PCP - General (Nurse Practitioner)  Braeden Singh NP [facility]  Melissa Martinez MD (Gastroenterology)  Srinivasa Carson MD (Dermatology)  Fany Coley NP (Nurse Practitioner)  Armida Ventura MD as Consulting Provider (General Surgery)    Past Surgical History:   Procedure Laterality Date    HX BREAST BIOPSY Right pt in 25s    Milk ducts removed    HX CYST INCISION AND DRAINAGE Right     35 y/o    HX HERNIA REPAIR      abdominal x2    HX HERNIA REPAIR  09/15/2016    ROBOTIC REPAIR OF RECURRENT INCARCERATED HERNIA WITH EXTENSIVE LYSIS OF ADHESIONS WITH PLACEMENT OF MESH     Family History   Problem Relation Age of Onset    Breast Cancer Mother 36        43s    Lung Disease Mother     Elevated Lipids Mother     Stroke Father     Diabetes Paternal Grandfather      Social History     Tobacco Use    Smoking status: Former Smoker     Packs/day: 1.00     Years: 30.00     Pack years: 30.00     Quit date: 1992     Years since quittin.1    Smokeless tobacco: Never Used   Substance Use Topics    Alcohol use: Yes     Alcohol/week: 2.0 standard drinks     Types: 2 Glasses of wine per week     Comment: Occasionally     Drug use: No     No Known Allergies  Current Outpatient Medications on File Prior to Visit   Medication Sig Dispense Refill    multivitamin (ONE A DAY) tablet Take 1 Tab by mouth daily.  budesonide-formoteroL (SYMBICORT) 80-4.5 mcg/actuation HFAA Take 2 Puffs by inhalation two (2) times a day. 3 Inhaler 4    ipratropium (ATROVENT HFA) 17 mcg/actuation inhaler 2 puffs every 8 hours. 3 Inhaler 4    omega-3 fatty acids-vitamin e (FISH OIL) 1,000 mg cap Take 1 capsule by mouth.  calcium-cholecalciferol, D3, (CALTRATE 600+D) tablet Take 1 tablet by mouth daily.  aspirin delayed-release 81 mg tablet Take  by mouth daily.  [DISCONTINUED] ipratropium (ATROVENT HFA) 17 mcg/actuation inhaler Take 1 Puff by inhalation daily as needed.  [DISCONTINUED] levothyroxine (SYNTHROID) 75 mcg tablet Take 1 Tab by mouth Daily (before breakfast). 90 Tab 0     No current facility-administered medications on file prior to visit.       Health Maintenance Due   Topic Date Due    Shingrix Vaccine Age 49> (1 of 2) 1995    GLAUCOMA SCREENING Q2Y  2020         Objective     PHYSICAL EXAMINATION:    Vital Signs: (As obtained by patient/caregiver at home)   Patient-Reported Vitals 2021   Patient-Reported Weight 206   Patient-Reported Height 56   Patient-Reported Pulse 70bpm   Patient-Reported Temperature 98.5 Patient-Reported SpO2 94          General: Alert, cooperative, no distress   Mental  status: Normal mood, behavior, speech, dress, motor activity, and thought processes, able to follow commands   HENT: Normocephalic, atraumatic   Neck: No visualized mass   Resp: No respiratory distress   Neuro: No gross deficits   Skin: No discoloration or lesions of concern on visible areas   Psychiatric: Normal affect, consistent with stated mood, no evidence of hallucinations     Additional exam findings: none      LABS     TESTS      Assessment and Plan     1. Hashimoto's thyroiditis  Scheduled for fasting labs in 2 days. Further plan after results. - levothyroxine (SYNTHROID) 75 mcg tablet; Take 1 Tab by mouth Daily (before breakfast). Dispense: 90 Tab; Refill: 0  - CBC WITH AUTOMATED DIFF; Future  - METABOLIC PANEL, COMPREHENSIVE; Future  - TSH 3RD GENERATION; Future  - T4, FREE; Future    2. Hyperlipidemia with target LDL less than 70  Not currently on medication. Recheck.   - LIPID PANEL; Future    3. COPD with exacerbation ([facility] [address]  Well controlled. Continue current inhalers. 4. Medicare annual wellness visit, subsequent  See documentation of visit below. - CBC WITH AUTOMATED DIFF; Future  - METABOLIC PANEL, COMPREHENSIVE; Future      Follow-up and Dispositions    · Return for labs in 2 days, then medication follow up with new PCP in 6 months. .             712  We discussed the expected course, resolution and complications of the diagnosis(es) in detail. Medication risks, benefits, costs, interactions, and alternatives were discussed as indicated. I advised her to contact the office if her condition worsens, changes or fails to improve as anticipated. She expressed understanding with the diagnosis(es) and plan. Chanel Kraus is a 76 y.o. female who was evaluated by a video visit encounter for concerns as above. Patient identification was verified prior to start of the visit.  A caregiver was present when appropriate. Due to this being a TeleHealth encounter (During DLLUP-87 public health emergency), evaluation of the following organ systems was limited: Vitals/Constitutional/EENT/Resp/CV/GI//MS/Neuro/Skin/Heme-Lymph-Imm. Pursuant to the emergency declaration under the [address] [place] [address] waiver authority and the [employer] and [employer] Act, this Virtual  Visit was conducted, with patient's (and/or legal guardian's) consent, to reduce the patient's risk of exposure to COVID-19 and provide necessary medical care. Services were provided through a video synchronous discussion virtually to substitute for in-person clinic visit. Patient was located at home and provider was located at home. Signed electronically by Balbir Cisneros DNP, FNP-BC    This is a Subsequent Medicare Annual Wellness Visit providing Personalized Prevention Plan Services (PPPS) (Performed 12 months after initial AWV and PPPS )    Consent: Vy Freitas, who was seen by synchronous (real-time) audio-video technology, and/or her healthcare decision maker, is aware that this patient-initiated, Telehealth encounter on 2/24/2021 is a billable service, with coverage as determined by her insurance carrier. She is aware that she may receive a bill and has provided verbal consent to proceed: Yes. I have reviewed the patient's medical history in detail and updated the computerized patient record. Vy Freitas is a 76 y.o. female and presents for an subsequent annual wellness exam     History     Past Medical History:   Diagnosis Date    Acute bronchitis 12/28/2015    Advance directive discussed with patient 5/4/2016    Pt would like to appoint her son, Ada Birmingham as her medical decision maker in the event that she can no longer do so. Phone number is [phone].   Her secondary person is her next door [facility], [facility]. Phone number is [phone]. If her death is imminent and medical treatment will not help her recover, pt does want life prolonging measures. If her condition makes her unawar    Bilateral leg pain 10/22/2015    Elevated TSH 10/22/2015    Hashimoto's thyroiditis 12/28/2015    Hernia, abdominal     History of benign breast tumor     Menopause     Nicotine dependence in remission 2/4/2016    Obesity, Class I, BMI 30-34.9 2/4/2016    Prediabetes 10/22/2015    Simple chronic bronchitis ([address] 5/4/2016    Skin lesion 2/4/2016    Varicose vein of leg       Past Surgical History:   Procedure Laterality Date    HX BREAST BIOPSY Right pt in 20s    Milk ducts removed    HX CYST INCISION AND DRAINAGE Right     35 y/o    HX HERNIA REPAIR      abdominal x2    HX HERNIA REPAIR  09/15/2016    ROBOTIC REPAIR OF RECURRENT INCARCERATED HERNIA WITH EXTENSIVE LYSIS OF ADHESIONS WITH PLACEMENT OF MESH     Current Outpatient Medications   Medication Sig Dispense Refill    multivitamin (ONE A DAY) tablet Take 1 Tab by mouth daily.  levothyroxine (SYNTHROID) 75 mcg tablet Take 1 Tab by mouth Daily (before breakfast). 90 Tab 0    budesonide-formoteroL (SYMBICORT) 80-4.5 mcg/actuation HFAA Take 2 Puffs by inhalation two (2) times a day. 3 Inhaler 4    ipratropium (ATROVENT HFA) 17 mcg/actuation inhaler 2 puffs every 8 hours. 3 Inhaler 4    omega-3 fatty acids-vitamin e (FISH OIL) 1,000 mg cap Take 1 capsule by mouth.  calcium-cholecalciferol, D3, (CALTRATE 600+D) tablet Take 1 tablet by mouth daily.  aspirin delayed-release 81 mg tablet Take  by mouth daily.        No Known Allergies  Family History   Problem Relation Age of Onset    Breast Cancer Mother 36        43s    Lung Disease Mother     Elevated Lipids Mother     Stroke Father     Diabetes Paternal Grandfather      Social History     Tobacco Use    Smoking status: Former Smoker     Packs/day: 1.00     Years: 30.00     Pack years: 30.00     Quit date: 1992     Years since quittin.1    Smokeless tobacco: Never Used   Substance Use Topics    Alcohol use: Yes     Alcohol/week: 2.0 standard drinks     Types: 2 Glasses of wine per week     Comment: Occasionally      Patient Active Problem List   Diagnosis Code    Hyperlipidemia with target LDL less than 70 E78.5    Hashimoto's thyroiditis E06.3    Nicotine dependence in remission F17.201    Obesity, Class I, BMI 30-34.9 E66.9    Advance directive discussed with patient Z70.80    COPD with exacerbation ([address] J44.1    Influenza J11.1    Acute bronchitis J20.9    Acute hypoxemic respiratory failure ([address] J96.01    Hypothyroid E03.9     Patient Care Team:  Cristopher Kumar NP as PCP - General (Nurse Practitioner)  Cristopher Kumar NP as PCP - [facility] Empaneled Provider  Chris Hagen MD (Gastroenterology)  Masha Jett MD (Dermatology)  Douglas Thrasher NP (Nurse Practitioner)  Kristine Lomas MD as Consulting Provider (General Surgery)    Health Maintenance  Health Maintenance Due   Topic Date Due    Shingrix Vaccine Age 49> (1 of 2) 1995    GLAUCOMA SCREENING Q2Y  2020       Health Maintenance reviewed and patient notified of tests/vaccines that are due. Health Care Directive or Living Will: yes    Depression Risk Factor Screening:      3 most recent PHQ Screens 2021   Little interest or pleasure in doing things Not at all   Feeling down, depressed, irritable, or hopeless Not at all   Total Score PHQ 2 0       Alcohol Risk Factor Screening:      reports current alcohol use of about 2.0 standard drinks of alcohol per week. Women: On any occasion during the past 3 months, have you had more than 3 drinks containing alcohol? NO   Do you average more than 7 drinks per week? NO      Functional Ability and Level of Safety:     Hearing Loss    Hearing is good.     Activities of Daily Living     ADL Assessment 2021   Feeding yourself No Help Needed Getting from bed to chair No Help Needed   Getting dressed No Help Needed   Bathing or showering No Help Needed   Walk across the room (includes cane/walker) No Help Needed   Using the telphone No Help Needed   Taking your medications No Help Needed   Preparing meals No Help Needed   Managing money (expenses/bills) No Help Needed   Moderately strenuous housework (laundry) No Help Needed   Shopping for personal items (toiletries/medicines) No Help Needed   Shopping for groceries No Help Needed   Driving No Help Needed   Climbing a flight of stairs No Help Needed   Getting to places beyond walking distances No Help Needed         Fall Risk     Fall Risk Assessment, last 12 mths 2/24/2021   Able to walk? Yes   Fall in past 12 months? 0   Do you feel unsteady? 0   Are you worried about falling 0         Abuse Screen     Abuse Screening Questionnaire 2/24/2021   Do you ever feel afraid of your partner? N   Are you in a relationship with someone who physically or mentally threatens you? N   Is it safe for you to go home? Y         Examination   Physical Examination  There were no vitals filed for this visit. There is no height or weight on file to calculate BMI. Evaluation of Cognitive Function:  Mood/affect/memory: normal  Family member/caregiver input: no concerns    Advice/Referrals/Counseling/Plan:   Education and counseling provided:  Are appropriate based on today's review and evaluation  Include in education list (weight loss, physical activity, smoking cessation, fall prevention, and nutrition)    Next Medicare Annual Wellness Exam will be due in 12 months. See my separate note for any problems and/or changes to treatment plan discussed during this visit. Signed electronically by Sriram Jackson, ROOPA, FNP-BC.

## 2021-02-26 ENCOUNTER — HOSPITAL ENCOUNTER (OUTPATIENT)
Dept: LAB | Age: 76
Discharge: HOME | End: 2021-02-26
Payer: MEDICARE

## 2021-02-26 ENCOUNTER — APPOINTMENT (OUTPATIENT)
Dept: FAMILY MEDICINE CLINIC | Age: 76
End: 2021-02-26

## 2021-02-26 DIAGNOSIS — Z00.00 MEDICARE ANNUAL WELLNESS VISIT, SUBSEQUENT: ICD-10-CM

## 2021-02-26 DIAGNOSIS — E06.3 HASHIMOTO'S THYROIDITIS: ICD-10-CM

## 2021-02-26 DIAGNOSIS — J41.0 SIMPLE CHRONIC BRONCHITIS (HCC): ICD-10-CM

## 2021-02-26 DIAGNOSIS — E78.5 HYPERLIPIDEMIA WITH TARGET LDL LESS THAN 70: ICD-10-CM

## 2021-02-26 LAB
ALBUMIN SERPL-MCNC: 3.5 G/DL (ref 3.4–5)
ALBUMIN/GLOB SERPL: 0.8 {RATIO} (ref 0.8–1.7)
ALP SERPL-CCNC: 90 U/L (ref 45–117)
ALT SERPL-CCNC: 18 U/L (ref 13–56)
ANION GAP SERPL CALC-SCNC: 4 MMOL/L (ref 3–18)
AST SERPL-CCNC: 15 U/L (ref 10–38)
BASOPHILS # BLD: 0 K/UL (ref 0–0.1)
BASOPHILS NFR BLD: 0 % (ref 0–2)
BILIRUB SERPL-MCNC: 0.4 MG/DL (ref 0.2–1)
BUN SERPL-MCNC: 19 MG/DL (ref 7–18)
BUN/CREAT SERPL: 19 (ref 12–20)
CALCIUM SERPL-MCNC: 8.7 MG/DL (ref 8.5–10.1)
CHLORIDE SERPL-SCNC: 106 MMOL/L (ref 100–111)
CHOLEST SERPL-MCNC: 215 MG/DL
CO2 SERPL-SCNC: 31 MMOL/L (ref 21–32)
CREAT SERPL-MCNC: 1 MG/DL (ref 0.6–1.3)
DIFFERENTIAL METHOD BLD: NORMAL
EOSINOPHIL # BLD: 0.1 K/UL (ref 0–0.4)
EOSINOPHIL NFR BLD: 1 % (ref 0–5)
ERYTHROCYTE [DISTWIDTH] IN BLOOD BY AUTOMATED COUNT: 13.5 % (ref 11.6–14.5)
GLOBULIN SER CALC-MCNC: 4.3 G/DL (ref 2–4)
GLUCOSE SERPL-MCNC: 72 MG/DL (ref 74–99)
HCT VFR BLD AUTO: 43.1 % (ref 35–45)
HDLC SERPL-MCNC: 67 MG/DL (ref 40–60)
HDLC SERPL: 3.2 {RATIO} (ref 0–5)
HGB BLD-MCNC: 14.2 G/DL (ref 12–16)
LDLC SERPL CALC-MCNC: 133.6 MG/DL (ref 0–100)
LIPID PROFILE,FLP: ABNORMAL
LYMPHOCYTES # BLD: 1.3 K/UL (ref 0.9–3.6)
LYMPHOCYTES NFR BLD: 25 % (ref 21–52)
MCH RBC QN AUTO: 31.3 PG (ref 24–34)
MCHC RBC AUTO-ENTMCNC: 32.9 G/DL (ref 31–37)
MCV RBC AUTO: 95.1 FL (ref 74–97)
MONOCYTES # BLD: 0.5 K/UL (ref 0.05–1.2)
MONOCYTES NFR BLD: 9 % (ref 3–10)
NEUTS SEG # BLD: 3.4 K/UL (ref 1.8–8)
NEUTS SEG NFR BLD: 65 % (ref 40–73)
PLATELET # BLD AUTO: 242 K/UL (ref 135–420)
PMV BLD AUTO: 10.7 FL (ref 9.2–11.8)
POTASSIUM SERPL-SCNC: 4.4 MMOL/L (ref 3.5–5.5)
PROT SERPL-MCNC: 7.8 G/DL (ref 6.4–8.2)
RBC # BLD AUTO: 4.53 M/UL (ref 4.2–5.3)
SODIUM SERPL-SCNC: 141 MMOL/L (ref 136–145)
T4 FREE SERPL-MCNC: 1.2 NG/DL (ref 0.7–1.5)
TRIGL SERPL-MCNC: 72 MG/DL (ref ?–150)
TSH SERPL DL<=0.05 MIU/L-ACNC: 3.63 UIU/ML (ref 0.36–3.74)
VLDLC SERPL CALC-MCNC: 14.4 MG/DL
WBC # BLD AUTO: 5.2 K/UL (ref 4.6–13.2)

## 2021-02-26 PROCEDURE — 85025 COMPLETE CBC W/AUTO DIFF WBC: CPT

## 2021-02-26 PROCEDURE — 84443 ASSAY THYROID STIM HORMONE: CPT

## 2021-02-26 PROCEDURE — 36415 COLL VENOUS BLD VENIPUNCTURE: CPT

## 2021-02-26 PROCEDURE — 80061 LIPID PANEL: CPT

## 2021-02-26 PROCEDURE — 84439 ASSAY OF FREE THYROXINE: CPT

## 2021-02-26 PROCEDURE — 80053 COMPREHEN METABOLIC PANEL: CPT

## 2021-03-05 ENCOUNTER — TELEPHONE (OUTPATIENT)
Dept: FAMILY MEDICINE CLINIC | Age: 76
End: 2021-03-05

## 2021-03-05 NOTE — TELEPHONE ENCOUNTER
Patient has picked up medication from the  pharmacy, patient would like to have a 90 day supply with refills of the  ipratropium (Atrovent HFA) 17 mcg/actuation inhaler,   due the cost. Patient states that it is cheaper for her with 90 day supply

## 2021-03-09 DIAGNOSIS — J41.0 SIMPLE CHRONIC BRONCHITIS (HCC): ICD-10-CM

## 2021-03-11 DIAGNOSIS — E06.3 HASHIMOTO'S THYROIDITIS: ICD-10-CM

## 2021-03-11 DIAGNOSIS — J44.1 COPD WITH EXACERBATION (HCC): Primary | ICD-10-CM

## 2021-03-11 RX ORDER — ALBUTEROL SULFATE 90 UG/1
2 AEROSOL, METERED RESPIRATORY (INHALATION)
Qty: 3 INHALER | Refills: 1 | Status: SHIPPED | OUTPATIENT
Start: 2021-03-11 | End: 2022-07-20 | Stop reason: SDUPTHER

## 2021-03-11 RX ORDER — LEVOTHYROXINE SODIUM 75 UG/1
75 TABLET ORAL
Qty: 90 TAB | Refills: 1 | Status: SHIPPED | OUTPATIENT
Start: 2021-03-11 | End: 2021-09-21 | Stop reason: SDUPTHER

## 2021-03-12 DIAGNOSIS — J44.1 COPD WITH EXACERBATION (HCC): ICD-10-CM

## 2021-03-12 RX ORDER — BUDESONIDE AND FORMOTEROL FUMARATE DIHYDRATE 80; 4.5 UG/1; UG/1
2 AEROSOL RESPIRATORY (INHALATION) 2 TIMES DAILY
Qty: 3 INHALER | Refills: 4 | Status: SHIPPED | OUTPATIENT
Start: 2021-03-12 | End: 2021-08-12 | Stop reason: SDUPTHER

## 2021-08-12 DIAGNOSIS — J44.1 COPD WITH EXACERBATION (HCC): ICD-10-CM

## 2021-08-12 NOTE — TELEPHONE ENCOUNTER
Requested Prescriptions     Pending Prescriptions Disp Refills    tiotropium bromide (Spiriva Respimat) 2.5 mcg/actuation inhaler 3 Inhaler 1     Sig: Take 2 Puffs by inhalation daily.  budesonide-formoteroL (SYMBICORT) 80-4.5 mcg/actuation HFAA 3 Inhaler 4     Sig: Take 2 Puffs by inhalation two (2) times a day.

## 2021-08-14 RX ORDER — BUDESONIDE AND FORMOTEROL FUMARATE DIHYDRATE 80; 4.5 UG/1; UG/1
2 AEROSOL RESPIRATORY (INHALATION) 2 TIMES DAILY
Qty: 3 INHALER | Refills: 4 | Status: SHIPPED | OUTPATIENT
Start: 2021-08-14 | End: 2021-09-21 | Stop reason: ALTCHOICE

## 2021-09-21 ENCOUNTER — HOSPITAL ENCOUNTER (OUTPATIENT)
Dept: WOMENS IMAGING | Age: 76
Discharge: HOME | End: 2021-09-21
Attending: FAMILY MEDICINE
Payer: MEDICARE

## 2021-09-21 ENCOUNTER — OFFICE VISIT (OUTPATIENT)
Dept: FAMILY MEDICINE CLINIC | Age: 76
End: 2021-09-21
Payer: MEDICARE

## 2021-09-21 ENCOUNTER — HOSPITAL ENCOUNTER (OUTPATIENT)
Dept: LAB | Age: 76
Discharge: HOME | End: 2021-09-21
Payer: MEDICARE

## 2021-09-21 VITALS
RESPIRATION RATE: 16 BRPM | TEMPERATURE: 97.9 F | BODY MASS INDEX: 30.31 KG/M2 | DIASTOLIC BLOOD PRESSURE: 70 MMHG | HEIGHT: 68 IN | SYSTOLIC BLOOD PRESSURE: 120 MMHG | HEART RATE: 82 BPM | WEIGHT: 200 LBS | OXYGEN SATURATION: 92 %

## 2021-09-21 DIAGNOSIS — Z12.31 VISIT FOR SCREENING MAMMOGRAM: ICD-10-CM

## 2021-09-21 DIAGNOSIS — Z23 NEEDS FLU SHOT: ICD-10-CM

## 2021-09-21 DIAGNOSIS — R73.9 HYPERGLYCEMIA: ICD-10-CM

## 2021-09-21 DIAGNOSIS — E06.3 HASHIMOTO'S THYROIDITIS: ICD-10-CM

## 2021-09-21 DIAGNOSIS — J41.0 SIMPLE CHRONIC BRONCHITIS (HCC): Primary | ICD-10-CM

## 2021-09-21 DIAGNOSIS — E78.5 HYPERLIPIDEMIA WITH TARGET LDL LESS THAN 70: ICD-10-CM

## 2021-09-21 LAB
CHOLEST SERPL-MCNC: 239 MG/DL
EST. AVERAGE GLUCOSE BLD GHB EST-MCNC: 111 MG/DL
HBA1C MFR BLD: 5.5 % (ref 4.2–5.6)
HDLC SERPL-MCNC: 62 MG/DL (ref 40–60)
HDLC SERPL: 3.9 {RATIO} (ref 0–5)
LDLC SERPL CALC-MCNC: 157.6 MG/DL (ref 0–100)
LIPID PROFILE,FLP: ABNORMAL
TRIGL SERPL-MCNC: 97 MG/DL (ref ?–150)
TSH SERPL DL<=0.05 MIU/L-ACNC: 1.92 UIU/ML (ref 0.36–3.74)
VLDLC SERPL CALC-MCNC: 19.4 MG/DL

## 2021-09-21 PROCEDURE — 80061 LIPID PANEL: CPT

## 2021-09-21 PROCEDURE — 84443 ASSAY THYROID STIM HORMONE: CPT

## 2021-09-21 PROCEDURE — G8536 NO DOC ELDER MAL SCRN: HCPCS | Performed by: FAMILY MEDICINE

## 2021-09-21 PROCEDURE — G8510 SCR DEP NEG, NO PLAN REQD: HCPCS | Performed by: FAMILY MEDICINE

## 2021-09-21 PROCEDURE — 90694 VACC AIIV4 NO PRSRV 0.5ML IM: CPT | Performed by: FAMILY MEDICINE

## 2021-09-21 PROCEDURE — G0008 ADMIN INFLUENZA VIRUS VAC: HCPCS | Performed by: FAMILY MEDICINE

## 2021-09-21 PROCEDURE — 1090F PRES/ABSN URINE INCON ASSESS: CPT | Performed by: FAMILY MEDICINE

## 2021-09-21 PROCEDURE — 1101F PT FALLS ASSESS-DOCD LE1/YR: CPT | Performed by: FAMILY MEDICINE

## 2021-09-21 PROCEDURE — G8417 CALC BMI ABV UP PARAM F/U: HCPCS | Performed by: FAMILY MEDICINE

## 2021-09-21 PROCEDURE — 83036 HEMOGLOBIN GLYCOSYLATED A1C: CPT

## 2021-09-21 PROCEDURE — G8399 PT W/DXA RESULTS DOCUMENT: HCPCS | Performed by: FAMILY MEDICINE

## 2021-09-21 PROCEDURE — 36415 COLL VENOUS BLD VENIPUNCTURE: CPT

## 2021-09-21 PROCEDURE — G8427 DOCREV CUR MEDS BY ELIG CLIN: HCPCS | Performed by: FAMILY MEDICINE

## 2021-09-21 PROCEDURE — 99214 OFFICE O/P EST MOD 30 MIN: CPT | Performed by: FAMILY MEDICINE

## 2021-09-21 PROCEDURE — 77063 BREAST TOMOSYNTHESIS BI: CPT

## 2021-09-21 RX ORDER — BUDESONIDE, GLYCOPYRROLATE, AND FORMOTEROL FUMARATE 160; 9; 4.8 UG/1; UG/1; UG/1
2 AEROSOL, METERED RESPIRATORY (INHALATION) 2 TIMES DAILY
Qty: 1 EACH | Refills: 12 | Status: SHIPPED | OUTPATIENT
Start: 2021-09-21 | End: 2022-07-20 | Stop reason: SDUPTHER

## 2021-09-21 RX ORDER — LEVOTHYROXINE SODIUM 75 UG/1
75 TABLET ORAL
Qty: 90 TABLET | Refills: 4 | Status: SHIPPED | OUTPATIENT
Start: 2021-09-21 | End: 2022-10-24

## 2021-09-21 RX ORDER — GLUCOSAMINE/CHONDR SU A SOD 750-600 MG
1 TABLET ORAL DAILY
COMMUNITY

## 2021-09-21 NOTE — PROGRESS NOTES
Peter Rebolledo is a 68 y.o.  female and presents with    Chief Complaint   Patient presents with    Thyroid Problem    COPD     Subjective:  She is presenting to establish care with new PCP. She has hypothyroid and copd. She is requesting flu vaccine; she has received covid vaccine X 3. COPD Review:   The patient is being seen for follow up of COPD. Oxygen: She currently is not on home oxygen therapy. Symptoms: becomes dyspneic after several blocks. Patient uses 2 pillows at night. Patient does not smoke cigarettes after quitting 30 years ago. Thyroid Review:  Patient is seen for followup of hypothyroidism. Thyroid ROS: denies fatigue, weight changes, heat/cold intolerance, bowel/skin changes or CVS symptoms. ROS   General ROS: negative for - chills, fatigue or fever  Psychological ROS: negative for - anxiety or depression  Ophthalmic ROS: negative for - blurry vision  ENT ROS: negative for - headaches, nasal congestion or sore throat  Endocrine ROS: negative for - polydipsia/polyuria  Respiratory ROS: no cough, shortness of breath, or wheezing  Cardiovascular ROS: no chest pain or dyspnea on exertion  Gastrointestinal ROS: no abdominal pain, change in bowel habits, or black or bloody stools  Genito-Urinary ROS: no dysuria, trouble voiding, or hematuria  Musculoskeletal ROS: negative for - joint pain or muscle pain  Neurological ROS: negative for - numbness/tingling or weakness  Dermatological ROS: negative for - rash or skin lesion changes    All other systems reviewed and are negative.       Objective:  Vitals:    09/21/21 0918 09/21/21 1038   BP: (!) 143/81 120/70   Pulse: 82    Resp: 16    Temp: 97.9 °F (36.6 °C)    TempSrc: Temporal    SpO2: 92%    Weight: 200 lb (90.7 kg)    Height: 5' 8\" (1.727 m)    PainSc:   0 - No pain        alert, well appearing, and in no distress, oriented to person, place, and time and obese  Mental status - normal mood, behavior, speech, dress, motor activity, and thought processes  Chest - clear to auscultation, no wheezes, rales or rhonchi, symmetric air entry  Heart - normal rate, regular rhythm, normal S1, S2, no murmurs, rubs, clicks or gallops  Neurological - cranial nerves II through XII intact  Skin - varicose veins lower legs    LABS   hgb a1c 5.9  TESTS      Assessment/Plan:    1. Simple chronic bronchitis (HCC)  Recommend combination medication to lighten pharmaceutical burden  - budesonide-glycopyr-formoterol (Breztri Aerosphere) 160-9-4.8 mcg/actuation HFAA; Take 2 Inhalation by inhalation two (2) times a day. Dispense: 1 Each; Refill: 12    2. Hashimoto's thyroiditis  Assess for therapeutic dose  - TSH 3RD GENERATION; Future  - levothyroxine (SYNTHROID) 75 mcg tablet; Take 1 Tablet by mouth Daily (before breakfast). Dispense: 90 Tablet; Refill: 4    3. Hyperlipidemia with target LDL less than 70  Assess for level  - LIPID PANEL; Future    4. Needs flu shot    - INFLUENZA VIRUS VACCINE, HIGH DOSE SEASONAL, PRESERVATIVE FREE  - ADMIN INFLUENZA VIRUS VAC    Lab review: orders written for new lab studies as appropriate; see orders      I have discussed the diagnosis with the patient and the intended plan as seen in the above orders. The patient has received an after-visit summary and questions were answered concerning future plans. I have discussed medication side effects and warnings with the patient as well. I have reviewed the plan of care with the patient, accepted their input and they are in agreement with the treatment goals.

## 2021-09-21 NOTE — PROGRESS NOTES
Tom Freedman presents today for   Chief Complaint   Patient presents with    Thyroid Problem    COPD       Is someone accompanying this pt? no    Is the patient using any DME equipment during OV? no    Depression Screening:  3 most recent PHQ Screens 9/21/2021   Little interest or pleasure in doing things Not at all   Feeling down, depressed, irritable, or hopeless Not at all   Total Score PHQ 2 0       Learning Assessment:  Learning Assessment 9/19/2019   PRIMARY LEARNER Patient   HIGHEST LEVEL OF EDUCATION - PRIMARY LEARNER  GRADUATED HIGH SCHOOL OR GED   BARRIERS PRIMARY LEARNER NONE   CO-LEARNER CAREGIVER No   PRIMARY LANGUAGE ENGLISH   LEARNER PREFERENCE PRIMARY LISTENING   ANSWERED BY patient   RELATIONSHIP SELF       Abuse Screening:  Abuse Screening Questionnaire 2/24/2021   Do you ever feel afraid of your partner? N   Are you in a relationship with someone who physically or mentally threatens you? N   Is it safe for you to go home? Y       Fall Risk  Fall Risk Assessment, last 12 mths 9/21/2021   Able to walk? Yes   Fall in past 12 months? 0   Do you feel unsteady? 0   Are you worried about falling 0       Health Maintenance reviewed and discussed and ordered per Provider. Health Maintenance Due   Topic Date Due    Shingrix Vaccine Age 49> (1 of 2) Never done    Flu Vaccine (1) 09/01/2021   . Coordination of Care:  1. Have you been to the ER, urgent care clinic since your last visit? Hospitalized since your last visit? no    2. Have you seen or consulted any other health care providers outside of the [address] [place] [address] since your last visit? Include any pap smears or colon screening. no      Last  Checked na  Last UDS Checked na  Last Pain contract signed: na    Patient presents in office today for routine care.   Patient concerns: med refills

## 2021-11-13 ENCOUNTER — PATIENT MESSAGE (OUTPATIENT)
Dept: FAMILY MEDICINE CLINIC | Age: 76
End: 2021-11-13

## 2022-02-22 ENCOUNTER — HOSPITAL ENCOUNTER (OUTPATIENT)
Dept: LAB | Age: 77
Discharge: HOME | End: 2022-02-22
Payer: MEDICARE

## 2022-02-22 ENCOUNTER — OFFICE VISIT (OUTPATIENT)
Dept: FAMILY MEDICINE CLINIC | Age: 77
End: 2022-02-22
Payer: MEDICARE

## 2022-02-22 VITALS
BODY MASS INDEX: 30.31 KG/M2 | RESPIRATION RATE: 16 BRPM | HEIGHT: 68 IN | DIASTOLIC BLOOD PRESSURE: 79 MMHG | HEART RATE: 72 BPM | SYSTOLIC BLOOD PRESSURE: 132 MMHG | OXYGEN SATURATION: 94 % | WEIGHT: 200 LBS | TEMPERATURE: 97.9 F

## 2022-02-22 DIAGNOSIS — E78.5 HYPERLIPIDEMIA WITH TARGET LDL LESS THAN 70: ICD-10-CM

## 2022-02-22 DIAGNOSIS — J41.0 SIMPLE CHRONIC BRONCHITIS (HCC): ICD-10-CM

## 2022-02-22 DIAGNOSIS — E78.5 HYPERLIPIDEMIA WITH TARGET LDL LESS THAN 70: Primary | ICD-10-CM

## 2022-02-22 DIAGNOSIS — E06.3 HASHIMOTO'S THYROIDITIS: ICD-10-CM

## 2022-02-22 DIAGNOSIS — Z00.00 MEDICARE ANNUAL WELLNESS VISIT, SUBSEQUENT: ICD-10-CM

## 2022-02-22 DIAGNOSIS — Z71.89 ADVANCE CARE PLANNING: ICD-10-CM

## 2022-02-22 LAB
CHOLEST SERPL-MCNC: 222 MG/DL
HDLC SERPL-MCNC: 71 MG/DL (ref 40–60)
HDLC SERPL: 3.1 {RATIO} (ref 0–5)
LDLC SERPL CALC-MCNC: 139.8 MG/DL (ref 0–100)
LIPID PROFILE,FLP: ABNORMAL
TRIGL SERPL-MCNC: 56 MG/DL (ref ?–150)
VLDLC SERPL CALC-MCNC: 11.2 MG/DL

## 2022-02-22 PROCEDURE — G8432 DEP SCR NOT DOC, RNG: HCPCS | Performed by: FAMILY MEDICINE

## 2022-02-22 PROCEDURE — G8427 DOCREV CUR MEDS BY ELIG CLIN: HCPCS | Performed by: FAMILY MEDICINE

## 2022-02-22 PROCEDURE — G0439 PPPS, SUBSEQ VISIT: HCPCS | Performed by: FAMILY MEDICINE

## 2022-02-22 PROCEDURE — G8417 CALC BMI ABV UP PARAM F/U: HCPCS | Performed by: FAMILY MEDICINE

## 2022-02-22 PROCEDURE — 1101F PT FALLS ASSESS-DOCD LE1/YR: CPT | Performed by: FAMILY MEDICINE

## 2022-02-22 PROCEDURE — 99214 OFFICE O/P EST MOD 30 MIN: CPT | Performed by: FAMILY MEDICINE

## 2022-02-22 PROCEDURE — G8536 NO DOC ELDER MAL SCRN: HCPCS | Performed by: FAMILY MEDICINE

## 2022-02-22 PROCEDURE — 36415 COLL VENOUS BLD VENIPUNCTURE: CPT

## 2022-02-22 PROCEDURE — G8399 PT W/DXA RESULTS DOCUMENT: HCPCS | Performed by: FAMILY MEDICINE

## 2022-02-22 PROCEDURE — 99497 ADVNCD CARE PLAN 30 MIN: CPT | Performed by: FAMILY MEDICINE

## 2022-02-22 PROCEDURE — 80061 LIPID PANEL: CPT

## 2022-02-22 PROCEDURE — 1090F PRES/ABSN URINE INCON ASSESS: CPT | Performed by: FAMILY MEDICINE

## 2022-02-22 NOTE — ACP (ADVANCE CARE PLANNING)
Advance Care Planning     Advance Care Planning (ACP) Physician/NP/PA Conversation      Date of Conversation: 2/22/2022  Conducted with: Patient with Decision Making Capacity    Healthcare Decision Maker:     Primary Decision Maker: Luis Miguel Dale - [phone]    Secondary Decision Maker: Robert Guardado - [phone]  Click here to complete [address] including selection of the Healthcare Decision Maker Relationship (ie \"Primary\")      Today we documented Decision Maker(s) consistent with Legal Next of Kin hierarchy. Care Preferences:    Hospitalization: \"If your health worsens and it becomes clear that your chance of recovery is unlikely, what would be your preference regarding hospitalization? \"  The patient would prefer hospitalization. Ventilation: \"If you were unable to breathe on your own and your chance of recovery was unlikely, what would be your preference about the use of a ventilator (breathing machine) if it was available to you? \"   The patient would desire the use of a ventilator. Resuscitation: \"In the event your heart stopped as a result of an underlying serious health condition, would you want attempts to be made to restart your heart, or would you prefer a natural death? \"   Yes, attempt to resuscitate.     Additional topics discussed: treatment goals, benefit/burden of treatment options, ventilation preferences, hospitalization preferences and resuscitation preferences    Conversation Outcomes / Follow-Up Plan:   ACP complete - no further action today  Reviewed DNR/DNI and patient elects Full Code (Attempt Resuscitation)     Length of Voluntary ACP Conversation in minutes:  16 minutes    Orly Dale MD

## 2022-02-22 NOTE — PROGRESS NOTES
Vivi Phillips presents today for   Chief Complaint   Patient presents with    Annual Wellness Visit       Is someone accompanying this pt? no    Is the patient using any DME equipment during OV? no    Depression Screening:  3 most recent PHQ Screens 2/22/2022   Little interest or pleasure in doing things Not at all   Feeling down, depressed, irritable, or hopeless Not at all   Total Score PHQ 2 0       Learning Assessment:  Learning Assessment 9/19/2019   PRIMARY LEARNER Patient   HIGHEST LEVEL OF EDUCATION - PRIMARY LEARNER  GRADUATED HIGH SCHOOL OR GED   BARRIERS PRIMARY LEARNER NONE   CO-LEARNER CAREGIVER No   PRIMARY LANGUAGE ENGLISH   LEARNER PREFERENCE PRIMARY LISTENING   ANSWERED BY patient   RELATIONSHIP SELF       Abuse Screening:  Abuse Screening Questionnaire 2/24/2021   Do you ever feel afraid of your partner? N   Are you in a relationship with someone who physically or mentally threatens you? N   Is it safe for you to go home? Y       Fall Risk  Fall Risk Assessment, last 12 mths 2/22/2022   Able to walk? Yes   Fall in past 12 months? 0   Do you feel unsteady? 0   Are you worried about falling 0       Health Maintenance reviewed and discussed and ordered per Provider. Health Maintenance Due   Topic Date Due    Shingrix Vaccine Age 49> (1 of 2) Never done    Medicare Yearly Exam  02/25/2022   . Coordination of Care:  1. Have you been to the ER, urgent care clinic since your last visit? Hospitalized since your last visit? no    2. Have you seen or consulted any other health care providers outside of the [address] [place] [address] since your last visit? Include any pap smears or colon screening.  no      Last  Checked na  Last UDS Checked na  Last Pain contract signed: na    Annual Medicare Wellness Exam  Med refills

## 2022-02-22 NOTE — PROGRESS NOTES
(AWV) The Medicare Annual Wellness Exam PROGRESS NOTE    This is a Medicare Annual Wellness Exam (AWV)    I have reviewed the patient's medical history in detail and updated the computerized patient record. Nimo Wood is a 68 y.o.  female and presents for an annual wellness exam     ROS   General ROS: negative for - chills, fatigue or fever  Psychological ROS: negative for - anxiety or depression  Ophthalmic ROS: negative for - blurry vision  ENT ROS: negative for - nasal congestion or sore throat  Endocrine ROS: negative for - polydipsia/polyuria or temperature intolerance  Respiratory ROS: diagnosed with copd  Cardiovascular ROS: no chest pain; + dyspnea on exertion when carrying groceries up stairs. Gastrointestinal ROS: no abdominal pain, change in bowel habits, or black or bloody stools  Genito-Urinary ROS: no dysuria, trouble voiding, or hematuria  Musculoskeletal ROS: negative for - joint pain or muscle pain  Neurological ROS: negative for - numbness/tingling or weakness  Dermatological ROS: negative for - rash or skin lesion changes    All other systems reviewed and are negative. History     Past Medical History:   Diagnosis Date    Acute bronchitis 12/28/2015    Advance directive discussed with patient 5/4/2016    Pt would like to appoint her son, Mariel Rdz as her medical decision maker in the event that she can no longer do so. Phone number is [phone]. Her secondary person is her [email or web address] Phone number is [phone]. If her death is imminent and medical treatment will not help her recover, pt does want life prolonging measures.   If her condition makes her unawar    Bilateral leg pain 10/22/2015    Elevated TSH 10/22/2015    Hashimoto's thyroiditis 12/28/2015    Hernia, abdominal     History of benign breast tumor     Menopause     Nicotine dependence in remission 2/4/2016    Obesity, Class I, BMI 30-34.9 2/4/2016    Prediabetes 10/22/2015    Simple chronic bronchitis (HCC) 2016    Skin lesion 2016    Varicose vein of leg       Past Surgical History:   Procedure Laterality Date    HX BREAST BIOPSY Right pt in 20s    Milk ducts removed    HX CYST INCISION AND DRAINAGE Right     35 y/o    HX HERNIA REPAIR      abdominal x2    HX HERNIA REPAIR  09/15/2016    ROBOTIC REPAIR OF RECURRENT INCARCERATED HERNIA WITH EXTENSIVE LYSIS OF ADHESIONS WITH PLACEMENT OF MESH     Current Outpatient Medications   Medication Sig Dispense Refill    levothyroxine (SYNTHROID) 75 mcg tablet Take 1 Tablet by mouth Daily (before breakfast). 90 Tablet 4    budesonide-glycopyr-formoterol (Breztri Aerosphere) 160-9-4.8 mcg/actuation HFAA Take 2 Inhalation by inhalation two (2) times a day. 1 Each 12    glucosamine/chondr frost A sod (glucosamine-chondroitin) 750-600 mg tab Take 1 Tablet by mouth daily.  albuterol (PROVENTIL HFA, VENTOLIN HFA, PROAIR HFA) 90 mcg/actuation inhaler Take 2 Puffs by inhalation every six (6) hours as needed for Wheezing or Shortness of Breath. 3 Inhaler 1    multivitamin (ONE A DAY) tablet Take 1 Tab by mouth daily.  omega-3 fatty acids-vitamin e (FISH OIL) 1,000 mg cap Take 1 capsule by mouth.  calcium-cholecalciferol, D3, (CALTRATE 600+D) tablet Take 1 tablet by mouth daily. No Known Allergies  Family History   Problem Relation Age of Onset    Breast Cancer Mother 36        43s    Lung Disease Mother     Elevated Lipids Mother     Stroke Father     Diabetes Paternal Grandfather      Social History     Tobacco Use    Smoking status: Former Smoker     Packs/day: 1.00     Years: 30.00     Pack years: 30.00     Types: Cigarettes     Quit date: 1992     Years since quittin.1    Smokeless tobacco: Never Used   Substance Use Topics    Alcohol use:  Yes     Alcohol/week: 2.0 standard drinks     Types: 2 Glasses of wine per week     Comment: Occasionally      Patient Active Problem List   Diagnosis Code    Hyperlipidemia with target LDL less than 70 E78.5    Hashimoto's thyroiditis E06.3    Nicotine dependence in remission F17.201    Obesity, Class I, BMI 30-34.9 E66.9    Advance directive discussed with patient Z70.80    COPD with exacerbation ([address] J44.1    Influenza J11.1    Acute bronchitis J20.9    Acute hypoxemic respiratory failure ([facility] J96.01    Hypothyroid E03.9       Health Maintenance History  Immunizations reviewed, dtap up to date, pneumovax up to date, flu up to date , zoster due but she had the infection 18 years ago  Colonoscopy: up to date,   Eye exam: up to date  Mammo up to date  Dexascan up to date        Depression Risk Factor Screening:      Patient Health Questionnaire (PHQ-2)   Over the last 2 weeks, how often have you been bothered by any of the following problems? · Little interest or pleasure in doing things? · Not at all. [0]  · Feeling down, depressed, or hopeless? · Not at all. [0]    Total Score: 0/6  PHQ-2 Assessment Scoring:   A score of 2 or more requires further screening with the PHQ-9    Alcohol Risk Factor Screening:     Women: On any occasion during the past 3 months, have you had more than 3 drinks containing alcohol?  no   Do you average more than 7 drinks per week?  no    Functional Ability and Level of Safety:     Hearing Loss    The patient needs further evaluation. Activities of Daily Living   Self-care. Requires assistance with: no ADLs    Fall Risk   No fall risk factors    Abuse Screen   Patient is not abused    Examination   Physical Examination  Vitals:    02/22/22 1016   BP: 132/79   Pulse: 72   Resp: 16   Temp: 97.9 °F (36.6 °C)   TempSrc: Temporal   SpO2: 94%   Weight: 200 lb (90.7 kg)   Height: 5' 8\" (1.727 m)   PainSc:   0 - No pain      Body mass index is 30.41 kg/m².      Evaluation of Cognitive Function:  Mood/affect:good mood with appropriate affect  Appearance: well kempt  Family member/caregiver input: n/a    alert, well appearing, and in no distress, oriented to person, place, and time and overweight    Patient Care Team:  Nalini Smalls MD as PCP - General (Family Medicine)  Nalini Smalls MD as PCP - [facility] Provider  Liz Castillo MD (Gastroenterology)  Chaitanya Degroot MD (Dermatology)  Franc Nicole NP (Nurse Practitioner)  Dagmar Suárez MD as Consulting Provider (General Surgery)    End-of-life planning  Advanced Directive in the case than an injury or illness causes the patient to be unable to make health care decisions    Health Care Directive or Living Will: yes    Advice/Referrals/Counselling/Plan:   Education and counseling provided:  End-of-Life planning (with patient's consent)  Cardiovascular screening blood test  Diabetes screening test  Include in education list (weight loss, physical activity, smoking cessation, fall prevention, and nutrition)    ICD-10-CM ICD-9-CM    1. Hyperlipidemia with target LDL less than 70  E78.5 272.4 LIPID PANEL   2. Simple chronic bronchitis (HCC)  J41.0 491.0    3. Hashimoto's thyroiditis  E06.3 245.2    . Brief written plan, checklist    I have discussed the diagnosis with the patient and the intended plan as seen in the above orders. The patient has received an after-visit summary and questions were answered concerning future plans. I have discussed medication side effects and warnings with the patient as well. I have reviewed the plan of care with the patient, accepted their input and they are in agreement with the treatment goals. ____________________________________________________________    Problem Assessment    for treatment of   Chief Complaint   Patient presents with    Annual Wellness Visit         SUBJECTIVE    Well Adult Physical   Patient here for a comprehensive physical exam.The patient reports problems - hypothyroid, copd, history of covid  Do you take any herbs or supplements that were not prescribed by a doctor?  yes Are you taking calcium supplements? yes Are you taking aspirin daily? not applicable    COPD Review:   The patient is being seen for follow up of COPD. Oxygen: She currently is not on home oxygen therapy. Symptoms: becomes dyspneic after several blocks. Patient uses 2 pillows at night. Patient does not smoke cigarettes after quitting 30 years ago.     Thyroid Review:  Patient is seen for followup of hypothyroidism. Thyroid ROS: denies fatigue, weight changes, heat/cold intolerance, bowel/skin changes or CVS symptoms. Visit Vitals  /79 (BP 1 Location: Right arm, BP Patient Position: Sitting, BP Cuff Size: Adult)   Pulse 72   Temp 97.9 °F (36.6 °C) (Temporal)   Resp 16   Ht 5' 8\" (1.727 m)   Wt 200 lb (90.7 kg)   SpO2 94%   BMI 30.41 kg/m²     General:  Alert, cooperative, no distress, appears stated age. Head:  Normocephalic, without obvious abnormality, atraumatic. Eyes:  Conjunctivae/corneas clear. PERRL, EOMs intact. Ears:  Normal TMs and external ear canals both ears. Nose: Nares normal. Septum midline. Mucosa normal. No drainage or sinus tenderness. Throat: Lips, mucosa, and tongue normal. Teeth and gums normal.   Neck: Supple, symmetrical, trachea midline, no adenopathy, thyroid: no enlargement/tenderness/nodules and no JVD. Back:   Symmetric, no curvature. ROM normal. No CVA tenderness. Lungs:   Clear to auscultation bilaterally. Chest wall:  No tenderness or deformity. Heart:  Regular rate and rhythm, S1, S2 normal, no murmur, click, rub or gallop. Breast Exam:  Not examined   Abdomen:   Soft, non-tender. Bowel sounds normal. No masses,  No organomegaly. Genitalia:  deferred   Rectal:  deferred   Extremities: Extremities normal, atraumatic, no cyanosis or edema. Pulses: 2+ and symmetric all extremities. Skin: Skin color, texture, turgor normal. No rashes or lesions. Lymph nodes: Cervical, supraclavicular, and axillary nodes normal.   Neurologic: CNII-XII intact. Normal strength, sensation and reflexes throughout. LABS     TESTS      Assessment/Plan:    1. Simple chronic bronchitis ([address]  Continue inhaled therapy    2. Hyperlipidemia with target LDL less than 70  Discussed statin therapy; she does not want to start medication; she is taking fish oil  - LIPID PANEL; Future    3. Hashimoto's thyroiditis  Continue thyroid replacement therapy    4. Medicare annual wellness visit, subsequent  Reviewed preventive recommendations  - [id number] [facility] [employer]    5.  Advance care planning  See ACP  - ADVANCE CARE PLANNING FIRST 30 MINS    Lab review: orders written for new lab studies as appropriate; see orders

## 2022-03-18 PROBLEM — J11.1 INFLUENZA: Status: ACTIVE | Noted: 2018-03-08

## 2022-03-18 PROBLEM — J96.01 ACUTE HYPOXEMIC RESPIRATORY FAILURE (HCC): Status: ACTIVE | Noted: 2020-01-06

## 2022-03-18 PROBLEM — J44.1 COPD WITH EXACERBATION (HCC): Status: ACTIVE | Noted: 2018-03-07

## 2022-03-18 PROBLEM — E03.9 HYPOTHYROID: Status: ACTIVE | Noted: 2020-01-06

## 2022-03-19 PROBLEM — J20.9 ACUTE BRONCHITIS: Status: ACTIVE | Noted: 2018-03-08

## 2022-07-20 DIAGNOSIS — J44.1 COPD WITH EXACERBATION (HCC): ICD-10-CM

## 2022-07-20 DIAGNOSIS — J41.0 SIMPLE CHRONIC BRONCHITIS (HCC): ICD-10-CM

## 2022-07-20 NOTE — TELEPHONE ENCOUNTER
Patient is requesting med refill for the following:    Requested Prescriptions     Pending Prescriptions Disp Refills    budesonide-glycopyr-formoterol (Breztri Aerosphere) 160-9-4.8 mcg/actuation HFAA 1 Each 12     Sig: Take 2 Inhalation by inhalation two (2) times a day. albuterol (PROVENTIL HFA, VENTOLIN HFA, PROAIR HFA) 90 mcg/actuation inhaler 3 Each 1     Sig: Take 2 Puffs by inhalation every six (6) hours as needed for Wheezing or Shortness of Breath. Thank you!

## 2022-07-23 RX ORDER — ALBUTEROL SULFATE 90 UG/1
2 AEROSOL, METERED RESPIRATORY (INHALATION)
Qty: 3 EACH | Refills: 1 | Status: SHIPPED | OUTPATIENT
Start: 2022-07-23

## 2022-07-23 RX ORDER — BUDESONIDE, GLYCOPYRROLATE, AND FORMOTEROL FUMARATE 160; 9; 4.8 UG/1; UG/1; UG/1
2 AEROSOL, METERED RESPIRATORY (INHALATION) 2 TIMES DAILY
Qty: 1 EACH | Refills: 12 | Status: SHIPPED | OUTPATIENT
Start: 2022-07-23

## 2022-09-14 ENCOUNTER — TRANSCRIBE ORDER (OUTPATIENT)
Dept: SCHEDULING | Age: 77
End: 2022-09-14

## 2022-09-14 DIAGNOSIS — Z12.31 VISIT FOR SCREENING MAMMOGRAM: Primary | ICD-10-CM

## 2022-09-22 ENCOUNTER — HOSPITAL ENCOUNTER (OUTPATIENT)
Dept: WOMENS IMAGING | Age: 77
Discharge: HOME | End: 2022-09-22
Attending: FAMILY MEDICINE
Payer: MEDICARE

## 2022-09-22 DIAGNOSIS — Z12.31 VISIT FOR SCREENING MAMMOGRAM: ICD-10-CM

## 2022-09-22 PROCEDURE — 77063 BREAST TOMOSYNTHESIS BI: CPT

## 2022-10-22 DIAGNOSIS — E06.3 HASHIMOTO'S THYROIDITIS: ICD-10-CM

## 2022-10-24 RX ORDER — LEVOTHYROXINE SODIUM 75 UG/1
TABLET ORAL
Qty: 90 TABLET | Refills: 4 | Status: SHIPPED | OUTPATIENT
Start: 2022-10-24

## 2022-12-27 DIAGNOSIS — J44.1 COPD WITH EXACERBATION (HCC): ICD-10-CM

## 2022-12-28 RX ORDER — ALBUTEROL SULFATE 90 UG/1
AEROSOL, METERED RESPIRATORY (INHALATION)
Qty: 2 EACH | Refills: 1 | Status: SHIPPED | OUTPATIENT
Start: 2022-12-28

## 2023-01-28 ENCOUNTER — TRANSCRIBE ORDERS (OUTPATIENT)
Facility: HOSPITAL | Age: 78
End: 2023-01-28

## 2023-01-28 DIAGNOSIS — Z12.31 VISIT FOR SCREENING MAMMOGRAM: Primary | ICD-10-CM

## 2023-03-02 ENCOUNTER — HOSPITAL ENCOUNTER (OUTPATIENT)
Facility: HOSPITAL | Age: 78
Setting detail: SPECIMEN
Discharge: HOME | End: 2023-03-02
Payer: MEDICARE

## 2023-03-02 ENCOUNTER — OFFICE VISIT (OUTPATIENT)
Facility: CLINIC | Age: 78
End: 2023-03-02

## 2023-03-02 VITALS
WEIGHT: 188.8 LBS | HEIGHT: 68 IN | SYSTOLIC BLOOD PRESSURE: 127 MMHG | RESPIRATION RATE: 16 BRPM | BODY MASS INDEX: 28.61 KG/M2 | TEMPERATURE: 97.1 F | DIASTOLIC BLOOD PRESSURE: 63 MMHG | HEART RATE: 65 BPM | OXYGEN SATURATION: 92 %

## 2023-03-02 DIAGNOSIS — Z00.00 MEDICARE ANNUAL WELLNESS VISIT, SUBSEQUENT: Primary | ICD-10-CM

## 2023-03-02 DIAGNOSIS — Z71.89 ADVANCE CARE PLANNING: ICD-10-CM

## 2023-03-02 DIAGNOSIS — E03.9 HYPOTHYROIDISM, ACQUIRED: ICD-10-CM

## 2023-03-02 DIAGNOSIS — E78.00 HYPERCHOLESTEROLEMIA: ICD-10-CM

## 2023-03-02 DIAGNOSIS — J41.0 SIMPLE CHRONIC BRONCHITIS (HCC): ICD-10-CM

## 2023-03-02 LAB
ALBUMIN SERPL-MCNC: 3.7 G/DL (ref 3.4–5)
ALBUMIN/GLOB SERPL: 0.9 (ref 0.8–1.7)
ALP SERPL-CCNC: 81 U/L (ref 45–117)
ALT SERPL-CCNC: 19 U/L (ref 13–56)
ANION GAP SERPL CALC-SCNC: 3 MMOL/L (ref 3–18)
AST SERPL-CCNC: 19 U/L (ref 10–38)
BILIRUB SERPL-MCNC: 0.5 MG/DL (ref 0.2–1)
BUN SERPL-MCNC: 15 MG/DL (ref 7–18)
BUN/CREAT SERPL: 16 (ref 12–20)
CALCIUM SERPL-MCNC: 9.1 MG/DL (ref 8.5–10.1)
CHLORIDE SERPL-SCNC: 103 MMOL/L (ref 100–111)
CHOLEST SERPL-MCNC: 222 MG/DL
CO2 SERPL-SCNC: 30 MMOL/L (ref 21–32)
CREAT SERPL-MCNC: 0.96 MG/DL (ref 0.6–1.3)
GLOBULIN SER CALC-MCNC: 3.9 G/DL (ref 2–4)
GLUCOSE SERPL-MCNC: 74 MG/DL (ref 74–99)
HDLC SERPL-MCNC: 72 MG/DL (ref 40–60)
HDLC SERPL: 3.1 (ref 0–5)
LDLC SERPL CALC-MCNC: 137.2 MG/DL (ref 0–100)
LIPID PANEL: ABNORMAL
POTASSIUM SERPL-SCNC: 4.5 MMOL/L (ref 3.5–5.5)
PROT SERPL-MCNC: 7.6 G/DL (ref 6.4–8.2)
SODIUM SERPL-SCNC: 136 MMOL/L (ref 136–145)
TRIGL SERPL-MCNC: 64 MG/DL
TSH SERPL DL<=0.05 MIU/L-ACNC: 3.4 UIU/ML (ref 0.36–3.74)
VLDLC SERPL CALC-MCNC: 12.8 MG/DL

## 2023-03-02 PROCEDURE — 36415 COLL VENOUS BLD VENIPUNCTURE: CPT

## 2023-03-02 PROCEDURE — 80053 COMPREHEN METABOLIC PANEL: CPT

## 2023-03-02 PROCEDURE — 84443 ASSAY THYROID STIM HORMONE: CPT

## 2023-03-02 PROCEDURE — 80061 LIPID PANEL: CPT

## 2023-03-02 SDOH — ECONOMIC STABILITY: FOOD INSECURITY: WITHIN THE PAST 12 MONTHS, YOU WORRIED THAT YOUR FOOD WOULD RUN OUT BEFORE YOU GOT MONEY TO BUY MORE.: NEVER TRUE

## 2023-03-02 SDOH — ECONOMIC STABILITY: FOOD INSECURITY: WITHIN THE PAST 12 MONTHS, THE FOOD YOU BOUGHT JUST DIDN'T LAST AND YOU DIDN'T HAVE MONEY TO GET MORE.: NEVER TRUE

## 2023-03-02 SDOH — ECONOMIC STABILITY: HOUSING INSECURITY
IN THE LAST 12 MONTHS, WAS THERE A TIME WHEN YOU DID NOT HAVE A STEADY PLACE TO SLEEP OR SLEPT IN A SHELTER (INCLUDING NOW)?: NO

## 2023-03-02 SDOH — ECONOMIC STABILITY: INCOME INSECURITY: HOW HARD IS IT FOR YOU TO PAY FOR THE VERY BASICS LIKE FOOD, HOUSING, MEDICAL CARE, AND HEATING?: NOT HARD AT ALL

## 2023-03-02 ASSESSMENT — PATIENT HEALTH QUESTIONNAIRE - PHQ9
SUM OF ALL RESPONSES TO PHQ QUESTIONS 1-9: 0
2. FEELING DOWN, DEPRESSED OR HOPELESS: 0
SUM OF ALL RESPONSES TO PHQ QUESTIONS 1-9: 0
1. LITTLE INTEREST OR PLEASURE IN DOING THINGS: 0
SUM OF ALL RESPONSES TO PHQ9 QUESTIONS 1 & 2: 0
SUM OF ALL RESPONSES TO PHQ QUESTIONS 1-9: 0
SUM OF ALL RESPONSES TO PHQ QUESTIONS 1-9: 0

## 2023-03-02 ASSESSMENT — LIFESTYLE VARIABLES
HOW OFTEN DO YOU HAVE A DRINK CONTAINING ALCOHOL: NEVER
HOW MANY STANDARD DRINKS CONTAINING ALCOHOL DO YOU HAVE ON A TYPICAL DAY: PATIENT DOES NOT DRINK

## 2023-03-02 NOTE — PROGRESS NOTES
Didi Stein is a 68 y.o. presents today for   Chief Complaint   Patient presents with    Medicare AWV         Depression Screening:   PHQ-9 Questionaire 3/2/2023 2/22/2022 9/21/2021 2/24/2021   Little interest or pleasure in doing things 0 0 0 0   Feeling down, depressed, or hopeless 0 0 0 0   PHQ-9 Total Score 0 0 0 0       Abuse Screening:  No flowsheet data found. Learning Assessment:  No question data found. Fall Risk:  Fall Risk 3/2/2023   2 or more falls in past year? no   Fall with injury in past year? no           Coordination of Care:   1. \"Have you been to the ER, urgent care clinic since your last visit? Hospitalized since your last visit? \" no    2. \"Have you seen or consulted any other health care providers outside of the [address] [place] [address] since your last visit? \" no    3. For patients aged 39-70: Has the patient had a colonoscopy / FIT/ Cologuard? yes    If the patient is female:    4. For patients aged 41-77: Has the patient had a mammogram within the past 2 years? yes    5. For patients aged 21-65: Has the patient had a pap smear? no    Health Maintenance: reviewed and discussed and ordered per Provider.     Health Maintenance Due   Topic Date Due    Shingles vaccine (1 of 2) Never done    COVID-19 Vaccine (4 - Booster for Moderna series) 10/12/2021    Depression Screen  02/22/2023    Annual Wellness Visit (AWV)  02/23/2023

## 2023-03-02 NOTE — PROGRESS NOTES
Manuela Daniel is a 68 y.o.  female and presents with    Chief Complaint   Patient presents with    Medicare AWV       Subjective: Well Adult Physical   Patient here for a comprehensive physical exam.The patient reports problems - chronic bronchitis, covid, flu 2 months ago  Do you take any herbs or supplements that were not prescribed by a doctor? no Are you taking calcium supplements? no Are you taking aspirin daily? not applicable  COPD Review:    The patient is being seen for follow up of COPD. Oxygen: She currently is not on home oxygen therapy. Symptoms: becomes dyspneic after several blocks. Patient uses 2 pillows at night. Patient does not smoke cigarettes after quitting 30 years ago. Thyroid Review:   Patient is seen for followup of hypothyroidism. Thyroid ROS: denies fatigue, weight changes, heat/cold intolerance, bowel/skin changes or CVS symptoms. ROS    General ROS: negative for - chills, fatigue or fever   Psychological ROS: negative for - anxiety or depression   Ophthalmic ROS: negative for - blurry vision   ENT ROS: negative for - nasal congestion or sore throat   Endocrine ROS: negative for - polydipsia/polyuria or temperature intolerance   Respiratory ROS: diagnosed with copd   Cardiovascular ROS: no chest pain; + dyspnea on exertion when carrying groceries up stairs. Gastrointestinal ROS: no abdominal pain, change in bowel habits, or black or bloody stools   Genito-Urinary ROS: no dysuria, trouble voiding, or hematuria   Musculoskeletal ROS: negative for - joint pain or muscle pain   Neurological ROS: negative for - numbness/tingling or weakness   Dermatological ROS: negative for - rash or skin lesion changes       All other systems reviewed and are negative.       Objective:    /63 (Site: Right Upper Arm)   Pulse 65   Temp 97.1 °F (36.2 °C) (Temporal)   Resp 16   Ht 5' 8\" (1.727 m)   Wt 188 lb 12.8 oz (85.6 kg)   SpO2 92%   BMI 28.71 kg/m² General Appearance:  Alert, cooperative, no distress, appears stated age   Head:  Normocephalic, without obvious abnormality, atraumatic   Eyes:  PERRL, conjunctiva/corneas clear, EOM's intact, fundi benign, both eyes   Ears:  Normal TM's and external ear canals, both ears   Nose: Nares normal, septum midline,mucosa normal, no drainage or sinus tenderness   Throat: Lips, mucosa, and tongue normal; teeth and gums normal   Neck: Supple, symmetrical, trachea midline, no adenopathy;  thyroid: not enlarged, symmetric, no tenderness/mass/nodules; no carotid bruit or JVD   Back:   Symmetric, no curvature, ROM normal, no CVA tenderness   Lungs:   Clear to auscultation bilaterally, respirations unlabored   Breasts:  No masses or tenderness   Heart:  Regular rate and rhythm, S1 and S2 normal, no murmur, rub, or gallop   Abdomen:   Soft, non-tender, bowel sounds active all four quadrants,  no masses, no organomegaly   Pelvic: Deferred   Extremities: Extremities normal, atraumatic, no cyanosis or edema   Pulses: 2+ and symmetric   Skin: Skin color, texture, turgor normal, no rashes or lesions   Lymph nodes: Cervical, supraclavicular, and axillary nodes normal   Neurologic: Normal       Assessment/Plan:    1. Simple chronic bronchitis ([address]  Continue inhaled therapy    2. Medicare annual wellness visit, subsequent  Reviewed preventive recommendations    3. Advance care planning  See ACP    4. Hypercholesterolemia  Assess for efficacy of diet  - Comprehensive Metabolic Panel; Future  - Lipid Panel; Future    5. Hypothyroidism, acquired  Assess for therapeutic dosing  - TSH; Future       Lab review: orders written for new lab studies as appropriate; see orders      I have discussed the diagnosis with the patient and the intended plan as seen in the above orders. The patient has received an after-visit summary and questions were answered concerning future plans.   I have discussed medication side effects and warnings with the patient as well. I have reviewed the plan of care with the patient, accepted their input and they are in agreement with the treatment goals. Medicare Annual Wellness Visit    Cintia Ernandez is here for Medicare AWV    Assessment & Plan   Medicare annual wellness visit, subsequent  Simple chronic bronchitis ([address]  Advance care planning  Hypercholesterolemia  -     Comprehensive Metabolic Panel; Future  -     Lipid Panel; Future  Hypothyroidism, acquired  -     TSH; Future      Recommendations for Preventive Services Due: see orders and patient instructions/AVS.  Recommended screening schedule for the next 5-10 years is provided to the patient in written form: see Patient Instructions/AVS.     Return for Medicare Annual Wellness Visit in 1 year. Subjective       Patient's complete Health Risk Assessment and screening values have been reviewed and are found in Flowsheets. The following problems were reviewed today and where indicated follow up appointments were made and/or referrals ordered.     Positive Risk Factor Screenings with Interventions:                   Hearing Screen:  Do you or your family notice any trouble with your hearing that hasn't been managed with hearing aids?: (!) Yes    Interventions:  Referred to Audiology    Vision Screen:  Do you have difficulty driving, watching TV, or doing any of your daily activities because of your eyesight?: No  Have you had an eye exam within the past year?: (!) No  Vision Screening    Right eye Left eye Both eyes   Without correction 20/20 20/20 20/20   With correction          Interventions:   Patient encouraged to make appointment with their eye specialist            Advance Care Planning   The patient has the following advanced directives on file:  Advance Directives       Power of  Living Will ACP-Advance Directive ACP-Power of     Not on File Filed on 06/13/16 Filed Not on File            The patient has appointed the following active healthcare agents:    Primary Decision Maker: Clement Black Child - [phone]    Secondary Decision Maker: Marlene Noland - [phone]    The Patient has the following current code status:    Code Status: Not on file    Visit Documentation:  I discussed [address] with Didi Stein today which included the patient's choices for care and treatment in the case of a health event that adversely affects decision-making abilities. She stated the Advance Care Directives on file are current. We discussed her current values, goals and care preferences at the end of life. Didi Stein has no questions at this time and has agreed to keep me up-to-date should anything change. 1. Goals of medical treatment were reviewed . 2. Patient's stated goal/treatment preference is full code. 3. Others present during the discussion n/a   4. The discussion lasted 16 minutes. 5. There is no need for change in care plan. Harvey Vanegas MD  3/2/2023                Objective   Vitals:    03/02/23 1124 03/02/23 1126   BP: (!) 151/69 127/63   Site: Left Upper Arm Right Upper Arm   Position: Sitting    Cuff Size: Medium Adult    Pulse: 65    Resp: 16    Temp: 97.1 °F (36.2 °C)    TempSrc: Temporal    SpO2: 92%    Weight: 188 lb 12.8 oz (85.6 kg)    Height: 5' 8\" (1.727 m)       Body mass index is 28.71 kg/m². No Known Allergies  Prior to Visit Medications    Medication Sig Taking?  Authorizing Provider   albuterol sulfate HFA (PROVENTIL;VENTOLIN;PROAIR) 108 (90 Base) MCG/ACT inhaler Inhale 2 puffs into the lungs every 6 hours as needed Yes Ar Automatic Reconciliation   Budeson-Glycopyrrol-Formoterol (BREZTRI AEROSPHERE) 160-9-4.8 MCG/ACT AERO Inhale into the lungs 2 times daily Yes Ar Automatic Reconciliation   calcium carb-cholecalciferol 600-10 MG-MCG TABS per tab Take 1 tablet by mouth daily Yes Ar Automatic Reconciliation   glucosamine-chondroitin 750-600 MG TABS tablet Take 1 tablet by mouth daily Yes Ar Automatic Reconciliation   levothyroxine (SYNTHROID) 75 MCG tablet TAKE 1 TABLET BY MOUTH DAILY BEFORE BREAKFAST Yes Ar Automatic Reconciliation       CareTeam (Including outside providers/suppliers regularly involved in providing care):   Patient Care Team:  Makenzie Dailey MD as PCP - Moo Young MD as PCP - Empaneled Provider  Laron Amaya MD as Consulting Physician     Reviewed and updated this visit:  Tobacco  Allergies  Meds  Problems  Med Hx  Surg Hx  Soc Hx  Fam Hx               Makenzie Dailey MD

## 2023-03-02 NOTE — PATIENT INSTRUCTIONS
Learning About Vision Tests  What are vision tests? The four most common vision tests are visual acuity tests, refraction, visual field tests, and color vision tests. Visual acuity (sharpness) tests  These tests are used: To see if you need glasses or contact lenses. To monitor an eye problem. To check an eye injury. Visual acuity tests are done as part of routine exams. You may also have this test when you get your 's license or apply for some types of jobs. Visual field tests  These tests are used: To check for vision loss in any area of your range of vision. To screen for certain eye diseases. To look for nerve damage after a stroke, head injury, or other problem that could reduce blood flow to the brain. Refraction and color tests  A refraction test is done to find the right prescription for glasses and contact lenses. A color vision test is done to check for color blindness. Color vision is often tested as part of a routine exam. You may also have this test when you apply for a job where recognizing different colors is important, such as , electronics, or the [employer]. How are vision tests done? Visual acuity test   You cover one eye at a time. You read aloud from a wall chart across the room. You read aloud from a small card that you hold in your hand. Refraction   You look into a special device. The device puts lenses of different strengths in front of each eye to see how strong your glasses or contact lenses need to be. Visual field tests   Your doctor may have you look through special machines. Or your doctor may simply have you stare straight ahead while they move a finger into and out of your field of vision. Color vision test   You look at pieces of printed test patterns in various colors. You say what number or symbol you see. Your doctor may have you trace the number or symbol using a pointer. How do these tests feel?   There is very little chance of having a problem from this test. If dilating drops are used for a vision test, they may make the eyes sting and cause a medicine taste in the mouth. Follow-up care is a key part of your treatment and safety. Be sure to make and go to all appointments, and call your doctor if you are having problems. It's also a good idea to know your test results and keep a list of the medicines you take. Where can you learn more? Go to http://www.rajput.com/ and enter G551 to learn more about \"Learning About Vision Tests. \"  Current as of: October 12, 2022               Content Version: 13.5  © 7908-3436 [employer]. Care instructions adapted under license by [facility] ([facility]). If you have questions about a medical condition or this instruction, always ask your healthcare professional. [address] any warranty or liability for your use of this information. Advance Directives: Care Instructions  Overview  An advance directive is a legal way to state your wishes at the end of your life. It tells your family and your doctor what to do if you can't say what you want. There are two main types of advance directives. You can change them any time your wishes change. Living will. This form tells your family and your doctor your wishes about life support and other treatment. The form is also called a declaration. Medical power of . This form lets you name a person to make treatment decisions for you when you can't speak for yourself. This person is called a health care agent (health care proxy, health care surrogate). The form is also called a durable power of  for health care. If you do not have an advance directive, decisions about your medical care may be made by a family member, or by a doctor or a  who doesn't know you. It may help to think of an advance directive as a gift to the people who care for you.  If you have one, they won't have to make tough decisions by themselves. For more information, including forms for your state, see the [address] website (www.caringinfo.org/planning/advance-directives/). Follow-up care is a key part of your treatment and safety. Be sure to make and go to all appointments, and call your doctor if you are having problems. It's also a good idea to know your test results and keep a list of the medicines you take. What should you include in an advance directive? Many states have a unique advance directive form. (It may ask you to address specific issues.) Or you might use a universal form that's approved by many states. If your form doesn't tell you what to address, it may be hard to know what to include in your advance directive. Use the questions below to help you get started. Who do you want to make decisions about your medical care if you are not able to? What life-support measures do you want if you have a serious illness that gets worse over time or can't be cured? What are you most afraid of that might happen? (Maybe you're afraid of having pain, losing your independence, or being kept alive by machines.)  Where would you prefer to die? (Your home? A hospital? A nursing home?)  Do you want to donate your organs when you die? Do you want certain Buddhist practices performed before you die? When should you call for help? Be sure to contact your doctor if you have any questions. Where can you learn more? Go to http://www.rajput.com/ and enter R264 to learn more about \"Advance Directives: Care Instructions. \"  Current as of: June 16, 2022               Content Version: 13.5  © 5091-4466 [employer]. Care instructions adapted under license by [facility] ([facility]). If you have questions about a medical condition or this instruction, always ask your healthcare professional. [address] any warranty or liability for your use of this information.            A Healthy Heart: Care Instructions  Your Care Instructions     Coronary artery disease, also called heart disease, occurs when a substance called plaque builds up in the vessels that supply oxygen-rich blood to your heart muscle. This can narrow the blood vessels and reduce blood flow. A heart attack happens when blood flow is completely blocked. A high-fat diet, smoking, and other factors increase the risk of heart disease. Your doctor has found that you have a chance of having heart disease. You can do lots of things to keep your heart healthy. It may not be easy, but you can change your diet, exercise more, and quit smoking. These steps really work to lower your chance of heart disease. Follow-up care is a key part of your treatment and safety. Be sure to make and go to all appointments, and call your doctor if you are having problems. It's also a good idea to know your test results and keep a list of the medicines you take. How can you care for yourself at home? Diet    Use less salt when you cook and eat. This helps lower your blood pressure. Taste food before salting. Add only a little salt when you think you need it. With time, your taste buds will adjust to less salt.     Eat fewer snack items, fast foods, canned soups, and other high-salt, high-fat, processed foods.     Read food labels and try to avoid saturated and trans fats. They increase your risk of heart disease by raising cholesterol levels.     Limit the amount of solid fat-butter, margarine, and shortening-you eat. Use olive, peanut, or canola oil when you cook. Bake, broil, and steam foods instead of frying them.     Eat a variety of fruit and vegetables every day. Dark green, deep orange, red, or yellow fruits and vegetables are especially good for you. Examples include spinach, carrots, peaches, and berries.     Foods high in fiber can reduce your cholesterol and provide important vitamins and minerals.  High-fiber foods include whole-grain cereals and breads, oatmeal, beans, brown rice, citrus fruits, and apples.     Eat lean proteins. Heart-healthy proteins include seafood, lean meats and poultry, eggs, beans, peas, nuts, seeds, and soy products.     Limit drinks and foods with added sugar. These include candy, desserts, and soda pop. Lifestyle changes    If your doctor recommends it, get more exercise. Walking is a good choice. Bit by bit, increase the amount you walk every day. Try for at least 30 minutes on most days of the week. You also may want to swim, bike, or do other activities.     Do not smoke. If you need help quitting, talk to your doctor about stop-smoking programs and medicines. These can increase your chances of quitting for good. Quitting smoking may be the most important step you can take to protect your heart. It is never too late to quit.     Limit alcohol to 2 drinks a day for men and 1 drink a day for women. Too much alcohol can cause health problems.     Manage other health problems such as diabetes, high blood pressure, and high cholesterol. If you think you may have a problem with alcohol or drug use, talk to your doctor. Medicines    Take your medicines exactly as prescribed. Call your doctor if you think you are having a problem with your medicine.     If your doctor recommends aspirin, take the amount directed each day. Make sure you take aspirin and not another kind of pain reliever, such as acetaminophen (Tylenol). When should you call for help? Call 911 if you have symptoms of a heart attack. These may include:    Chest pain or pressure, or a strange feeling in the chest.     Sweating.     Shortness of breath.     Pain, pressure, or a strange feeling in the back, neck, jaw, or upper belly or in one or both shoulders or arms.     Lightheadedness or sudden weakness.     A fast or irregular heartbeat. After you call 911, the  may tell you to chew 1 adult-strength or 2 to 4 low-dose aspirin. Wait for an ambulance.  Do not try to drive yourself. Watch closely for changes in your health, and be sure to contact your doctor if you have any problems. Where can you learn more? Go to http://www.rajput.com/ and enter F075 to learn more about \"A Healthy Heart: Care Instructions. \"  Current as of: September 7, 2022               Content Version: 13.5  © 2006-2022 [email or web address]. Care instructions adapted under license by [facility] ([facility]). If you have questions about a medical condition or this instruction, always ask your healthcare professional. [address] any warranty or liability for your use of this information. Personalized Preventive Plan for Triny Baez - 3/2/2023  Medicare offers a range of preventive health benefits. Some of the tests and screenings are paid in full while other may be subject to a deductible, co-insurance, and/or copay. Some of these benefits include a comprehensive review of your medical history including lifestyle, illnesses that may run in your family, and various assessments and screenings as appropriate. After reviewing your medical record and screening and assessments performed today your provider may have ordered immunizations, labs, imaging, and/or referrals for you. A list of these orders (if applicable) as well as your Preventive Care list are included within your After Visit Summary for your review. Other Preventive Recommendations:    A preventive eye exam performed by an eye specialist is recommended every 1-2 years to screen for glaucoma; cataracts, macular degeneration, and other eye disorders. A preventive dental visit is recommended every 6 months. Try to get at least 150 minutes of exercise per week or 10,000 steps per day on a pedometer . Order or download the FREE \"Exercise & Physical Activity: Your Everyday Guide\" from [employer]. Call 0-394.325.9832 or search [employer] online.   You need 4249-0600 mg of calcium and 1635-4101 IU of vitamin D per day. It is possible to meet your calcium requirement with diet alone, but a vitamin D supplement is usually necessary to meet this goal.  When exposed to the sun, use a sunscreen that protects against both UVA and UVB radiation with an SPF of 30 or greater. Reapply every 2 to 3 hours or after sweating, drying off with a towel, or swimming. Always wear a seat belt when traveling in a car. Always wear a helmet when riding a bicycle or motorcycle.

## 2023-07-31 RX ORDER — BUDESONIDE, GLYCOPYRROLATE, AND FORMOTEROL FUMARATE 160; 9; 4.8 UG/1; UG/1; UG/1
AEROSOL, METERED RESPIRATORY (INHALATION)
Qty: 32.1 G | OUTPATIENT
Start: 2023-07-31

## 2023-09-28 ENCOUNTER — HOSPITAL ENCOUNTER (OUTPATIENT)
Dept: WOMENS IMAGING | Facility: HOSPITAL | Age: 78
Discharge: HOME | End: 2023-09-28
Payer: MEDICARE

## 2023-09-28 VITALS — BODY MASS INDEX: 26.61 KG/M2 | WEIGHT: 175 LBS

## 2023-09-28 DIAGNOSIS — Z12.31 VISIT FOR SCREENING MAMMOGRAM: ICD-10-CM

## 2023-09-28 PROCEDURE — 77063 BREAST TOMOSYNTHESIS BI: CPT

## 2023-10-31 ENCOUNTER — TELEPHONE (OUTPATIENT)
Facility: CLINIC | Age: 78
End: 2023-10-31

## 2023-10-31 NOTE — TELEPHONE ENCOUNTER
Pt called to request medication refill for [facility], but states the last time she received this medication from Dr. Sarah Ramon was back on 7/23/2022. Scheduled appointment for medication evaluation/refill and also patient requesting a Flu and RSV vaccine. Patient is not sure if Dr. Sarah Ramon wants her to get the RSV vaccine, but states she can discuss during her appointment. Future Appointments   Date Time Provider [address]   11/8/2023  1:30 PM Dontrell Espino MD DMA BS [facility]     Thank you.

## 2023-11-08 ENCOUNTER — OFFICE VISIT (OUTPATIENT)
Facility: CLINIC | Age: 78
End: 2023-11-08

## 2023-11-08 VITALS
WEIGHT: 178 LBS | DIASTOLIC BLOOD PRESSURE: 74 MMHG | OXYGEN SATURATION: 85 % | BODY MASS INDEX: 26.98 KG/M2 | RESPIRATION RATE: 16 BRPM | TEMPERATURE: 99 F | SYSTOLIC BLOOD PRESSURE: 138 MMHG | HEIGHT: 68 IN | HEART RATE: 72 BPM

## 2023-11-08 DIAGNOSIS — J30.89 SEASONAL ALLERGIC RHINITIS DUE TO OTHER ALLERGIC TRIGGER: ICD-10-CM

## 2023-11-08 DIAGNOSIS — Z23 NEEDS FLU SHOT: ICD-10-CM

## 2023-11-08 DIAGNOSIS — E78.00 HYPERCHOLESTEROLEMIA: ICD-10-CM

## 2023-11-08 DIAGNOSIS — J41.0 SIMPLE CHRONIC BRONCHITIS (HCC): Primary | ICD-10-CM

## 2023-11-08 DIAGNOSIS — E03.9 HYPOTHYROIDISM, ACQUIRED: ICD-10-CM

## 2023-11-08 RX ORDER — LEVOTHYROXINE SODIUM 0.07 MG/1
75 TABLET ORAL
Qty: 90 TABLET | Refills: 1 | Status: SHIPPED | OUTPATIENT
Start: 2023-11-08

## 2023-11-08 RX ORDER — ALBUTEROL SULFATE 90 UG/1
2 AEROSOL, METERED RESPIRATORY (INHALATION) EVERY 6 HOURS PRN
Qty: 18 G | Refills: 5 | Status: SHIPPED | OUTPATIENT
Start: 2023-11-08

## 2023-11-08 RX ORDER — CETIRIZINE HYDROCHLORIDE 10 MG/1
10 TABLET ORAL DAILY
Qty: 30 TABLET | Refills: 5 | Status: SHIPPED | OUTPATIENT
Start: 2023-11-08

## 2023-11-08 RX ORDER — BUDESONIDE, GLYCOPYRROLATE, AND FORMOTEROL FUMARATE 160; 9; 4.8 UG/1; UG/1; UG/1
1 AEROSOL, METERED RESPIRATORY (INHALATION) 2 TIMES DAILY
Qty: 3 EACH | Refills: 3 | Status: SHIPPED | OUTPATIENT
Start: 2023-11-08

## 2023-11-08 ASSESSMENT — ENCOUNTER SYMPTOMS
NAUSEA: 0
VOMITING: 0
SPUTUM PRODUCTION: 1
ABDOMINAL PAIN: 0
DIARRHEA: 0
SHORTNESS OF BREATH: 1
COUGH: 0

## 2023-11-08 NOTE — PROGRESS NOTES
Rk Crowe is a 66 y.o. presents today for   Chief Complaint   Patient presents with    Medication Refill     Is someone accompanying this pt? no    Is the patient using any DME equipment during OV? no  There were no vitals filed for this visit. Depression Screening:       3/2/2023    11:18 AM 2/22/2022    10:16 AM 9/21/2021     9:17 AM 2/24/2021    10:48 AM   PHQ-9 Questionaire   Little interest or pleasure in doing things 0 0 0 0   Feeling down, depressed, or hopeless 0 0 0 0   PHQ-9 Total Score 0 0 0 0        Abuse Screening:       No data to display                 Learning Assessment Screening:   No question data found. Fall Risk Screening:       3/2/2023    11:18 AM   Fall Risk   2 or more falls in past year? no   Fall with injury in past year? no           Health Maintenance: reviewed and discussed and ordered per Provider. Health Maintenance Due   Topic Date Due    Shingles vaccine (1 of 2) Never done    Flu vaccine (1) 08/01/2023    COVID-19 Vaccine (5 - 2023-24 season) 09/01/2023         Coordination of Care:   1. \"Have you been to the ER, urgent care clinic since your last visit? Hospitalized since your last visit? \" no    2. \"Have you seen or consulted any other health care providers outside of the [address] [place] [address] since your last visit? \" no    3. For patients aged 43-73: Has the patient had a colonoscopy / FIT/ Cologuard? NA - based on age or sex  If the patient is female:    3. For patients aged 43-66: Has the patient had a mammogram within the past 2 years? NA - based on age or sex    11. For patients aged 21-65: Has the patient had a pap smear? NA - based on age or sex    Advanced Directive:  1. Do you have an Advanced Directive? Yes     2. Would you like information on Advanced Directives?  No    Renny Paul CMA

## 2023-11-08 NOTE — PROGRESS NOTES
Leonel Dark  66 y. o.female  11/8/23  2:47 PM      HPI:  Ms. Alena Pittman presents for refill her medications. She has COPD and is taking breztri inhaled 2 times daily and albuterol as needed. She says that she uses her albuterol as frequent as once per day. Her O2 sat in the morning can be as low as in the 80s but improves when she uses her treatments. She also needs a refill of her synthroid. She denies constipation, fatigue or hair loss. She wants a flu vaccine today. PMH:  Past Medical History:   Diagnosis Date    Acute bronchitis 12/28/2015    Advance directive discussed with patient 5/4/2016    Pt would like to appoint her son, Mitch Cazares as her medical decision maker in the event that she can no longer do so. Phone number is [phone]. Her secondary person is her next door [employer]. Phone number is [phone]. If her death is imminent and medical treatment will not help her recover, pt does want life prolonging measures.   If her condition makes her unawar    Bilateral leg pain 10/22/2015    Elevated TSH 10/22/2015    Hashimoto's thyroiditis 12/28/2015    Hernia, abdominal     History of benign breast tumor     Menopause     Nicotine dependence in remission 2/4/2016    Obesity, Class I, BMI 30-34.9 2/4/2016    Prediabetes 10/22/2015    Simple chronic bronchitis ([address]) 5/4/2016    Skin lesion 2/4/2016    Varicose vein of leg            MEDS:  Current Outpatient Medications   Medication Sig Dispense Refill    Budeson-Glycopyrrol-Formoterol (BREZTRI AEROSPHERE) 160-9-4.8 MCG/ACT AERO Inhale 1 Inhalation into the lungs 2 times daily 3 each 3    cetirizine (ZYRTEC) 10 MG tablet Take 1 tablet by mouth daily 30 tablet 5    albuterol sulfate HFA (PROVENTIL;VENTOLIN;PROAIR) 108 (90 Base) MCG/ACT inhaler Inhale 2 puffs into the lungs every 6 hours as needed for Shortness of Breath or Wheezing 18 g 5    levothyroxine (SYNTHROID) 75 MCG tablet Take 1 tablet by mouth every

## 2024-03-08 ENCOUNTER — TELEPHONE (OUTPATIENT)
Facility: CLINIC | Age: 79
End: 2024-03-08

## 2024-03-08 NOTE — TELEPHONE ENCOUNTER
----- Message from Whitney Luque Klesea sent at 3/8/2024 12:05 PM EST -----  Subject: Message to Provider    QUESTIONS  Information for Provider? The patient returned call to Luz Marina to confirm   her appointment for 3/11 at 10. She would like to change that visit type   to a cough/cold. please contact the patient about this request   ---------------------------------------------------------------------------  --------------  CALL BACK INFO  [phone]; OK to leave message on voicemail  ---------------------------------------------------------------------------  --------------  SCRIPT ANSWERS  undefined

## 2024-03-10 SDOH — HEALTH STABILITY: PHYSICAL HEALTH: ON AVERAGE, HOW MANY DAYS PER WEEK DO YOU ENGAGE IN MODERATE TO STRENUOUS EXERCISE (LIKE A BRISK WALK)?: 3 DAYS

## 2024-03-10 SDOH — HEALTH STABILITY: PHYSICAL HEALTH: ON AVERAGE, HOW MANY MINUTES DO YOU ENGAGE IN EXERCISE AT THIS LEVEL?: 20 MIN

## 2024-03-10 ASSESSMENT — LIFESTYLE VARIABLES
HOW OFTEN DO YOU HAVE A DRINK CONTAINING ALCOHOL: MONTHLY OR LESS
HOW OFTEN DO YOU HAVE SIX OR MORE DRINKS ON ONE OCCASION: 1
HOW MANY STANDARD DRINKS CONTAINING ALCOHOL DO YOU HAVE ON A TYPICAL DAY: 1
HOW OFTEN DO YOU HAVE A DRINK CONTAINING ALCOHOL: 2
HOW MANY STANDARD DRINKS CONTAINING ALCOHOL DO YOU HAVE ON A TYPICAL DAY: 1 OR 2

## 2024-03-10 ASSESSMENT — PATIENT HEALTH QUESTIONNAIRE - PHQ9
SUM OF ALL RESPONSES TO PHQ QUESTIONS 1-9: 0
2. FEELING DOWN, DEPRESSED OR HOPELESS: 0
SUM OF ALL RESPONSES TO PHQ QUESTIONS 1-9: 0
1. LITTLE INTEREST OR PLEASURE IN DOING THINGS: 0
SUM OF ALL RESPONSES TO PHQ QUESTIONS 1-9: 0
SUM OF ALL RESPONSES TO PHQ9 QUESTIONS 1 & 2: 0
SUM OF ALL RESPONSES TO PHQ QUESTIONS 1-9: 0

## 2024-03-11 ENCOUNTER — OFFICE VISIT (OUTPATIENT)
Facility: CLINIC | Age: 79
End: 2024-03-11
Payer: MEDICARE

## 2024-03-11 ENCOUNTER — HOSPITAL ENCOUNTER (OUTPATIENT)
Facility: HOSPITAL | Age: 79
Setting detail: SPECIMEN
Discharge: HOME | End: 2024-03-14
Payer: MEDICARE

## 2024-03-11 VITALS
SYSTOLIC BLOOD PRESSURE: 122 MMHG | WEIGHT: 172.8 LBS | DIASTOLIC BLOOD PRESSURE: 60 MMHG | BODY MASS INDEX: 26.19 KG/M2 | OXYGEN SATURATION: 88 % | RESPIRATION RATE: 18 BRPM | HEART RATE: 84 BPM | HEIGHT: 68 IN | TEMPERATURE: 97.9 F

## 2024-03-11 DIAGNOSIS — E03.9 HYPOTHYROIDISM, ACQUIRED: ICD-10-CM

## 2024-03-11 DIAGNOSIS — Z71.89 ADVANCE CARE PLANNING: ICD-10-CM

## 2024-03-11 DIAGNOSIS — E78.5 HYPERLIPIDEMIA WITH TARGET LDL LESS THAN 70: ICD-10-CM

## 2024-03-11 DIAGNOSIS — J44.1 COPD EXACERBATION (HCC): ICD-10-CM

## 2024-03-11 DIAGNOSIS — J30.89 SEASONAL ALLERGIC RHINITIS DUE TO OTHER ALLERGIC TRIGGER: ICD-10-CM

## 2024-03-11 DIAGNOSIS — Z00.00 MEDICARE ANNUAL WELLNESS VISIT, SUBSEQUENT: ICD-10-CM

## 2024-03-11 DIAGNOSIS — Z99.81 SUPPLEMENTAL OXYGEN DEPENDENT: ICD-10-CM

## 2024-03-11 DIAGNOSIS — J43.1 PANLOBULAR EMPHYSEMA (HCC): Primary | ICD-10-CM

## 2024-03-11 PROBLEM — J20.9 ACUTE BRONCHITIS: Status: RESOLVED | Noted: 2018-03-08 | Resolved: 2024-03-11

## 2024-03-11 LAB
ALBUMIN SERPL-MCNC: 3.2 G/DL (ref 3.4–5)
ALBUMIN/GLOB SERPL: 0.7 (ref 0.8–1.7)
ALP SERPL-CCNC: 80 U/L (ref 45–117)
ALT SERPL-CCNC: 14 U/L (ref 13–56)
ANION GAP SERPL CALC-SCNC: 8 MMOL/L (ref 3–18)
AST SERPL-CCNC: 19 U/L (ref 10–38)
BILIRUB SERPL-MCNC: 1.1 MG/DL (ref 0.2–1)
BUN SERPL-MCNC: 20 MG/DL (ref 7–18)
BUN/CREAT SERPL: 15 (ref 12–20)
CALCIUM SERPL-MCNC: 8.6 MG/DL (ref 8.5–10.1)
CHLORIDE SERPL-SCNC: 100 MMOL/L (ref 100–111)
CHOLEST SERPL-MCNC: 181 MG/DL
CO2 SERPL-SCNC: 25 MMOL/L (ref 21–32)
CREAT SERPL-MCNC: 1.37 MG/DL (ref 0.6–1.3)
GLOBULIN SER CALC-MCNC: 4.6 G/DL (ref 2–4)
GLUCOSE SERPL-MCNC: 104 MG/DL (ref 74–99)
HDLC SERPL-MCNC: 45 MG/DL (ref 40–60)
HDLC SERPL: 4 (ref 0–5)
LDLC SERPL CALC-MCNC: 118.8 MG/DL (ref 0–100)
LIPID PANEL: ABNORMAL
POTASSIUM SERPL-SCNC: 4.2 MMOL/L (ref 3.5–5.5)
PROT SERPL-MCNC: 7.8 G/DL (ref 6.4–8.2)
SODIUM SERPL-SCNC: 133 MMOL/L (ref 136–145)
TRIGL SERPL-MCNC: 86 MG/DL
TSH SERPL DL<=0.05 MIU/L-ACNC: 4.24 UIU/ML (ref 0.36–3.74)
VLDLC SERPL CALC-MCNC: 17.2 MG/DL

## 2024-03-11 PROCEDURE — 99214 OFFICE O/P EST MOD 30 MIN: CPT | Performed by: FAMILY MEDICINE

## 2024-03-11 PROCEDURE — G0439 PPPS, SUBSEQ VISIT: HCPCS | Performed by: FAMILY MEDICINE

## 2024-03-11 PROCEDURE — 80061 LIPID PANEL: CPT

## 2024-03-11 PROCEDURE — 36415 COLL VENOUS BLD VENIPUNCTURE: CPT

## 2024-03-11 PROCEDURE — 99497 ADVNCD CARE PLAN 30 MIN: CPT | Performed by: FAMILY MEDICINE

## 2024-03-11 PROCEDURE — 84443 ASSAY THYROID STIM HORMONE: CPT

## 2024-03-11 PROCEDURE — 80053 COMPREHEN METABOLIC PANEL: CPT

## 2024-03-11 RX ORDER — AZITHROMYCIN 250 MG/1
TABLET, FILM COATED ORAL
Qty: 6 TABLET | Refills: 0 | Status: SHIPPED | OUTPATIENT
Start: 2024-03-11 | End: 2024-03-21

## 2024-03-11 RX ORDER — PREDNISONE 10 MG/1
TABLET ORAL
Qty: 30 TABLET | Refills: 0 | Status: SHIPPED | OUTPATIENT
Start: 2024-03-11

## 2024-03-11 RX ORDER — BUDESONIDE, GLYCOPYRROLATE, AND FORMOTEROL FUMARATE 160; 9; 4.8 UG/1; UG/1; UG/1
2 AEROSOL, METERED RESPIRATORY (INHALATION) 2 TIMES DAILY
Qty: 3 EACH | Refills: 3 | Status: SHIPPED | OUTPATIENT
Start: 2024-03-11

## 2024-03-11 SDOH — ECONOMIC STABILITY: FOOD INSECURITY: WITHIN THE PAST 12 MONTHS, YOU WORRIED THAT YOUR FOOD WOULD RUN OUT BEFORE YOU GOT MONEY TO BUY MORE.: NEVER TRUE

## 2024-03-11 SDOH — ECONOMIC STABILITY: FOOD INSECURITY: WITHIN THE PAST 12 MONTHS, THE FOOD YOU BOUGHT JUST DIDN'T LAST AND YOU DIDN'T HAVE MONEY TO GET MORE.: NEVER TRUE

## 2024-03-11 SDOH — ECONOMIC STABILITY: INCOME INSECURITY: HOW HARD IS IT FOR YOU TO PAY FOR THE VERY BASICS LIKE FOOD, HOUSING, MEDICAL CARE, AND HEATING?: NOT HARD AT ALL

## 2024-03-11 ASSESSMENT — ANXIETY QUESTIONNAIRES
1. FEELING NERVOUS, ANXIOUS, OR ON EDGE: 0
5. BEING SO RESTLESS THAT IT IS HARD TO SIT STILL: 0
4. TROUBLE RELAXING: 0
3. WORRYING TOO MUCH ABOUT DIFFERENT THINGS: 0
2. NOT BEING ABLE TO STOP OR CONTROL WORRYING: 0
GAD7 TOTAL SCORE: 0
7. FEELING AFRAID AS IF SOMETHING AWFUL MIGHT HAPPEN: 0
6. BECOMING EASILY ANNOYED OR IRRITABLE: 0
IF YOU CHECKED OFF ANY PROBLEMS ON THIS QUESTIONNAIRE, HOW DIFFICULT HAVE THESE PROBLEMS MADE IT FOR YOU TO DO YOUR WORK, TAKE CARE OF THINGS AT HOME, OR GET ALONG WITH OTHER PEOPLE: NOT DIFFICULT AT ALL

## 2024-03-11 NOTE — PROGRESS NOTES
Advance Care Planning   The patient has the following advanced directives on file:  Advance Directives       Power of  Living Will ACP-Advance Directive ACP-Power of     Not on File Filed on 06/13/16 Filed Not on File            The patient has appointed the following active healthcare agents:    Primary Decision Maker: Dong Valdivia - [phone]    Secondary Decision Maker: Shelbie Pak - Vaibhav - [phone]    The Patient has the following current code status:    Code Status: full code    Visit Documentation:  I discussed Advance Care Planning with Deena Valdivia today which included the patient's choices for care and treatment in the case of a health event that adversely affects decision-making abilities. She stated the Advance Care Directives on file are current. We discussed her current values, goals and care preferences at the end of life.  Deena Valdivia has no questions at this time and has agreed to keep me up-to-date should anything change.     1. Goals of medical treatment were reviewed .   2. Patient's stated goal/treatment preference is full code.  3. Others present during the discussion none   4. The discussion lasted 16 minutes.  5. I will notify family of change in care plan.    Jae Espino MD  3/11/2024       
[phone]  Fax: [phone]  
(SYNTHROID) 75 MCG tablet Take 1 tablet by mouth every morning (before breakfast) Yes Jae Espino MD   calcium carb-cholecalciferol 600-10 MG-MCG TABS per tab Take 1 tablet by mouth daily Yes Automatic Reconciliation, Ar   glucosamine-chondroitin 750-600 MG TABS tablet Take 1 tablet by mouth daily Yes Automatic Reconciliation, Ar       CareTeam (Including outside providers/suppliers regularly involved in providing care):   Patient Care Team:  Jae Espino MD as PCP - General  Jae Espino MD as PCP - Empaneled Provider  Jt Tavarez MD as Consulting Physician     Reviewed and updated this visit:  Tobacco  Allergies  Meds  Problems  Med Hx  Surg Hx  Soc Hx  Fam Hx

## 2024-03-11 NOTE — PATIENT INSTRUCTIONS
increase the time you're active every day. Try for at least 30 minutes on most days of the week.     Try to quit or cut back on using tobacco and other nicotine products. This includes smoking and vaping. If you need help quitting, talk to your doctor about stop-smoking programs and medicines. These can increase your chances of quitting for good. Quitting is one of the most important things you can do to protect your heart. It is never too late to quit. Try to avoid secondhand smoke too.     Stay at a weight that's healthy for you. Talk to your doctor if you need help losing weight.     Try to get 7 to 9 hours of sleep each night.     Limit alcohol to 2 drinks a day for men and 1 drink a day for women. Too much alcohol can cause health problems.     Manage other health problems such as diabetes, high blood pressure, and high cholesterol. If you think you may have a problem with alcohol or drug use, talk to your doctor.   Medicines    Take your medicines exactly as prescribed. Call your doctor if you think you are having a problem with your medicine.     If your doctor recommends aspirin, take the amount directed each day. Make sure you take aspirin and not another kind of pain reliever, such as acetaminophen (Tylenol).   When should you call for help?   Call 911 if you have symptoms of a heart attack. These may include:    Chest pain or pressure, or a strange feeling in the chest.     Sweating.     Shortness of breath.     Pain, pressure, or a strange feeling in the back, neck, jaw, or upper belly or in one or both shoulders or arms.     Lightheadedness or sudden weakness.     A fast or irregular heartbeat.   After you call 911, the  may tell you to chew 1 adult-strength or 2 to 4 low-dose aspirin. Wait for an ambulance. Do not try to drive yourself.  Watch closely for changes in your health, and be sure to contact your doctor if you have any problems.  Where can you learn more?  Go to

## 2024-04-08 DIAGNOSIS — E03.9 HYPOTHYROIDISM, ACQUIRED: Primary | ICD-10-CM

## 2024-04-08 DIAGNOSIS — N28.9 DECREASED RENAL FUNCTION: ICD-10-CM

## 2024-07-08 ENCOUNTER — HOSPITAL ENCOUNTER (OUTPATIENT)
Facility: HOSPITAL | Age: 79
Setting detail: SPECIMEN
Discharge: HOME | End: 2024-07-11
Payer: MEDICARE

## 2024-07-08 DIAGNOSIS — N28.9 DECREASED RENAL FUNCTION: ICD-10-CM

## 2024-07-08 DIAGNOSIS — E03.9 HYPOTHYROIDISM, ACQUIRED: ICD-10-CM

## 2024-07-08 LAB
ALBUMIN SERPL-MCNC: 3.4 G/DL (ref 3.4–5)
ALBUMIN/GLOB SERPL: 0.8 (ref 0.8–1.7)
ALP SERPL-CCNC: 80 U/L (ref 45–117)
ALT SERPL-CCNC: 19 U/L (ref 13–56)
ANION GAP SERPL CALC-SCNC: 8 MMOL/L (ref 3–18)
AST SERPL-CCNC: 21 U/L (ref 10–38)
BILIRUB SERPL-MCNC: 0.7 MG/DL (ref 0.2–1)
BUN SERPL-MCNC: 19 MG/DL (ref 7–18)
BUN/CREAT SERPL: 17 (ref 12–20)
CALCIUM SERPL-MCNC: 9 MG/DL (ref 8.5–10.1)
CHLORIDE SERPL-SCNC: 105 MMOL/L (ref 100–111)
CO2 SERPL-SCNC: 27 MMOL/L (ref 21–32)
CREAT SERPL-MCNC: 1.12 MG/DL (ref 0.6–1.3)
GLOBULIN SER CALC-MCNC: 4.2 G/DL (ref 2–4)
GLUCOSE SERPL-MCNC: 75 MG/DL (ref 74–99)
POTASSIUM SERPL-SCNC: 4.2 MMOL/L (ref 3.5–5.5)
PROT SERPL-MCNC: 7.6 G/DL (ref 6.4–8.2)
SODIUM SERPL-SCNC: 140 MMOL/L (ref 136–145)
TSH SERPL DL<=0.05 MIU/L-ACNC: 3.14 UIU/ML (ref 0.36–3.74)

## 2024-07-08 PROCEDURE — 36415 COLL VENOUS BLD VENIPUNCTURE: CPT

## 2024-07-08 PROCEDURE — 80053 COMPREHEN METABOLIC PANEL: CPT

## 2024-07-08 PROCEDURE — 84443 ASSAY THYROID STIM HORMONE: CPT

## 2024-08-05 DIAGNOSIS — E03.9 HYPOTHYROIDISM, ACQUIRED: ICD-10-CM

## 2024-08-05 NOTE — TELEPHONE ENCOUNTER
Patient called in requesting a refill on the following: levothyroxine (SYNTHROID) 75 MCG tablet   LOV: 3/11/24   NOV: 9/11/24   Please send refills to [facility]

## 2024-08-07 RX ORDER — LEVOTHYROXINE SODIUM 0.07 MG/1
75 TABLET ORAL
Qty: 90 TABLET | Refills: 1 | Status: SHIPPED | OUTPATIENT
Start: 2024-08-07

## 2024-09-11 ENCOUNTER — OFFICE VISIT (OUTPATIENT)
Facility: CLINIC | Age: 79
End: 2024-09-11
Payer: MEDICARE

## 2024-09-11 VITALS
RESPIRATION RATE: 17 BRPM | TEMPERATURE: 97.7 F | HEIGHT: 68 IN | BODY MASS INDEX: 27.04 KG/M2 | OXYGEN SATURATION: 95 % | HEART RATE: 74 BPM | WEIGHT: 178.4 LBS | SYSTOLIC BLOOD PRESSURE: 149 MMHG | DIASTOLIC BLOOD PRESSURE: 79 MMHG

## 2024-09-11 DIAGNOSIS — Z23 NEEDS FLU SHOT: ICD-10-CM

## 2024-09-11 DIAGNOSIS — R03.0 ELEVATED BLOOD PRESSURE READING: ICD-10-CM

## 2024-09-11 DIAGNOSIS — J43.1 PANLOBULAR EMPHYSEMA (HCC): Primary | ICD-10-CM

## 2024-09-11 DIAGNOSIS — N18.31 CHRONIC KIDNEY DISEASE, STAGE 3A (HCC): ICD-10-CM

## 2024-09-11 DIAGNOSIS — E03.9 HYPOTHYROIDISM, ACQUIRED: ICD-10-CM

## 2024-09-11 PROBLEM — J44.1 COPD WITH EXACERBATION (HCC): Status: RESOLVED | Noted: 2018-03-07 | Resolved: 2024-09-11

## 2024-09-11 PROBLEM — J96.01 ACUTE HYPOXEMIC RESPIRATORY FAILURE (HCC): Status: RESOLVED | Noted: 2020-01-06 | Resolved: 2024-09-11

## 2024-09-11 PROCEDURE — 99214 OFFICE O/P EST MOD 30 MIN: CPT | Performed by: FAMILY MEDICINE

## 2024-09-11 PROCEDURE — 1123F ACP DISCUSS/DSCN MKR DOCD: CPT | Performed by: FAMILY MEDICINE

## 2024-09-11 PROCEDURE — G0008 ADMIN INFLUENZA VIRUS VAC: HCPCS | Performed by: FAMILY MEDICINE

## 2024-09-11 PROCEDURE — 90653 IIV ADJUVANT VACCINE IM: CPT | Performed by: FAMILY MEDICINE

## 2024-09-11 SDOH — ECONOMIC STABILITY: FOOD INSECURITY: WITHIN THE PAST 12 MONTHS, THE FOOD YOU BOUGHT JUST DIDN'T LAST AND YOU DIDN'T HAVE MONEY TO GET MORE.: NEVER TRUE

## 2024-09-11 SDOH — ECONOMIC STABILITY: INCOME INSECURITY: HOW HARD IS IT FOR YOU TO PAY FOR THE VERY BASICS LIKE FOOD, HOUSING, MEDICAL CARE, AND HEATING?: NOT VERY HARD

## 2024-09-11 SDOH — ECONOMIC STABILITY: FOOD INSECURITY: WITHIN THE PAST 12 MONTHS, YOU WORRIED THAT YOUR FOOD WOULD RUN OUT BEFORE YOU GOT MONEY TO BUY MORE.: NEVER TRUE

## 2024-09-11 ASSESSMENT — PATIENT HEALTH QUESTIONNAIRE - PHQ9
2. FEELING DOWN, DEPRESSED OR HOPELESS: NOT AT ALL
SUM OF ALL RESPONSES TO PHQ QUESTIONS 1-9: 0
1. LITTLE INTEREST OR PLEASURE IN DOING THINGS: NOT AT ALL
SUM OF ALL RESPONSES TO PHQ9 QUESTIONS 1 & 2: 0
SUM OF ALL RESPONSES TO PHQ QUESTIONS 1-9: 0

## 2024-12-06 DIAGNOSIS — E03.9 HYPOTHYROIDISM, ACQUIRED: ICD-10-CM

## 2024-12-09 RX ORDER — LEVOTHYROXINE SODIUM 75 UG/1
75 TABLET ORAL
Qty: 90 TABLET | Refills: 1 | Status: SHIPPED | OUTPATIENT
Start: 2024-12-09

## 2024-12-09 NOTE — TELEPHONE ENCOUNTER
[facility] send 2nd med refill request for:  Levothyroxine (SYNTHROID) 75 MCG tablet [[id number]]     Please assist. Thank you.

## 2025-01-10 ENCOUNTER — HOSPITAL ENCOUNTER (OUTPATIENT)
Facility: HOSPITAL | Age: 80
Setting detail: SPECIMEN
Discharge: HOME | End: 2025-01-13
Payer: MEDICARE

## 2025-01-10 ENCOUNTER — OFFICE VISIT (OUTPATIENT)
Facility: CLINIC | Age: 80
End: 2025-01-10

## 2025-01-10 VITALS
TEMPERATURE: 98.3 F | OXYGEN SATURATION: 89 % | WEIGHT: 171.8 LBS | SYSTOLIC BLOOD PRESSURE: 125 MMHG | BODY MASS INDEX: 26.04 KG/M2 | DIASTOLIC BLOOD PRESSURE: 78 MMHG | HEART RATE: 72 BPM | RESPIRATION RATE: 16 BRPM | HEIGHT: 68 IN

## 2025-01-10 DIAGNOSIS — E78.5 HYPERLIPIDEMIA WITH TARGET LDL LESS THAN 70: ICD-10-CM

## 2025-01-10 DIAGNOSIS — I26.94 MULTIPLE SUBSEGMENTAL PULMONARY EMBOLI WITHOUT ACUTE COR PULMONALE (HCC): ICD-10-CM

## 2025-01-10 DIAGNOSIS — N18.31 CHRONIC KIDNEY DISEASE, STAGE 3A (HCC): ICD-10-CM

## 2025-01-10 DIAGNOSIS — J43.1 PANLOBULAR EMPHYSEMA (HCC): ICD-10-CM

## 2025-01-10 DIAGNOSIS — E03.9 ACQUIRED HYPOTHYROIDISM: ICD-10-CM

## 2025-01-10 DIAGNOSIS — Z09 HOSPITAL DISCHARGE FOLLOW-UP: Primary | ICD-10-CM

## 2025-01-10 LAB
CHOLEST SERPL-MCNC: 259 MG/DL
HDLC SERPL-MCNC: 49 MG/DL (ref 40–60)
HDLC SERPL: 5.3 (ref 0–5)
LDLC SERPL CALC-MCNC: 177 MG/DL (ref 0–100)
LIPID PANEL: ABNORMAL
TRIGL SERPL-MCNC: 165 MG/DL
TSH SERPL DL<=0.05 MIU/L-ACNC: 6.2 UIU/ML (ref 0.36–3.74)
VLDLC SERPL CALC-MCNC: 33 MG/DL

## 2025-01-10 PROCEDURE — 36415 COLL VENOUS BLD VENIPUNCTURE: CPT

## 2025-01-10 PROCEDURE — 80061 LIPID PANEL: CPT

## 2025-01-10 PROCEDURE — 84443 ASSAY THYROID STIM HORMONE: CPT

## 2025-01-10 RX ORDER — GUAIFENESIN 600 MG/1
600 TABLET, EXTENDED RELEASE ORAL EVERY 12 HOURS
COMMUNITY
Start: 2024-12-30

## 2025-01-10 RX ORDER — PREDNISONE 10 MG/1
TABLET ORAL
COMMUNITY
Start: 2024-12-30

## 2025-01-10 SDOH — ECONOMIC STABILITY: FOOD INSECURITY: WITHIN THE PAST 12 MONTHS, THE FOOD YOU BOUGHT JUST DIDN'T LAST AND YOU DIDN'T HAVE MONEY TO GET MORE.: NEVER TRUE

## 2025-01-10 SDOH — ECONOMIC STABILITY: FOOD INSECURITY: WITHIN THE PAST 12 MONTHS, YOU WORRIED THAT YOUR FOOD WOULD RUN OUT BEFORE YOU GOT MONEY TO BUY MORE.: NEVER TRUE

## 2025-01-10 ASSESSMENT — PATIENT HEALTH QUESTIONNAIRE - PHQ9
SUM OF ALL RESPONSES TO PHQ QUESTIONS 1-9: 0
SUM OF ALL RESPONSES TO PHQ9 QUESTIONS 1 & 2: 0
2. FEELING DOWN, DEPRESSED OR HOPELESS: NOT AT ALL
1. LITTLE INTEREST OR PLEASURE IN DOING THINGS: NOT AT ALL
SUM OF ALL RESPONSES TO PHQ QUESTIONS 1-9: 0

## 2025-01-10 NOTE — PROGRESS NOTES
Deena Valdivia is a 79 y.o. year old female who presents today for   Chief Complaint   Patient presents with    Follow-Up from Hospital        \"Have you been to the ER, urgent care clinic since your last visit?  Hospitalized since your last visit?\"   YES - When: approximately 1 months ago.  Where and Why: [facility].     “Have you seen or consulted any other health care providers outside our system since your last visit?”   YES - When: approximately 7 days ago.  Where and Why: [facility].             Luzmaria Finch [facility]  Phone: [phone]  Fax: [phone]

## 2025-01-10 NOTE — PROGRESS NOTES
Deena Valdivia is a 79 y.o.  female and presents with    Chief Complaint   Patient presents with    Follow-Up from Hospital   12/13/2024 admitted for pulmonary embolism  12/30/2024 discharged to home    Subjective:  Ms. Valdivia is here for follow up after hospitalization released 8 days ago; she was treated for pneumonia and pulmonary embolism and DVT.  She is on home oxygen.  She has concentrator at home.      Without oxygen she has o2 sat 77%    She has pulmonologist, Claire Hamitlon with appointment in 10 days    She has follow up with vascular surgeon in 2 months.      Thyroid Review:   Patient is seen for followup of hypothyroidism.    Thyroid ROS: denies fatigue, weight changes, heat/cold intolerance, bowel/skin changes or CVS symptoms.     ROS    General ROS: negative for - chills, fatigue or fever   Psychological ROS: negative for - anxiety or depression   Ophthalmic ROS: negative for - blurry vision   ENT ROS: negative for - nasal congestion or sore throat   Endocrine ROS: negative for - polydipsia/polyuria or temperature intolerance   Respiratory ROS: diagnosed with copd and now with pulmonary emboli   Cardiovascular ROS: no chest pain; improved dyspnea on exertion   Gastrointestinal ROS: no abdominal pain, change in bowel habits, or black or bloody stools   Genito-Urinary ROS: no dysuria, trouble voiding, or hematuria   Musculoskeletal ROS: negative for - joint pain or muscle pain   Neurological ROS: negative for - numbness/tingling or weakness   Dermatological ROS: negative for - rash or skin lesion changes    All other systems reviewed and are negative.      Objective:    /78 (Site: Right Upper Arm, Position: Sitting, Cuff Size: Medium Adult)   Pulse 72   Temp 98.3 °F (36.8 °C) (Temporal)   Resp 16   Ht 1.727 m (5' 8\")   Wt 77.9 kg (171 lb 12.8 oz)   SpO2 (!) 89%   BMI 26.12 kg/m²     General Appearance:  Alert, cooperative, no distress, appears stated age   Head:

## 2025-01-18 RX ORDER — LEVOTHYROXINE SODIUM 88 UG/1
88 TABLET ORAL
Qty: 90 TABLET | Refills: 1 | Status: SHIPPED | OUTPATIENT
Start: 2025-01-18

## 2025-01-18 RX ORDER — ATORVASTATIN CALCIUM 40 MG/1
40 TABLET, FILM COATED ORAL DAILY
Qty: 90 TABLET | Refills: 0 | Status: SHIPPED | OUTPATIENT
Start: 2025-01-18

## 2025-02-25 DIAGNOSIS — J41.0 SIMPLE CHRONIC BRONCHITIS (HCC): ICD-10-CM

## 2025-02-25 DIAGNOSIS — J43.1 PANLOBULAR EMPHYSEMA (HCC): ICD-10-CM

## 2025-02-25 NOTE — TELEPHONE ENCOUNTER
Medication requested :   Requested Prescriptions     Pending Prescriptions Disp Refills    albuterol sulfate HFA (PROVENTIL;VENTOLIN;PROAIR) 108 (90 Base) MCG/ACT inhaler [Pharmacy Med Name: ALBUTEROL HFA INH (200 PUFFS) 8.5GM] 51 g      Sig: INHALE 2 PUFFS BY MOUTH AND INTO THE LUNGS EVERY 6 HOURS IF NEEDED FOR WHEEZING      PCP: Jae Espino MD  LOV:           1/10/2025   NOV DMA: 3/7/2025  FUTURE APPT:   Future Appointments   Date Time Provider Department Center   3/7/2025 10:00 AM Jae Espino MD [facility] DEP       Thank you.

## 2025-02-26 RX ORDER — BUDESONIDE, GLYCOPYRROLATE, AND FORMOTEROL FUMARATE 160; 9; 4.8 UG/1; UG/1; UG/1
2 AEROSOL, METERED RESPIRATORY (INHALATION) 2 TIMES DAILY
Qty: 32.1 G | Refills: 3 | Status: SHIPPED | OUTPATIENT
Start: 2025-02-26

## 2025-02-26 RX ORDER — ALBUTEROL SULFATE 90 UG/1
INHALANT RESPIRATORY (INHALATION)
Qty: 51 G | Refills: 1 | Status: SHIPPED | OUTPATIENT
Start: 2025-02-26

## 2025-02-26 RX ORDER — LEVOTHYROXINE SODIUM 75 UG/1
75 TABLET ORAL
Qty: 90 TABLET | OUTPATIENT
Start: 2025-02-26

## 2025-03-09 SDOH — HEALTH STABILITY: PHYSICAL HEALTH: ON AVERAGE, HOW MANY DAYS PER WEEK DO YOU ENGAGE IN MODERATE TO STRENUOUS EXERCISE (LIKE A BRISK WALK)?: 3 DAYS

## 2025-03-09 SDOH — HEALTH STABILITY: PHYSICAL HEALTH: ON AVERAGE, HOW MANY MINUTES DO YOU ENGAGE IN EXERCISE AT THIS LEVEL?: 30 MIN

## 2025-03-09 ASSESSMENT — PATIENT HEALTH QUESTIONNAIRE - PHQ9
SUM OF ALL RESPONSES TO PHQ QUESTIONS 1-9: 0
1. LITTLE INTEREST OR PLEASURE IN DOING THINGS: NOT AT ALL
2. FEELING DOWN, DEPRESSED OR HOPELESS: NOT AT ALL
SUM OF ALL RESPONSES TO PHQ QUESTIONS 1-9: 0

## 2025-03-09 ASSESSMENT — LIFESTYLE VARIABLES
HOW MANY STANDARD DRINKS CONTAINING ALCOHOL DO YOU HAVE ON A TYPICAL DAY: 1 OR 2
HOW OFTEN DO YOU HAVE A DRINK CONTAINING ALCOHOL: 2-4 TIMES A MONTH
HOW MANY STANDARD DRINKS CONTAINING ALCOHOL DO YOU HAVE ON A TYPICAL DAY: 1
HOW OFTEN DO YOU HAVE SIX OR MORE DRINKS ON ONE OCCASION: 1
HOW OFTEN DO YOU HAVE A DRINK CONTAINING ALCOHOL: 3

## 2025-03-11 ENCOUNTER — HOSPITAL ENCOUNTER (OUTPATIENT)
Facility: HOSPITAL | Age: 80
Setting detail: SPECIMEN
Discharge: HOME | End: 2025-03-14
Payer: MEDICARE

## 2025-03-11 ENCOUNTER — OFFICE VISIT (OUTPATIENT)
Facility: CLINIC | Age: 80
End: 2025-03-11

## 2025-03-11 VITALS
SYSTOLIC BLOOD PRESSURE: 122 MMHG | BODY MASS INDEX: 24.71 KG/M2 | RESPIRATION RATE: 18 BRPM | TEMPERATURE: 98.3 F | HEART RATE: 72 BPM | HEIGHT: 68 IN | WEIGHT: 163 LBS | OXYGEN SATURATION: 90 % | DIASTOLIC BLOOD PRESSURE: 66 MMHG

## 2025-03-11 DIAGNOSIS — Z71.89 ADVANCE CARE PLANNING: ICD-10-CM

## 2025-03-11 DIAGNOSIS — J41.0 SIMPLE CHRONIC BRONCHITIS (HCC): ICD-10-CM

## 2025-03-11 DIAGNOSIS — E03.9 ACQUIRED HYPOTHYROIDISM: ICD-10-CM

## 2025-03-11 DIAGNOSIS — Z00.00 MEDICARE ANNUAL WELLNESS VISIT, SUBSEQUENT: Primary | ICD-10-CM

## 2025-03-11 DIAGNOSIS — J43.1 PANLOBULAR EMPHYSEMA (HCC): ICD-10-CM

## 2025-03-11 DIAGNOSIS — R10.13 ABDOMINAL PAIN, EPIGASTRIC: ICD-10-CM

## 2025-03-11 DIAGNOSIS — E03.9 HYPOTHYROIDISM, ACQUIRED: ICD-10-CM

## 2025-03-11 DIAGNOSIS — Z99.81 SUPPLEMENTAL OXYGEN DEPENDENT: ICD-10-CM

## 2025-03-11 DIAGNOSIS — Z71.89 ADVANCE DIRECTIVE DISCUSSED WITH PATIENT: ICD-10-CM

## 2025-03-11 LAB — TSH SERPL DL<=0.05 MIU/L-ACNC: 1.13 UIU/ML (ref 0.36–3.74)

## 2025-03-11 PROCEDURE — 36415 COLL VENOUS BLD VENIPUNCTURE: CPT

## 2025-03-11 PROCEDURE — 84443 ASSAY THYROID STIM HORMONE: CPT

## 2025-03-11 RX ORDER — CALCIUM CARBONATE/VITAMIN D3 600 MG-10
1 TABLET ORAL DAILY
Qty: 200 TABLET | Refills: 4 | Status: SHIPPED | OUTPATIENT
Start: 2025-03-11

## 2025-03-11 RX ORDER — GUAIFENESIN 600 MG/1
600 TABLET, EXTENDED RELEASE ORAL EVERY 12 HOURS
Qty: 60 TABLET | Refills: 5 | Status: SHIPPED | OUTPATIENT
Start: 2025-03-11

## 2025-03-11 NOTE — PROGRESS NOTES
Deena Valdivia is a 79 y.o.  female and presents for follow up visit.     Chief Complaint   Patient presents with    Medicare AWV    Follow-Up from Hospital       Subjective:  Ms. Valdivia is a 79 year old female with a history of emphysema, pulmonary embolism, hypothyroidism, hyperlipidemia, and stage 3a CKD presenting for Medicare Wellness Visit.     Emphysema:  Patient has been following with pulmonology, and she will see them again in a month. She is currently on 3L nasal cannula oxygen and states she has been feeling stronger. She was provided with a referral to pulmonary rehabilitation, but she states she has already done pulmonary rehab in the past and knows what exercises to do.     Pulmonary embolism:  Patient is taking apixaban.     Epigastric pain:  Patient was seen in the ED on 3/02/25 for epigastric pain and regurgitation after eating and drinking. A nasopharyngeal scope showed no foreign body. She was given a referral for upper endoscopy.     Hyperlipidemia:  Patient is taking atorvastatin 40 mg. She has not noticed any adverse effects.    Hypothyroidism:  Patient is taking 88 mcg levothyroxine. She denies weight gain, fatigue, cold intolerance, or any other symptoms of hypothyroidism.            ROS   General ROS: positive for nasal congestion.   negative for - fatigue or weight gain    All other systems reviewed and are negative.      Objective:  Vitals:    03/11/25 1039   BP: 122/66   Pulse: 72   Resp: 18   Temp: 98.3 °F (36.8 °C)   SpO2: 90%         alert, well appearing, and in no distress, oriented to person, place, and time, normal appearing weight, and acyanotic, in no respiratory distress  Ears - right ear normal, left ear normal  Heart - normal rate and regular rhythm  Lungs: clear to auscultation bilaterally and diminished breath sounds bilaterally  Neuro - CN II-XII intact    LABS   Lipid panel  TSH          Assessment/Plan:    1. Medicare annual wellness visit, subsequent  - 
Deena Valdivia is a 79 y.o. year old female who presents today for   Chief Complaint   Patient presents with    Medicare AWV    Follow-Up from Hospital        \"Have you been to the ER, urgent care clinic since your last visit?  Hospitalized since your last visit?\"   YES - When: approximately 9 days ago.  Where and Why: [facility]/ DYSPHAGIA FOREIGN BODY IN THROAT.     “Have you seen or consulted any other health care providers outside our system since your last visit?”   YES - When: approximately 1 days ago.  Where and Why: [facility]/ CHRONIC RESPIRATORY FAILURE W/HYPOXIA.             [facility]  Phone: [phone]  Fax: [phone]  
see orders and patient instructions/AVS.  Recommended screening schedule for the next 5-10 years is provided to the patient in written form: see Patient Instructions/AVS.     Reviewed and updated this visit:  Allergies  Meds  Sexual Hx

## 2025-03-11 NOTE — PATIENT INSTRUCTIONS
recommends aspirin, take the amount directed each day. Make sure you take aspirin and not another kind of pain reliever, such as acetaminophen (Tylenol).   When should you call for help?   Call 911 if you have symptoms of a heart attack. These may include:    Chest pain or pressure, or a strange feeling in the chest.     Sweating.     Shortness of breath.     Pain, pressure, or a strange feeling in the back, neck, jaw, or upper belly or in one or both shoulders or arms.     Lightheadedness or sudden weakness.     A fast or irregular heartbeat.   After you call 911, the  may tell you to chew 1 adult-strength or 2 to 4 low-dose aspirin. Wait for an ambulance. Do not try to drive yourself.  Watch closely for changes in your health, and be sure to contact your doctor if you have any problems.  Where can you learn more?  Go to https://www.[employer]/patientEd and enter F075 to learn more about \"A Healthy Heart: Care Instructions.\"  Current as of: July 31, 2024  Content Version: 14.3  © 2024 [employer].   Care instructions adapted under license by [employer]. If you have questions about a medical condition or this instruction, always ask your healthcare professional. [employer] disclaims any warranty or liability for your use of this information.    Personalized Preventive Plan for Deena Valdivia - 3/11/2025  Medicare offers a range of preventive health benefits. Some of the tests and screenings are paid in full while other may be subject to a deductible, co-insurance, and/or copay.  Some of these benefits include a comprehensive review of your medical history including lifestyle, illnesses that may run in your family, and various assessments and screenings as appropriate.  After reviewing your medical record and screening and assessments performed today your provider may have ordered immunizations, labs, imaging, and/or referrals for you.  A list of these orders (if

## 2025-03-21 ENCOUNTER — RESULTS FOLLOW-UP (OUTPATIENT)
Facility: HOSPITAL | Age: 80
End: 2025-03-21

## 2025-04-11 DIAGNOSIS — E78.5 HYPERLIPIDEMIA WITH TARGET LDL LESS THAN 70: ICD-10-CM

## 2025-04-12 RX ORDER — ATORVASTATIN CALCIUM 40 MG/1
40 TABLET, FILM COATED ORAL DAILY
Qty: 90 TABLET | Refills: 3 | Status: SHIPPED | OUTPATIENT
Start: 2025-04-12

## 2025-04-14 DIAGNOSIS — E03.9 ACQUIRED HYPOTHYROIDISM: ICD-10-CM

## 2025-04-15 RX ORDER — LEVOTHYROXINE SODIUM 88 UG/1
88 TABLET ORAL
Qty: 90 TABLET | Refills: 1 | Status: SHIPPED | OUTPATIENT
Start: 2025-04-15

## 2025-06-26 DIAGNOSIS — T88.7XXA MEDICATION SIDE EFFECT: Primary | ICD-10-CM

## 2025-07-10 DIAGNOSIS — E03.9 ACQUIRED HYPOTHYROIDISM: ICD-10-CM

## 2025-07-11 NOTE — TELEPHONE ENCOUNTER
Medication(s) requesting:   Requested Prescriptions     Pending Prescriptions Disp Refills    levothyroxine (SYNTHROID) 88 MCG tablet 90 tablet 1     Sig: Take 1 tablet by mouth every morning (before breakfast)        Last Appointment:  3/11/2025    Future Appointments   Date Time Provider Department Center   9/11/2025  9:30 AM Jae Espino MD [facility] DEP

## 2025-07-12 RX ORDER — LEVOTHYROXINE SODIUM 88 UG/1
88 TABLET ORAL
Qty: 90 TABLET | Refills: 1 | Status: SHIPPED | OUTPATIENT
Start: 2025-07-12

## 2025-09-02 ENCOUNTER — TELEPHONE (OUTPATIENT)
Dept: PHARMACY | Facility: CLINIC | Age: 80
End: 2025-09-02